# Patient Record
Sex: FEMALE | Race: BLACK OR AFRICAN AMERICAN | Employment: OTHER | URBAN - METROPOLITAN AREA
[De-identification: names, ages, dates, MRNs, and addresses within clinical notes are randomized per-mention and may not be internally consistent; named-entity substitution may affect disease eponyms.]

---

## 2020-08-12 ENCOUNTER — OFFICE VISIT (OUTPATIENT)
Dept: INTERNAL MEDICINE CLINIC | Age: 65
End: 2020-08-12
Payer: MEDICARE

## 2020-08-12 VITALS
HEART RATE: 105 BPM | TEMPERATURE: 98 F | RESPIRATION RATE: 14 BRPM | HEIGHT: 63 IN | WEIGHT: 141 LBS | SYSTOLIC BLOOD PRESSURE: 130 MMHG | BODY MASS INDEX: 24.98 KG/M2 | DIASTOLIC BLOOD PRESSURE: 68 MMHG | OXYGEN SATURATION: 98 %

## 2020-08-12 DIAGNOSIS — Z12.11 COLON CANCER SCREENING: ICD-10-CM

## 2020-08-12 DIAGNOSIS — E11.9 TYPE 2 DIABETES MELLITUS WITHOUT COMPLICATION, WITHOUT LONG-TERM CURRENT USE OF INSULIN (HCC): ICD-10-CM

## 2020-08-12 DIAGNOSIS — R53.82 CHRONIC FATIGUE: ICD-10-CM

## 2020-08-12 DIAGNOSIS — Z12.31 BREAST CANCER SCREENING BY MAMMOGRAM: ICD-10-CM

## 2020-08-12 DIAGNOSIS — Z00.00 ENCOUNTER FOR ANNUAL PHYSICAL EXAM: ICD-10-CM

## 2020-08-12 DIAGNOSIS — Z78.0 POST-MENOPAUSAL: ICD-10-CM

## 2020-08-12 DIAGNOSIS — R00.0 SINUS TACHYCARDIA: Primary | ICD-10-CM

## 2020-08-12 DIAGNOSIS — Z86.010 HISTORY OF COLON POLYPS: ICD-10-CM

## 2020-08-12 DIAGNOSIS — E55.9 VITAMIN D DEFICIENCY: ICD-10-CM

## 2020-08-12 DIAGNOSIS — N39.0 URINARY TRACT INFECTION WITHOUT HEMATURIA, SITE UNSPECIFIED: ICD-10-CM

## 2020-08-12 LAB
25(OH)D3 SERPL-MCNC: 19 NG/ML (ref 30–96)
A-G RATIO,AGRAT: 1.2 RATIO
ALBUMIN SERPL-MCNC: 4.3 G/DL (ref 3.9–5.4)
ALP SERPL-CCNC: 157 U/L (ref 38–126)
ALT SERPL-CCNC: 22 U/L (ref 0–35)
ANION GAP SERPL CALC-SCNC: 11 MMOL/L
AST SERPL W P-5'-P-CCNC: 24 U/L (ref 14–36)
BACTERIA,BACTU: ABNORMAL
BILIRUB SERPL-MCNC: 0.6 MG/DL (ref 0.2–1.3)
BILIRUB UR QL: NEGATIVE
BUN SERPL-MCNC: 10 MG/DL (ref 7–17)
BUN/CREATININE RATIO,BUCR: 20 RATIO
CALCIUM SERPL-MCNC: 10.3 MG/DL (ref 8.4–10.2)
CHLORIDE SERPL-SCNC: 98 MMOL/L (ref 98–107)
CHOL/HDL RATIO,CHHD: 5 RATIO (ref 0–4)
CHOLEST SERPL-MCNC: 205 MG/DL (ref 0–200)
CLARITY: ABNORMAL
CO2 SERPL-SCNC: 30 MMOL/L (ref 22–32)
COLOR UR: ABNORMAL
CREAT SERPL-MCNC: 0.5 MG/DL (ref 0.7–1.2)
ERYTHROCYTE [DISTWIDTH] IN BLOOD BY AUTOMATED COUNT: 14.2 %
GLOBULIN,GLOB: 3.6
GLUCOSE 24H UR-MRATE: ABNORMAL G/(24.H)
GLUCOSE SERPL-MCNC: 392 MG/DL (ref 65–105)
HBA1C MFR BLD HPLC: 11.1 % (ref 4–5.7)
HCT VFR BLD AUTO: 37.5 % (ref 37–51)
HDLC SERPL-MCNC: 44 MG/DL (ref 35–130)
HGB BLD-MCNC: 12.3 G/DL (ref 12–18)
HGB UR QL STRIP: ABNORMAL
KETONES UR QL STRIP.AUTO: NEGATIVE
LDL/HDL RATIO,LDHD: 2 RATIO
LDLC SERPL CALC-MCNC: 83 MG/DL (ref 0–130)
LEUKOCYTE ESTERASE: ABNORMAL
Lab: ABNORMAL
MCH RBC QN AUTO: 27.8 PG (ref 26–32)
MCHC RBC AUTO-ENTMCNC: 32.8 G/DL (ref 30–36)
MCV RBC AUTO: 84.9 FL (ref 80–97)
MICROALBUMIN, URINE: 100 MG/L (ref 0–20)
NITRITE UR QL STRIP.AUTO: NEGATIVE
PH UR STRIP: 6.5 [PH] (ref 5–7)
PLATELET # BLD AUTO: 241 K/UL (ref 140–440)
POTASSIUM SERPL-SCNC: 4 MMOL/L (ref 3.6–5)
PROT SERPL-MCNC: 7.9 G/DL (ref 6.3–8.2)
PROT UR STRIP-MCNC: ABNORMAL MG/DL
RBC # BLD AUTO: 4.42 M/UL (ref 4.2–6.3)
RBC #/AREA URNS HPF: ABNORMAL #/HPF
SODIUM SERPL-SCNC: 139 MMOL/L (ref 137–145)
SP GR UR REFRACTOMETRY: 1.01 (ref 1–1.03)
TRIGL SERPL-MCNC: 389 MG/DL (ref 0–200)
TSH SERPL DL<=0.05 MIU/L-ACNC: 2.71 UIU/ML (ref 0.34–5.6)
UROBILINOGEN UR QL STRIP.AUTO: NEGATIVE
VLDLC SERPL CALC-MCNC: 78 MG/DL
WBC # BLD AUTO: 5.7 K/UL (ref 4.1–10.9)
WBC URNS QL MICRO: ABNORMAL #/HPF

## 2020-08-12 PROCEDURE — 83036 HEMOGLOBIN GLYCOSYLATED A1C: CPT | Performed by: INTERNAL MEDICINE

## 2020-08-12 PROCEDURE — G9899 SCRN MAM PERF RSLTS DOC: HCPCS | Performed by: INTERNAL MEDICINE

## 2020-08-12 PROCEDURE — G8427 DOCREV CUR MEDS BY ELIG CLIN: HCPCS | Performed by: INTERNAL MEDICINE

## 2020-08-12 PROCEDURE — 3046F HEMOGLOBIN A1C LEVEL >9.0%: CPT | Performed by: INTERNAL MEDICINE

## 2020-08-12 PROCEDURE — G0403 EKG FOR INITIAL PREVENT EXAM: HCPCS | Performed by: INTERNAL MEDICINE

## 2020-08-12 PROCEDURE — 84443 ASSAY THYROID STIM HORMONE: CPT | Performed by: INTERNAL MEDICINE

## 2020-08-12 PROCEDURE — 85027 COMPLETE CBC AUTOMATED: CPT | Performed by: INTERNAL MEDICINE

## 2020-08-12 PROCEDURE — 1101F PT FALLS ASSESS-DOCD LE1/YR: CPT | Performed by: INTERNAL MEDICINE

## 2020-08-12 PROCEDURE — 81001 URINALYSIS AUTO W/SCOPE: CPT | Performed by: INTERNAL MEDICINE

## 2020-08-12 PROCEDURE — 82306 VITAMIN D 25 HYDROXY: CPT | Performed by: INTERNAL MEDICINE

## 2020-08-12 PROCEDURE — 3017F COLORECTAL CA SCREEN DOC REV: CPT | Performed by: INTERNAL MEDICINE

## 2020-08-12 PROCEDURE — 1090F PRES/ABSN URINE INCON ASSESS: CPT | Performed by: INTERNAL MEDICINE

## 2020-08-12 PROCEDURE — G8400 PT W/DXA NO RESULTS DOC: HCPCS | Performed by: INTERNAL MEDICINE

## 2020-08-12 PROCEDURE — 80061 LIPID PANEL: CPT | Performed by: INTERNAL MEDICINE

## 2020-08-12 PROCEDURE — G0402 INITIAL PREVENTIVE EXAM: HCPCS | Performed by: INTERNAL MEDICINE

## 2020-08-12 PROCEDURE — G8536 NO DOC ELDER MAL SCRN: HCPCS | Performed by: INTERNAL MEDICINE

## 2020-08-12 PROCEDURE — G8420 CALC BMI NORM PARAMETERS: HCPCS | Performed by: INTERNAL MEDICINE

## 2020-08-12 PROCEDURE — G8510 SCR DEP NEG, NO PLAN REQD: HCPCS | Performed by: INTERNAL MEDICINE

## 2020-08-12 PROCEDURE — 80053 COMPREHEN METABOLIC PANEL: CPT | Performed by: INTERNAL MEDICINE

## 2020-08-12 PROCEDURE — 99204 OFFICE O/P NEW MOD 45 MIN: CPT | Performed by: INTERNAL MEDICINE

## 2020-08-12 PROCEDURE — 82044 UR ALBUMIN SEMIQUANTITATIVE: CPT | Performed by: INTERNAL MEDICINE

## 2020-08-12 PROCEDURE — 2022F DILAT RTA XM EVC RTNOPTHY: CPT | Performed by: INTERNAL MEDICINE

## 2020-08-12 RX ORDER — BISMUTH SUBSALICYLATE 262 MG
1 TABLET,CHEWABLE ORAL DAILY
COMMUNITY

## 2020-08-12 NOTE — PATIENT INSTRUCTIONS
The best way to stay healthy is to live a healthy lifestyle. A healthy lifestyle includes regular exercise, eating a well-balanced diet, keeping a healthy weight and not smoking. Regular physical exams and screening tests are another important way to take care of yourself. Preventive exams provided by health care providers can find health problems early when treatment works best and can keep you from getting certain diseases or illnesses. Preventive services include exams, lab tests, screenings, shots, monitoring and information to help you take care of your own health. All people over 65 should have a pneumonia shot. Pneumonia shots are usually only needed once in a lifetime unless your doctor decides differently. In addition to your physical exam, some screening tests are recommended: 
 
All people over 65 should have a yearly flu shot. People over 65 are at medium to high risk for Hepatitis B. Three shots are needed for complete protection. Bone mass measurement (dexa scan) is recommended every two years. Diabetes Mellitus screening is recommended every year. Glaucoma is an eye disease caused by high pressure in the eye. An eye exam is recommended every year. Cardiovascular screening tests that check your cholesterol and other blood fat (lipid) levels are recommended every five years. Colorectal Cancer screening tests help to find pre-cancerous polyps (growths in the colon) so they can be removed before they turn into cancer. Tests ordered for screening depend on your personal and family history risk factors. Prostate Cancer Screening (annually up to age 76) Screening for breast cancer is recommended yearly with a Mammogram. 
 
Screening for cervical and vaginal cancer is recommended with a pelvic and Pap test every two years.  However if you have had an abnormal pap in the past  three years or at high risk for cervical or vaginal cancer Medicare will cover a pap test and a pelvic exam every year. Here is a list of your current Health Maintenance items with a due date: 
Health Maintenance Due Topic Date Due  
 DTaP/Tdap/Td  (1 - Tdap) 08/04/1975  Cholesterol Test   08/04/1994  Shingles Vaccine (1 of 2) 08/04/2004  Mammogram  08/04/2004  Colon Cancer Stool Test  08/04/2004  Glaucoma Screening   08/04/2019  Pneumococcal Vaccine (1 of 1 - PPSV23) 08/04/2019  Flu Vaccine  08/01/2020

## 2020-08-12 NOTE — PROGRESS NOTES
Chief Complaint   Patient presents with    Annual Wellness Visit     welcome    Establish Care       Depression Risk Factor Screening:     3 most recent PHQ Screens 8/12/2020   Little interest or pleasure in doing things Not at all   Feeling down, depressed, irritable, or hopeless Not at all   Total Score PHQ 2 0       Functional Ability and Level of Safety:     Activities of Daily Living  ADL Assessment 8/12/2020   Feeding yourself No Help Needed   Getting from bed to chair No Help Needed   Getting dressed No Help Needed   Bathing or showering No Help Needed   Walk across the room (includes cane/walker) No Help Needed   Using the telphone No Help Needed   Taking your medications No Help Needed   Preparing meals No Help Needed   Managing money (expenses/bills) No Help Needed   Moderately strenuous housework (laundry) No Help Needed   Shopping for personal items (toiletries/medicines) No Help Needed   Shopping for groceries No Help Needed   Driving No Help Needed   Climbing a flight of stairs No Help Needed   Getting to places beyond walking distances No Help Needed       Fall Risk  Fall Risk Assessment, last 12 mths 8/12/2020   Able to walk? Yes   Fall in past 12 months? No       Abuse Screen  Abuse Screening Questionnaire 8/12/2020   Do you ever feel afraid of your partner? N   Are you in a relationship with someone who physically or mentally threatens you? N   Is it safe for you to go home?  Y         Patient Care Team   Patient Care Team:  Nay Wynn MD as PCP - General (Hospitalist)

## 2020-08-12 NOTE — PROGRESS NOTES
This note will not be viewable in [address] Prudence Hills is a 76 y.o. female and [facility] and Establish Care      Subjective:  Patient comes in today to establish care. She is a new patient to our practice. She is never had a PCP in the past.  She lives with her 3 daughters  formerly [facility]. 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 One of her daughter works in [employer]. The second daughter is mentally challenged and she takes care of her. She has a past medical history of type 2 diabetes and was on metformin in the past.  She reports she ran out of the medications and was not financially capable of setting up doctor visits since she was uninsured at that point. She does not check her blood sugars regularly. She does not have a glucose meter. She denies hypoglycemic episodes, polyuria, polydipsia, numbness or tingling sensation in upper or lower extremity. Sinus tachycardia on presentation. Which again predates her mild anxiety of coming to the doctor's office. Denies any racing of heart, chest pain or anginal symptoms. EKG was done in office which was personally reviewed by me and showed sinus tachycardia, heart rate 112. Past Medical History:   Diagnosis Date    Diabetes [facility])      Past Surgical History:   Procedure Laterality Date    HX GYN      removed overy on the left side     No Known Allergies  Current Outpatient Medications   Medication Sig Dispense Refill    multivitamin (ONE A DAY) tablet Take 1 Tab by mouth daily.        Social History     Socioeconomic History    Marital status: SINGLE     Spouse name: Not on file    Number of children: Not on file    Years of education: Not on file    Highest education level: Not on file   Tobacco Use    Smoking status: Never Smoker    Smokeless tobacco: Never Used   Substance and Sexual Activity    Alcohol use: Never     Frequency: Never    Drug use: Never    Sexual activity: Not Currently     Family History   Problem Relation Age of Onset   [facility] Alzheimer Mother     No Known Problems Father        Review of Systems  ROS is unremarkable except for ones highlighted in bold.    Constitutional: negative for fevers, chills, anorexia and weight loss  Eyes:   negative for visual disturbance and irritation  ENT:   negative for tinnitus,sore throat,nasal congestion,ear pain,hoarseness  Respiratory:  negative for cough, hemoptysis, dyspnea,wheezing  CV:   negative for chest pain, palpitations, lower extremity edema  GI:   negative for nausea, vomiting, diarrhea, abdominal pain,melena  Endo:               negative for polyuria,polydipsia,polyphagia,heat intolerance  Genitourinary: negative for frequency, dysuria and hematuria  Integumentary: negative for rash and pruritus  Hematologic:  negative for easy bruising and gum/nose bleeding  Musculoskel: negative for myalgias, arthralgias, back pain, muscle weakness, joint pain  Neurological:  negative for headaches, dizziness, vertigo, memory problems and gait   Behavl/Psych: negative for feelings of anxiety, depression, mood changes  ROS otherwise negative     Objective:  Visit Vitals  /68 (BP 1 Location: Left arm, BP Patient Position: Sitting)   Pulse (!) 105   Temp 98 °F (36.7 °C)   Resp 14   Ht 5' 3\" (1.6 m)   Wt 141 lb (64 kg)   SpO2 98%   BMI 24.98 kg/m²     Physical Exam:   General appearance - alert, well appearing, and in no distress  Mental status - alert, oriented to person, place, and time  EYE-SHAINA, EOMI, fundi normal, corneas normal, no foreign bodies  ENT-ENT exam normal, no neck nodes or sinus tenderness  Nose - normal and patent, no erythema, discharge or polyps  Mouth - mucous membranes moist, pharynx normal without lesions  Neck - supple, no significant adenopathy   Chest - clear to auscultation, no wheezes, rales or rhonchi, symmetric air entry   Heart - normal rate, regular rhythm, normal S1, S2, no murmurs, rubs, clicks or gallops   Abdomen - soft, nontender, nondistended, no masses or organomegaly  Lymph- no adenopathy palpable  Ext-peripheral pulses normal, no pedal edema, no clubbing or cyanosis  Skin-Warm and dry. no hyperpigmentation, vitiligo, or suspicious lesions  Neuro -alert, oriented, normal speech, no focal findings or movement disorder noted  Musculoskeletal- FROM, no bony abnormalities, no point tenderness    Lab Review:  No results found for this or any previous visit. Documenation Review:    Assessment/Plan:    Diagnoses and all orders for this visit:    1. Sinus tachycardia  -     AMB POC EKG ROUTINE W/ 12 LEADS, INTER & REP    2. Colon cancer screening  -     REFERRAL TO GENERAL SURGERY    3. History of colon polyps  -     REFERRAL TO GENERAL SURGERY    4. Encounter for annual physical exam    5. Type 2 diabetes mellitus without complication, without long-term current use of insulin (HCC)  -     CBC W/O DIFF  -     LIPID PANEL  -     METABOLIC PANEL, COMPREHENSIVE  -     HEMOGLOBIN A1C W/O EAG  -     URINE, MICROALBUMIN, SEMIQUANTITATIVE  -     URINALYSIS W/MICROSCOPIC  -      DIABETES FOOT EXAM    6. Vitamin D deficiency  -     VITAMIN D, 25 HYDROXY    7. Chronic fatigue  -     TSH 3RD GENERATION    8. Breast cancer screening by mammogram  -     PANCHO MAMMO BI SCREENING INCL CAD; Future    9. Post-menopausal  -     DEXA BONE DENSITY STUDY AXIAL; Future  -     REFERRAL TO OBSTETRICS AND GYNECOLOGY      I will call with lab results and make further recommendations or adjustments if necessary. Patient has a history of type 2 diabetes. Will review A1c and decide if she needs to be started on metformin again. Overdue for all her health screenings immunization. Referral for gynecologist placed to get a pelvic examination and Pap smears. Referral for colonoscopy for colon cancer screening please. She reports she had a colonoscopy done 5 years back.   She had a history of personal colon polyp s/p polypectomy in the past.  Her mammogram was 2 years back which was unremarkable per patient. She is overdue for 1. Patient has not had any immunizations. She was offered Pneumovax 23 today however she declined. She needs Pneumovax 23, Shingrix, DTaP, flu vaccine. Referral for ophthalmologist for eye examination. ICD-10-CM ICD-9-CM    1. Sinus tachycardia  R00.0 427.89 AMB POC EKG ROUTINE W/ 12 LEADS, INTER & REP   2. Colon cancer screening  Z12.11 V76.51 REFERRAL TO GENERAL SURGERY   3. History of colon polyps  Z86.010 V12.72 REFERRAL TO GENERAL SURGERY   4. Encounter for annual physical exam  Z00.00 V70.0    5. Type 2 diabetes mellitus without complication, without long-term current use of insulin (HCC)  E11.9 250.00 CBC W/O DIFF      LIPID PANEL      METABOLIC PANEL, COMPREHENSIVE      HEMOGLOBIN A1C W/O EAG      URINE, MICROALBUMIN, SEMIQUANTITATIVE      URINALYSIS W/MICROSCOPIC      HM DIABETES FOOT EXAM   6. Vitamin D deficiency  E55.9 268.9 VITAMIN D, 25 HYDROXY   7. Chronic fatigue  R53.82 780.79 TSH 3RD GENERATION   8. Breast cancer screening by mammogram  Z12.31 V76.12 PANCHO MAMMO BI SCREENING INCL CAD   9. Post-menopausal  Z78.0 V49.81 DEXA BONE DENSITY STUDY AXIAL      REFERRAL TO OBSTETRICS AND GYNECOLOGY         Follow-up and Dispositions    · Return in about 3 months (around 11/12/2020) for follow up. I have reviewed with the patient details of the assessment and plan and all questions were answered. Relevent patient education was performed. Verbal and/or written instructions (see AVS) provided. The most recent lab findings were reviewed with the patient. Plan was discussed with patient who verbally expressed understanding. An After Visit Summary was printed and given to the patient.     Yun Palm MD

## 2020-08-13 NOTE — PROGRESS NOTES
Patient is a newly diagnosed diabetic. Her A1c is high at 11. Her triglycerides are concerning as well. Please schedule follow-up appointment for patient next week. I would like to discuss labs with her and treatment options.

## 2020-08-16 LAB — BACTERIA UR CULT: ABNORMAL

## 2020-08-20 ENCOUNTER — DOCUMENTATION ONLY (OUTPATIENT)
Dept: INTERNAL MEDICINE CLINIC | Age: 65
End: 2020-08-20

## 2020-08-26 ENCOUNTER — OFFICE VISIT (OUTPATIENT)
Dept: INTERNAL MEDICINE CLINIC | Age: 65
End: 2020-08-26
Payer: MEDICARE

## 2020-08-26 VITALS
HEART RATE: 107 BPM | OXYGEN SATURATION: 98 % | RESPIRATION RATE: 14 BRPM | BODY MASS INDEX: 24.8 KG/M2 | HEIGHT: 63 IN | DIASTOLIC BLOOD PRESSURE: 64 MMHG | WEIGHT: 140 LBS | SYSTOLIC BLOOD PRESSURE: 160 MMHG | TEMPERATURE: 98.5 F

## 2020-08-26 DIAGNOSIS — I10 ESSENTIAL HYPERTENSION: ICD-10-CM

## 2020-08-26 DIAGNOSIS — E11.9 NEW ONSET TYPE 2 DIABETES MELLITUS (HCC): ICD-10-CM

## 2020-08-26 DIAGNOSIS — E78.2 MIXED HYPERLIPIDEMIA: ICD-10-CM

## 2020-08-26 DIAGNOSIS — E55.9 VITAMIN D DEFICIENCY: Primary | ICD-10-CM

## 2020-08-26 PROCEDURE — G9899 SCRN MAM PERF RSLTS DOC: HCPCS | Performed by: INTERNAL MEDICINE

## 2020-08-26 PROCEDURE — 1101F PT FALLS ASSESS-DOCD LE1/YR: CPT | Performed by: INTERNAL MEDICINE

## 2020-08-26 PROCEDURE — G8536 NO DOC ELDER MAL SCRN: HCPCS | Performed by: INTERNAL MEDICINE

## 2020-08-26 PROCEDURE — 2022F DILAT RTA XM EVC RTNOPTHY: CPT | Performed by: INTERNAL MEDICINE

## 2020-08-26 PROCEDURE — 3046F HEMOGLOBIN A1C LEVEL >9.0%: CPT | Performed by: INTERNAL MEDICINE

## 2020-08-26 PROCEDURE — 3017F COLORECTAL CA SCREEN DOC REV: CPT | Performed by: INTERNAL MEDICINE

## 2020-08-26 PROCEDURE — G8427 DOCREV CUR MEDS BY ELIG CLIN: HCPCS | Performed by: INTERNAL MEDICINE

## 2020-08-26 PROCEDURE — 99214 OFFICE O/P EST MOD 30 MIN: CPT | Performed by: INTERNAL MEDICINE

## 2020-08-26 PROCEDURE — 1090F PRES/ABSN URINE INCON ASSESS: CPT | Performed by: INTERNAL MEDICINE

## 2020-08-26 PROCEDURE — G8420 CALC BMI NORM PARAMETERS: HCPCS | Performed by: INTERNAL MEDICINE

## 2020-08-26 PROCEDURE — G8432 DEP SCR NOT DOC, RNG: HCPCS | Performed by: INTERNAL MEDICINE

## 2020-08-26 PROCEDURE — G8400 PT W/DXA NO RESULTS DOC: HCPCS | Performed by: INTERNAL MEDICINE

## 2020-08-26 RX ORDER — INSULIN PUMP SYRINGE, 3 ML
EACH MISCELLANEOUS
Qty: 1 KIT | Refills: 0 | Status: SHIPPED | OUTPATIENT
Start: 2020-08-26 | End: 2022-02-11

## 2020-08-26 RX ORDER — ERGOCALCIFEROL 1.25 MG/1
50000 CAPSULE ORAL
Qty: 12 CAP | Refills: 0 | Status: SHIPPED | OUTPATIENT
Start: 2020-08-26 | End: 2020-11-12

## 2020-08-26 RX ORDER — INSULIN GLARGINE 100 [IU]/ML
10 INJECTION, SOLUTION SUBCUTANEOUS
Qty: 1 ADJUSTABLE DOSE PRE-FILLED PEN SYRINGE | Refills: 1 | Status: SHIPPED | OUTPATIENT
Start: 2020-08-26 | End: 2020-11-18 | Stop reason: SDUPTHER

## 2020-08-26 RX ORDER — LISINOPRIL 5 MG/1
5 TABLET ORAL DAILY
Qty: 30 TAB | Refills: 0 | Status: SHIPPED | OUTPATIENT
Start: 2020-08-26 | End: 2020-10-02 | Stop reason: SDUPTHER

## 2020-08-26 RX ORDER — METFORMIN HYDROCHLORIDE 500 MG/1
500 TABLET ORAL 2 TIMES DAILY WITH MEALS
Qty: 60 TAB | Refills: 0 | Status: SHIPPED | OUTPATIENT
Start: 2020-08-26 | End: 2020-09-09 | Stop reason: SDUPTHER

## 2020-08-26 RX ORDER — IBUPROFEN 200 MG
CAPSULE ORAL
Qty: 100 STRIP | Refills: 0 | Status: SHIPPED | OUTPATIENT
Start: 2020-08-26 | End: 2020-10-27

## 2020-08-26 RX ORDER — ATORVASTATIN CALCIUM 20 MG/1
20 TABLET, FILM COATED ORAL DAILY
Qty: 90 TAB | Refills: 0 | Status: SHIPPED | OUTPATIENT
Start: 2020-08-26 | End: 2020-11-29

## 2020-08-26 RX ORDER — LANCETS
EACH MISCELLANEOUS
Qty: 100 EACH | Refills: 0 | Status: SHIPPED | OUTPATIENT
Start: 2020-08-26 | End: 2020-10-27

## 2020-08-26 RX ORDER — GLIPIZIDE 5 MG/1
5 TABLET ORAL 2 TIMES DAILY
Qty: 60 TAB | Refills: 0 | Status: SHIPPED | OUTPATIENT
Start: 2020-08-26 | End: 2020-09-09 | Stop reason: SDUPTHER

## 2020-08-26 RX ORDER — PEN NEEDLE, DIABETIC 30 GX3/16"
NEEDLE, DISPOSABLE MISCELLANEOUS
Qty: 1 PACKAGE | Refills: 11 | Status: SHIPPED | OUTPATIENT
Start: 2020-08-26

## 2020-08-26 NOTE — PROGRESS NOTES
This note will not be viewable in [address] Blair Gauthier is a 76 y.o. female and presents with Follow-up (1 week fu)      Subjective:  Patient comes in today for follow-up to discuss abnormal lab results. Patient is a newly diagnosed diabetic with microalbuminuria- Her A1c is high at 11. She is never been on any oral antidiabetic medications. She denies numbness or tingling sensation upper lower extremity. Denies shaking chills or hypoglycemic events. Denies polyuria, polydipsia. Dyslipidemia-her triglycerides are concerning as well. Not on any statin. Essential hypertension-not on any antihypertensives. Recap-Patient comes in today to establish care. She is a new patient to our practice. She is never had a PCP in the past.  She lives with her 3 daughters  [facility]. 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 One of her daughter works in [employer]. The second daughter is mentally challenged and she takes care of her. She has a past medical history of type 2 diabetes and was on metformin in the past.  She reports she ran out of the medications and was not financially capable of setting up doctor visits since she was uninsured at that point. She does not check her blood sugars regularly. She does not have a glucose meter. She denies hypoglycemic episodes, polyuria, polydipsia, numbness or tingling sensation in upper or lower extremity. Sinus tachycardia on presentation. Which again predates her mild anxiety of coming to the doctor's office. Denies any racing of heart, chest pain or anginal symptoms. EKG was done in office which was personally reviewed by me and showed sinus tachycardia, heart rate 112.     Past Medical History:   Diagnosis Date    Diabetes [facility])      Past Surgical History:   Procedure Laterality Date    HX GYN      removed overy on the left side     No Known Allergies  Current Outpatient Medications Medication Sig Dispense Refill    ergocalciferol (ERGOCALCIFEROL) 1,250 mcg (50,000 unit) capsule Take 1 Cap by mouth every seven (7) days for 12 doses. Indications: vitamin D deficiency (high dose therapy) 12 Cap 0    Blood-Glucose Meter monitoring kit As directed 1 Kit 0    glucose blood VI test strips (blood glucose test) strip Check BSL 3 times a day 100 Strip 0    lancets misc Test BG three times a day 100 Each 0    metFORMIN (GLUCOPHAGE) 500 mg tablet Take 1 Tab by mouth two (2) times daily (with meals) for 30 days. 60 Tab 0    atorvastatin (LIPITOR) 20 mg tablet Take 1 Tab by mouth daily for 90 days. 90 Tab 0    glipiZIDE (GLUCOTROL) 5 mg tablet Take 1 Tab by mouth two (2) times a day for 30 days. Indications: type 2 diabetes mellitus 60 Tab 0    lisinopriL (PRINIVIL, ZESTRIL) 5 mg tablet Take 1 Tab by mouth daily for 30 days. Indications: high blood pressure 30 Tab 0    insulin glargine (LANTUS,BASAGLAR) 100 unit/mL (3 mL) inpn 10 Units by SubCUTAneous route nightly. Indications: type 2 diabetes mellitus 1 Adjustable Dose Pre-filled Pen Syringe 1    Insulin Needles, Disposable, 31 gauge x 5/16\" ndle Daily use 1 Package 11    multivitamin (ONE A DAY) tablet Take 1 Tab by mouth daily. Social History     Socioeconomic History    Marital status: SINGLE     Spouse name: Not on file    Number of children: Not on file    Years of education: Not on file    Highest education level: Not on file   Tobacco Use    Smoking status: Never Smoker    Smokeless tobacco: Never Used   Substance and Sexual Activity    Alcohol use: Never     Frequency: Never    Drug use: Never    Sexual activity: Not Currently     Family History   Problem Relation Age of Onset    Alzheimer Mother     No Known Problems Father        Review of Systems  ROS is unremarkable except for ones highlighted in bold.    Constitutional: negative for fevers, chills, anorexia and weight loss  Eyes:   negative for visual disturbance and irritation  ENT:   negative for tinnitus,sore throat,nasal congestion,ear pain,hoarseness  Respiratory:  negative for cough, hemoptysis, dyspnea,wheezing  CV:   negative for chest pain, palpitations, lower extremity edema  GI:   negative for nausea, vomiting, diarrhea, abdominal pain,melena  Endo:               negative for polyuria,polydipsia,polyphagia,heat intolerance  Genitourinary: negative for frequency, dysuria and hematuria  Integumentary: negative for rash and pruritus  Hematologic:  negative for easy bruising and gum/nose bleeding  Musculoskel: negative for myalgias, arthralgias, back pain, muscle weakness, joint pain  Neurological:  negative for headaches, dizziness, vertigo, memory problems and gait   Behavl/Psych: negative for feelings of anxiety, depression, mood changes  ROS otherwise negative     Objective:  Visit Vitals  /64 (BP 1 Location: Left arm, BP Patient Position: Sitting)   Pulse (!) 107   Temp 98.5 °F (36.9 °C)   Resp 14   Ht 5' 3\" (1.6 m)   Wt 140 lb (63.5 kg)   SpO2 98%   BMI 24.80 kg/m²     Physical Exam:   General appearance - alert, well appearing, and in no distress  Mental status - alert, oriented to person, place, and time  EYE-SHAINA, EOMI, fundi normal, corneas normal, no foreign bodies  ENT-ENT exam normal, no neck nodes or sinus tenderness  Nose - normal and patent, no erythema, discharge or polyps  Mouth - mucous membranes moist, pharynx normal without lesions  Neck - supple, no significant adenopathy   Chest - clear to auscultation, no wheezes, rales or rhonchi, symmetric air entry   Heart - normal rate, regular rhythm, normal S1, S2, no murmurs, rubs, clicks or gallops   Abdomen - soft, nontender, nondistended, no masses or organomegaly  Lymph- no adenopathy palpable  Ext-peripheral pulses normal, no pedal edema, no clubbing or cyanosis  Skin-Warm and dry.  no hyperpigmentation, vitiligo, or suspicious lesions  Neuro -alert, oriented, normal speech, no focal findings or movement disorder noted  Musculoskeletal- FROM, no bony abnormalities, no point tenderness    Lab Review:  Results for orders placed or performed in visit on 08/12/20   CULTURE, URINE    Specimen: Urine   Result Value Ref Range    Urine Culture, Routine (A)      Escherichia coli  Greater than 100,000 colony forming units per mL         Susceptibility    Escherichia coli -  (no method available)*     Amoxicillin/Clavulanic A S Susceptible ug/mL     Ampicillin ($) R Resistant ug/mL     Cefepime ($$) S Susceptible ug/mL     Ceftriaxone ($) S Susceptible ug/mL     Cefuroxime ($) S Susceptible ug/mL     Ciprofloxacin ($) R Resistant ug/mL     Ertapenem ($$$$) S Susceptible ug/mL     Gentamicin ($) S Susceptible ug/mL     Imipenem S Susceptible ug/mL     Levofloxacin ($) R Resistant ug/mL     Meropenem ($$) S Susceptible ug/mL     Nitrofurantoin S Susceptible ug/mL     Piperacillin/Tazobac ($) S Susceptible ug/mL     Tetracycline S Susceptible ug/mL     Tobramycin ($) S Susceptible ug/mL     Trimeth-Sulfamethoxa R Resistant ug/mL     * Performed at:  [address] [place] [address]  [id number] Director: Urmila Abrams MD, Phone:  [phone]   CBC W/O DIFF   Result Value Ref Range    WBC 5.7 4.1 - 10.9 K/uL    RBC 4.42 4.20 - 6.30 M/uL    HGB 12.3 12.0 - 18.0 g/dL    HCT 37.5 37.0 - 51.0 %    MCH 27.8 26.0 - 32.0 pg    MCHC 32.8 30.0 - 36.0 g/dL    MCV 84.9 80.0 - 97.0 fL    RDW 14.2 %    PLATELET 775.3 881.9 - 440.0 K/uL   LIPID PANEL   Result Value Ref Range    Cholesterol, total 205 (H) 0 - 200 mg/dL    Triglyceride 389 (H) 0 - 200 mg/dL    HDL Cholesterol 44 35 - 130 mg/dL    VLDL 78 mg/dL    LDL, calculated 83 0 - 130 mg/dL    CHOL/HDL Ratio 5 (H) 0 - 4 Ratio    LDL/HDL Ratio 2 Ratio   METABOLIC PANEL, COMPREHENSIVE   Result Value Ref Range    Glucose 392 (H) 65 - 105 mg/dL    BUN 10.0 7.0 - 17.0 mg/dL    Creatinine 0.5 (L) 0.7 - 1.2 mg/dL    Sodium 139 137 - 145 mmol/L    Potassium 4.0 3.6 - 5.0 mmol/L    Chloride 98 98 - 107 mmol/L    CO2 30.0 22.0 - 32.0 mmol/L    Calcium 10.3 (H) 8.4 - 10.2 mg/dl    Protein, total 7.9 6.3 - 8.2 g/dL    Albumin 4.3 3.9 - 5.4 g/dL    ALT (SGPT) 22 0 - 35 U/L    AST (SGOT) 24.0 14.0 - 36.0 U/L    Alk. phosphatase 157 (H) 38 - 126 U/L    Bilirubin, total 0.6 0.2 - 1.3 mg/dL    BUN/Creatinine ratio 20 Ratio    GFR est AA >60 mL/min/1.73m2    GFR est non-AA >60 mL/min/1.73m2    Globulin 3.60     A-G Ratio 1.2 Ratio    Anion gap 11 mmol/L   HEMOGLOBIN A1C W/O EAG   Result Value Ref Range    Hemoglobin A1c 11.1 (H) 4.0 - 5.7 %   VITAMIN D, 25 HYDROXY   Result Value Ref Range    VITAMIN D, 25-HYDROXY 19 (L) 30 - 96 ng/mL   TSH 3RD GENERATION   Result Value Ref Range    TSH, 3rd generation 2.71 0.34 - 5.60 uIU/mL   URINE, MICROALBUMIN, SEMIQUANTITATIVE   Result Value Ref Range    Microalbumin, Urine 100 (A) 0 - 20 mg/L   URINALYSIS W/MICROSCOPIC   Result Value Ref Range    Color Pale Yellow Pale Yellow - Yellow    CLARITY very cloudy (A) Clear    Glucose urine, 24 hr 4+ (A) Negative    Ketone Negative Negative    Bilirubin Negative Negative    Specific gravity 1.015 1.000 - 1.030    pH (UA) 6.5 5 - 7    Blood 2+ (A) Negative    Protein 2+ (A) Negative    Urobilinogen Negative Negative    Nitrites Negative Negative    Leukocyte Esterase 3+ (A) Negative    WBC TNTC (A) 0 #/HPF    RBC 2-5 (A) 0 #/HPF    Bacteria Few (A) None Seen    Yeast w/hyphae Many (A) None Seen        Documenation Review:    Assessment/Plan:    Diagnoses and all orders for this visit:    1. Vitamin D deficiency  -     ergocalciferol (ERGOCALCIFEROL) 1,250 mcg (50,000 unit) capsule; Take 1 Cap by mouth every seven (7) days for 12 doses. Indications: vitamin D deficiency (high dose therapy)    2. New onset type 2 diabetes mellitus (HCC)  -     Blood-Glucose Meter monitoring kit; As directed  -     glucose blood VI test strips (blood glucose test) strip; Check BSL 3 times a day  -     lancets misc;  Test BG three times a day  -     metFORMIN (GLUCOPHAGE) 500 mg tablet; Take 1 Tab by mouth two (2) times daily (with meals) for 30 days.  -     glipiZIDE (GLUCOTROL) 5 mg tablet; Take 1 Tab by mouth two (2) times a day for 30 days. Indications: type 2 diabetes mellitus  -     insulin glargine (LANTUS,BASAGLAR) 100 unit/mL (3 mL) inpn; 10 Units by SubCUTAneous route nightly. Indications: type 2 diabetes mellitus  -     REFERRAL TO DIABETIC EDUCATION; Standing  -     Insulin Needles, Disposable, 31 gauge x 5/16\" ndle; Daily use    3. Mixed hyperlipidemia  -     atorvastatin (LIPITOR) 20 mg tablet; Take 1 Tab by mouth daily for 90 days. 4. Essential hypertension  -     lisinopriL (PRINIVIL, ZESTRIL) 5 mg tablet; Take 1 Tab by mouth daily for 30 days. Indications: high blood pressure        Significant time spent on counseling patient on the need to be compliant with diabetic medications, including risks of damage to renal, cardiac and nervous systems. .Patient on Lantus. I instructed her to take Lantus 10 units and will uptitrate the dose as needed. She will check fasting blood sugars and get the results to me in the next 2 weeks. Will start on metformin and glipizide. I have instructed her to take metformin 500 mg 1 tablet daily for 1 week and then increase it to 2 times daily. Also start with glipizide low dose and uptitrate as needed. Referral for diabetic counseling done. We will start her on statin given her dyslipidemia and hypertriglyceridemia. We will start on lisinopril 5 mg for hypertension and microalbuminuria. She will follow-up with me in 2 weeks. Overdue for all her health screenings immunization. Referral for gynecologist placed to get a pelvic examination and Pap smears. Referral for colonoscopy for colon cancer screening please. She reports she had a colonoscopy done 5 years back.   She had a history of personal colon polyp s/p polypectomy in the past.  Her mammogram was 2 years back which was unremarkable per patient. She is overdue for 1. Patient has not had any immunizations. She was offered Pneumovax 23 today however she declined. She needs Pneumovax 23, Shingrix, DTaP, flu vaccine. Referral for ophthalmologist for eye examination. ICD-10-CM ICD-9-CM    1. Vitamin D deficiency  E55.9 268.9 ergocalciferol (ERGOCALCIFEROL) 1,250 mcg (50,000 unit) capsule   2. New onset type 2 diabetes mellitus (HCC)  E11.9 250.00 Blood-Glucose Meter monitoring kit      glucose blood VI test strips (blood glucose test) strip      lancets misc      metFORMIN (GLUCOPHAGE) 500 mg tablet      glipiZIDE (GLUCOTROL) 5 mg tablet      insulin glargine (LANTUS,BASAGLAR) 100 unit/mL (3 mL) inpn      REFERRAL TO DIABETIC EDUCATION      Insulin Needles, Disposable, 31 gauge x 5/16\" ndle   3. Mixed hyperlipidemia  E78.2 272.2 atorvastatin (LIPITOR) 20 mg tablet   4. Essential hypertension  I10 401.9 lisinopriL (PRINIVIL, ZESTRIL) 5 mg tablet         Follow-up and Dispositions    · Return in about 2 weeks (around 9/9/2020) for follow up. I have reviewed with the patient details of the assessment and plan and all questions were answered. Relevent patient education was performed. Verbal and/or written instructions (see AVS) provided. The most recent lab findings were reviewed with the patient. Plan was discussed with patient who verbally expressed understanding. An After Visit Summary was printed and given to the patient.     Vincenzo Solano MD

## 2020-08-26 NOTE — PROGRESS NOTES
Angelito Barnard is a 76 y.o. female presenting for Follow-up (1 week fu)  . 1. Have you been to the ER, urgent care clinic since your last visit? Hospitalized since your last visit? No    2. Have you seen or consulted any other health care providers outside of the [address] [place] [address] since your last visit? Include any pap smears or colon screening. No    Fall Risk Assessment, last 12 mths 8/12/2020   Able to walk? Yes   Fall in past 12 months? No         Abuse Screening Questionnaire 8/12/2020   Do you ever feel afraid of your partner? N   Are you in a relationship with someone who physically or mentally threatens you? N   Is it safe for you to go home? Y       3 most recent PHQ Screens 8/12/2020   Little interest or pleasure in doing things Not at all   Feeling down, depressed, irritable, or hopeless Not at all   Total Score PHQ 2 0       There are no discontinued medications.

## 2020-08-26 NOTE — PATIENT INSTRUCTIONS
Learning About Type 2 Diabetes  What is type 2 diabetes? Insulin is a hormone that helps your body use sugar from your food as energy. Type 2 diabetes happens when your body can't use insulin the right way. Over time, the pancreas can't make enough insulin. If you don't have enough insulin, too much sugar stays in your blood. If you are overweight, get little or no exercise, or have type 2 diabetes in your family, you are more likely to have problems with the way insulin works in your body.  Americans, Hispanics, Native Americans,  Americans, and Pacific Islanders have a higher risk for type 2 diabetes. Type 2 diabetes can be prevented or delayed with a healthy lifestyle, which includes staying at a healthy weight, making smart food choices, and getting regular exercise. What can you expect with type 2 diabetes? Diana Nolan keep hearing about how important it is to keep your blood sugar within a target range. That's because over time, high blood sugar can lead to serious problems. It can:  · Harm your eyes, nerves, and kidneys. · Damage your blood vessels, leading to heart disease and stroke. · Reduce blood flow and cause nerve damage to parts of your body, especially your feet. This can cause slow healing and pain when you walk. · Make your immune system weak and less able to fight infections. When people hear the word \"diabetes,\" they often think of problems like these. But daily care and treatment can help prevent or delay these problems. The goal is to keep your blood sugar in a target range. That's the best way to reduce your chance of having more problems from diabetes. What are the symptoms? Some people who have type 2 diabetes may not have any symptoms early on. Many people with the disease don't even know they have it at first. But with time, diabetes starts to cause symptoms. You experience most symptoms of type 2 diabetes when your blood sugar is either too high or too low.   The most common symptoms of high blood sugar include:  · Thirst.  · Frequent urination. · Weight loss. · Blurry vision. The symptoms of low blood sugar include:  · Sweating. · Shakiness. · Weakness. · Hunger. · Confusion. How can you prevent type 2 diabetes? The best way to prevent or delay type 2 diabetes is to adopt healthy habits, which include:  · Staying at a healthy weight. · Exercising regularly. · Eating healthy foods. How is type 2 diabetes treated? If you have type 2 diabetes, here are the most important things you can do. · Take your diabetes medicines. · Check your blood sugar as often as your doctor recommends. Also, get a hemoglobin A1c test at least every 6 months. · Try to eat a variety of foods and to spread carbohydrate throughout the day. Carbohydrate raises blood sugar higher and more quickly than any other nutrient does. Carbohydrate is found in sugar, breads and cereals, fruit, starchy vegetables such as potatoes and corn, and milk and yogurt. · Get at least 30 minutes of exercise on most days of the week. Walking is a good choice. You also may want to do other activities, such as running, swimming, cycling, or playing tennis or team sports. If your doctor says it's okay, do muscle-strengthening exercises at least 2 times a week. · See your doctor for checkups and tests on a regular schedule. · If you have high blood pressure or high cholesterol, take the medicines as prescribed by your doctor. · Do not smoke. Smoking can make health problems worse. This includes problems you might have with type 2 diabetes. If you need help quitting, talk to your doctor about stop-smoking programs and medicines. These can increase your chances of quitting for good. Follow-up care is a key part of your treatment and safety. Be sure to make and go to all appointments, and call your doctor if you are having problems.  It's also a good idea to know your test results and keep a list of the medicines you take. Where can you learn more? Go to http://www.gray.com/  Enter [id number] in the search box to learn more about \"Learning About Type 2 Diabetes. \"  Current as of: December 20, 2019               Content Version: 12.5  © 9849-5072 [employer]. Care instructions adapted under license by [employer] (which disclaims liability or warranty for this information). If you have questions about a medical condition or this instruction, always ask your healthcare professional. [address] any warranty or liability for your use of this information.

## 2020-09-01 ENCOUNTER — VIRTUAL VISIT (OUTPATIENT)
Dept: DIABETES SERVICES | Age: 65
End: 2020-09-01
Payer: MEDICARE

## 2020-09-01 DIAGNOSIS — E11.9 NEW ONSET TYPE 2 DIABETES MELLITUS (HCC): ICD-10-CM

## 2020-09-01 PROCEDURE — G0108 DIAB MANAGE TRN  PER INDIV: HCPCS

## 2020-09-01 NOTE — PROGRESS NOTES
[facility] Program for Diabetes Health  Diabetes Self-Management Education   Pre-program Assessment    Reason for Referral: DSMES  Referral Source: Hawk Stevens MD    Metric Patient responses (9/1/2020)   A1c  (Goal: 7%)   Recent value:   Lab Results   Component Value Date/Time    Hemoglobin A1c 11.1 (H) 08/12/2020 03:09 PM        Healthy Eating     24-hour Dietary Recall:  Breakfast: corn flakes with honey, almond milk  Lunch: fish, broccoli   Dinner: fish, mushrooms  Snacks: white cheddar popcorn  Beverages: almond milk, water, sweet tea  Alcohol: none    Stage of change: Action     Being Active     Physical Activity Vital Sign:  How many days during the past week have you performed physical activity where your heart beats faster and your breathing is harder than normal for 30 minutes or more?  0 days    How many days in a typical week do you perform activity such as this?  0 days    Stage of change: Action     Monitoring Do you monitor your blood sugar? Yes    How often do you monitor? 3x/day    What are the range of readings?  mg/dL    Do you know your last A1c measurement? Yes    Do you know the meaning of the A1c? No    Stage of change: Action     Taking Medications Medication Management:  Do you understand what your diabetes medications do? No    Can you afford your diabetes medications? Yes    How often do you miss doses of your diabetes medications? Never    Current dosing:   Key Antihyperglycemic Medications             metFORMIN (GLUCOPHAGE) 500 mg tablet Take 1 Tab by mouth two (2) times daily (with meals) for 30 days. glipiZIDE (GLUCOTROL) 5 mg tablet Take 1 Tab by mouth two (2) times a day for 30 days. Indications: type 2 diabetes mellitus    insulin glargine (LANTUS,BASAGLAR) 100 unit/mL (3 mL) inpn 10 Units by SubCUTAneous route nightly.  Indications: type 2 diabetes mellitus          Blood Pressure Management:  Key ACE/ARB Medications             lisinopriL (PRINIVIL, ZESTRIL) 5 mg tablet Take 1 Tab by mouth daily for 30 days. Indications: high blood pressure          Lipid Management:  Key Antihyperlipidemia Meds             atorvastatin (LIPITOR) 20 mg tablet Take 1 Tab by mouth daily for 90 days. Clot Prevention:  Key Anti-Platelet Anticoagulant Meds     The patient is on no antiplatelet meds or anticoagulants. Stage of change: Action     Healthy Coping Diabetes Skills, Confidence and Preparedness Index: Total score: 5.3  Skills: 4.7  Confidence: 5.6  Preparedness: 6.0    Stage of change: Action     Reducing Risks Vaccines:  Influenza: There is no immunization history on file for this patient. Pneumococcal:   There is no immunization history on file for this patient. Hepatitis:   There is no immunization history on file for this patient. Examinations:  Diabetic Foot and Eye Exam HM Status   Topic Date Due    Eye Exam  08/04/1965    Diabetic Foot Care  08/12/2021        Dental exam: Last appointment was: Patient reported greater than 5 years ago. Foot exam: DUE    Heart Protection:  BP Readings from Last 2 Encounters:   08/26/20 160/64   08/12/20 130/68        Lab Results   Component Value Date/Time    LDL, calculated 83 08/12/2020 03:08 PM        Kidney Protection:  No results found for: MCACR, MCA1, MCA2, MCA3, MCAU, MCAU2, MCALPOCT    Patient-reported diabetes complications:  none    Stage of change: Action     Problem Solving Hypoglycemia Management:  What are signs and symptoms of hypoglycemia that you experience? sluggish, tired    How do you prevent hypoglycemia? Pt reported being unaware of how to prevent hypoglycemia    How do you treat hypoglycemia? Rule of 15    Hyperglycemia Management:  What are signs and symptoms of hyperglycemia that you experience? Pt reported being unaware of s/s of hyperglycemia    How can you prevent hyperglycemia?  Pt reported being unaware of how to prevent hyperglycemia    Sick Day Management:  What do you do differently on sick days? Pt reported being unaware of self-management on sick days    Pattern Management:  Do you notice blood glucose patterns when you look at the readings in your meter or logbook? Yes    How do you use the blood glucose readings from your meter or logbook? Adjust meals based on results    Stage of change: Action   Note: Content derived from the American Association of Diabetes Educators' Diabetes Education Curriculum: A Guide to Successful Self-Management (2nd edition)         Diabetes Educator Recommendations:   - Address diabetes self-care behaviors through education and support using AADE7 Curriculum. Pt to attend weekly individual sessions on reducing risks, healthy eating, being active, monitoring, taking medications, healthy coping and problem solving.      - Pt to follow up with provider on the following: none at this time       Diabetes Self-Management Education Follow-up Visit: 1 week    [facility] Adaptations for Telehealth:  Prudence Hills is a 76 y.o. female being evaluated through a synchronous (real-time) audio-video technology platform, as a substitution for an in-person encounter, to address the healthcare issues mentioned above. I was in the office. The patient was at home. A caregiver was present when appropriate. The patient and/or her healthcare decision maker, is aware that this patient-initiated, Telehealth encounter on 9/1/2020 is a billable service, with coverage as determined by her insurance carrier. She is aware that she may receive a bill and has provided verbal consent to proceed: Yes. This telehealth encounter occurred during the COVID-19 pandemic and public health emergency. Evaluation of the following organ systems was limited: Vitals/Constitutional/EENT/Resp/CV/GI//MS/Neuro/Skin/Heme-Lymph-Imm.   Pursuant to the emergency declaration under the [address] [address] and Response Supplemental Appropriations Act, this Virtual Visit was conducted with patient's (and/or legal guardian's) consent, to reduce the risk of exposure to COVID-19 and provide necessary medical care. --Bre Miranda RN on 9/1/2020 at 3:35 PM    An electronic signature was used to authenticate this note. I was in the office for the appointment and time spent: 47 minutes  Diabetes Skills, Confidence & Preparedness Index (SCPI) ©  Thank you for completing the Skills, Confidence & Preparedness Index! Below are your scores. All scales and questions are out of 7. If you would like these results emailed, please enter your email address along with some identifying patient information. Email: Patient Identifier:    Overall SCPI score: 5.3 Skills Score: 4.7  Low: Taking Medication(Q2),Reducing Risks(Q5) Confidence Score: 5.6  Low: Reducing Risks(Q3) Preparedness Score: 6.0  Low: Healthy Eating(Q1),Being Active(Q2),Healthy Coping(Q3),Reducing Risks(Q4),Taking Medication(Q5),Blood Sugar Monitoring(Q6),Problem Solving(Q7)   Healthy Eating Score: 5.8  Low: Skills(Q1) Taking Medication Score: 4.0  Low: Skills(Q2) Blood Sugar Monitoring Score: 5.8  Low: YQRGNC(E5) Reducing Risks Score: 4.3  Low: Skills(Q5)   Problem Solving Score: 6.0  Low: Skills(Q6),Confidence(Q7),Preparedness(Q7) Healthy Coping Score: 5.7  Low: BXHIII(N2) Being Active Score: 5.5  Low: Confidence(Q5)   Skills/Knowledge Questions   1. I know how to plan meals that have the best balance between carbohydrates, proteins and vegetables. 5   2. I know how my diabetes medications (pills, injectables and/or insulin) work in my body. 2   3. I know when to check my blood sugar if I want to see how my body responded to a meal. 6   4. I know when to check my blood sugars to determine if my medication or insulin doses are correct. 5   5. I know what to do to prevent a low blood sugar when I exercise (either before, during, or after). 2   6.  When I am sick, I know what to do differently with my diabetes management. 6   7. I know how stress can affect my diabetes management. 5   8. When I look at my blood sugars over a given week, I can explain what my blood sugar pattern is. 6   9. I know what my target levels are for A1c, blood pressure and cholesterol. 5   Confidence Questions   1. I am confident that I can plan balanced meals and snacks. 6   2. I am confident that I can manage my stress. 6   3. I am confident that I can prevent a low blood sugar during or after exercise. 4   4. I am confident that the next time I eat out, I will be able to choose foods that best keep my blood sugars in target. 6   5. I am confident I can include exercise into my schedule. 5   6. I am confident that I can use my daily blood sugars to adjust my diet, my activity, and/or my insulin. 6   7. When something out of my normal routine happens, I am confident that I can problem-solve and keep my diabetes on track. 6   Preparedness Questions   1. Within the next month, I will begin to eat more balanced meals and snacks. 6   2. Within the next month, I will choose an exercise activity and I will start fitting it into my schedule. 6   3. Within the next month, I will make a list of stress management options that work for me. 6   4. Within the next month, I will consistently plan ahead to prevent low blood sugars. 6   5. Within the next month, I will start adjusting my insulin doses on my own. 6   6. Within the next month, I will begin making changes to my diabetes management based on my daily blood sugars (eg - eating, activity and/or insulin). 6   7. Within the next month, I will begin making changes to my diabetes management to meet my overall goals (eg - eating, activity and/or insulin).

## 2020-09-02 ENCOUNTER — TELEPHONE (OUTPATIENT)
Dept: INTERNAL MEDICINE CLINIC | Age: 65
End: 2020-09-02

## 2020-09-02 NOTE — TELEPHONE ENCOUNTER
Patient phoned and states she did not wish to start the Lisinopril at this time as her blood pressures have been running normal. I advised her to keep her appointment on 9-9-20 and we will discuss it further at that time.

## 2020-09-09 ENCOUNTER — OFFICE VISIT (OUTPATIENT)
Dept: INTERNAL MEDICINE CLINIC | Age: 65
End: 2020-09-09
Payer: MEDICARE

## 2020-09-09 VITALS
WEIGHT: 141 LBS | HEART RATE: 84 BPM | DIASTOLIC BLOOD PRESSURE: 82 MMHG | BODY MASS INDEX: 24.98 KG/M2 | SYSTOLIC BLOOD PRESSURE: 140 MMHG | OXYGEN SATURATION: 100 % | HEIGHT: 63 IN | RESPIRATION RATE: 14 BRPM | TEMPERATURE: 98.5 F

## 2020-09-09 DIAGNOSIS — I10 ESSENTIAL HYPERTENSION: Primary | ICD-10-CM

## 2020-09-09 DIAGNOSIS — Z79.899 ENCOUNTER FOR DRUG THERAPY: ICD-10-CM

## 2020-09-09 DIAGNOSIS — E78.2 MIXED HYPERLIPIDEMIA: ICD-10-CM

## 2020-09-09 DIAGNOSIS — E11.9 NEW ONSET TYPE 2 DIABETES MELLITUS (HCC): ICD-10-CM

## 2020-09-09 PROCEDURE — G8432 DEP SCR NOT DOC, RNG: HCPCS | Performed by: INTERNAL MEDICINE

## 2020-09-09 PROCEDURE — 3046F HEMOGLOBIN A1C LEVEL >9.0%: CPT | Performed by: INTERNAL MEDICINE

## 2020-09-09 PROCEDURE — G8427 DOCREV CUR MEDS BY ELIG CLIN: HCPCS | Performed by: INTERNAL MEDICINE

## 2020-09-09 PROCEDURE — 1101F PT FALLS ASSESS-DOCD LE1/YR: CPT | Performed by: INTERNAL MEDICINE

## 2020-09-09 PROCEDURE — 3017F COLORECTAL CA SCREEN DOC REV: CPT | Performed by: INTERNAL MEDICINE

## 2020-09-09 PROCEDURE — G8420 CALC BMI NORM PARAMETERS: HCPCS | Performed by: INTERNAL MEDICINE

## 2020-09-09 PROCEDURE — G8400 PT W/DXA NO RESULTS DOC: HCPCS | Performed by: INTERNAL MEDICINE

## 2020-09-09 PROCEDURE — 1090F PRES/ABSN URINE INCON ASSESS: CPT | Performed by: INTERNAL MEDICINE

## 2020-09-09 PROCEDURE — G8536 NO DOC ELDER MAL SCRN: HCPCS | Performed by: INTERNAL MEDICINE

## 2020-09-09 PROCEDURE — 99214 OFFICE O/P EST MOD 30 MIN: CPT | Performed by: INTERNAL MEDICINE

## 2020-09-09 PROCEDURE — G9899 SCRN MAM PERF RSLTS DOC: HCPCS | Performed by: INTERNAL MEDICINE

## 2020-09-09 PROCEDURE — 2022F DILAT RTA XM EVC RTNOPTHY: CPT | Performed by: INTERNAL MEDICINE

## 2020-09-09 RX ORDER — GLIPIZIDE 5 MG/1
2.5 TABLET ORAL 2 TIMES DAILY
Qty: 90 TAB | Refills: 0 | Status: SHIPPED | OUTPATIENT
Start: 2020-09-09 | End: 2020-11-27 | Stop reason: SDUPTHER

## 2020-09-09 RX ORDER — METFORMIN HYDROCHLORIDE 500 MG/1
1000 TABLET ORAL 2 TIMES DAILY WITH MEALS
Qty: 360 TAB | Refills: 1 | Status: SHIPPED | OUTPATIENT
Start: 2020-09-09 | End: 2021-03-29 | Stop reason: SDUPTHER

## 2020-09-09 NOTE — PERIOP NOTES
[facility]  Ambulatory Surgery Unit  Pre-operative Instructions for Endo Procedures    Procedure Date  9/16            Tentative Arrival Time 12:45pm      1. On the day of your procedure, please report to the Ambulatory Surgery Unit Registration Desk and sign in at your designated time. The Ambulatory Surgery Unit is located in [facility] on the [facility] side of the [facility] across from the [address] [place] [address]. Please have all of your health insurance cards and a photo ID. 2. You must have someone with you to drive you home, as you should not drive a car for 24 hours following anesthesia. Please make arrangements for a responsible adult friend or family member to stay with you for at least the first 24 hours after your procedure. 3. Do not have anything to eat or drink (including water, gum, mints, coffee, juice) after 11:59 PM 9/15. This may not apply to medications prescribed by your physician. (Please note below the special instructions with medications to take the morning of your procedure.)    4. If applicable, follow the clear liquid diet and bowel prep instructions provided by your physician's office. If you do not have this information, or have any questions, please contact your physician's office. 5. We recommend you do not drink any alcoholic beverages for 24 hours before and after your procedure. 6. Contact your surgeons office for instructions on the following medications: non-steroidal anti-inflammatory drugs (i.e. Advil, Aleve), vitamins, and supplements. (Some surgeons will want you to stop these medications prior to surgery and others may allow you to take them)   **If you are currently taking Plavix, Coumadin, Aspirin and/or other blood-thinning agents, contact your surgeon for instructions. ** Your surgeon will partner with the physician prescribing these medications to determine if it is safe to stop or if you need to continue taking.  Please do not stop taking these medications without instructions from your surgeon. 7. In an effort to help prevent surgical site infection, we ask that you shower with an anti-bacterial soap (i.e. Dial or Safeguard) on the morning of your procedure. Do not apply any lotions, powders, or deodorants after showering. 8. Wear comfortable clothes. Wear glasses instead of contacts. Do not bring any jewelry or money (other than copays or fees as instructed). Do not wear make-up, particularly mascara, the morning of your procedure. Wear your hair loose or down, no ponytails, buns, beth pins or clips. All body piercings must be removed. 9. You should understand that if you do not follow these instructions your procedure may be cancelled. If your physical condition changes (i.e. fever, cold or flu) please contact your surgeon as soon as possible. 10. It is important that you be on time. If a situation occurs where you may be late, or if you have any questions or problems, please call [phone]. 11. Your procedure time may be subject to change. You will receive a phone call the day prior to confirm your arrival time. Special Instructions: Take all medications and inhalers, as prescribed, on the morning of surgery with a sip of water EXCEPT: diabetic medications      Insulin Dependent Diabetic patients: Take your diabetic medications as prescribed the day before surgery. Hold all diabetic medications the day of surgery. If you are scheduled to arrive for surgery after 8:00 AM, and your AM blood sugar is >200, please call Ambulatory Surgery. I understand a pre-operative phone call will be made to verify my procedure time. In the event that I am not available, I give permission for a message to be left on my answering service and/or with another person? yes      Reviewed by phone with pt, verbalized understanding.   Pt aware of recommendation to self quarantine post covid testing.   ___________________ ___________________      ___________________  (Signature of Patient)          (Witness)                   (Date and Time)

## 2020-09-09 NOTE — PROGRESS NOTES
Adalid Puga is a 76 y.o. female presenting for Diabetes (2 week)  . 1. Have you been to the ER, urgent care clinic since your last visit? Hospitalized since your last visit? No    2. Have you seen or consulted any other health care providers outside of the [address] [place] [address] since your last visit? Include any pap smears or colon screening. No    Fall Risk Assessment, last 12 mths 8/12/2020   Able to walk? Yes   Fall in past 12 months? No         Abuse Screening Questionnaire 8/12/2020   Do you ever feel afraid of your partner? N   Are you in a relationship with someone who physically or mentally threatens you? N   Is it safe for you to go home? Y       3 most recent PHQ Screens 8/12/2020   Little interest or pleasure in doing things Not at all   Feeling down, depressed, irritable, or hopeless Not at all   Total Score PHQ 2 0       There are no discontinued medications.

## 2020-09-09 NOTE — PATIENT INSTRUCTIONS
Learning About Type 2 Diabetes What is type 2 diabetes? Insulin is a hormone that helps your body use sugar from your food as energy. Type 2 diabetes happens when your body can't use insulin the right way. Over time, the pancreas can't make enough insulin. If you don't have enough insulin, too much sugar stays in your blood. If you are overweight, get little or no exercise, or have type 2 diabetes in your family, you are more likely to have problems with the way insulin works in your body.  Americans, Hispanics, Native Americans,  Americans, and Pacific Islanders have a higher risk for type 2 diabetes. Type 2 diabetes can be prevented or delayed with a healthy lifestyle, which includes staying at a healthy weight, making smart food choices, and getting regular exercise. What can you expect with type 2 diabetes? Diana Nolan keep hearing about how important it is to keep your blood sugar within a target range. That's because over time, high blood sugar can lead to serious problems. It can: 
· Harm your eyes, nerves, and kidneys. · Damage your blood vessels, leading to heart disease and stroke. · Reduce blood flow and cause nerve damage to parts of your body, especially your feet. This can cause slow healing and pain when you walk. · Make your immune system weak and less able to fight infections. When people hear the word \"diabetes,\" they often think of problems like these. But daily care and treatment can help prevent or delay these problems. The goal is to keep your blood sugar in a target range. That's the best way to reduce your chance of having more problems from diabetes. What are the symptoms? Some people who have type 2 diabetes may not have any symptoms early on. Many people with the disease don't even know they have it at first. But with time, diabetes starts to cause symptoms. You experience most symptoms of type 2 diabetes when your blood sugar is either too high or too low. The most common symptoms of high blood sugar include: · Thirst. 
· Frequent urination. · Weight loss. · Blurry vision. The symptoms of low blood sugar include: · Sweating. · Shakiness. · Weakness. · Hunger. · Confusion. How can you prevent type 2 diabetes? The best way to prevent or delay type 2 diabetes is to adopt healthy habits, which include: 
· Staying at a healthy weight. · Exercising regularly. · Eating healthy foods. How is type 2 diabetes treated? If you have type 2 diabetes, here are the most important things you can do. · Take your diabetes medicines. · Check your blood sugar as often as your doctor recommends. Also, get a hemoglobin A1c test at least every 6 months. · Try to eat a variety of foods and to spread carbohydrate throughout the day. Carbohydrate raises blood sugar higher and more quickly than any other nutrient does. Carbohydrate is found in sugar, breads and cereals, fruit, starchy vegetables such as potatoes and corn, and milk and yogurt. · Get at least 30 minutes of exercise on most days of the week. Walking is a good choice. You also may want to do other activities, such as running, swimming, cycling, or playing tennis or team sports. If your doctor says it's okay, do muscle-strengthening exercises at least 2 times a week. · See your doctor for checkups and tests on a regular schedule. · If you have high blood pressure or high cholesterol, take the medicines as prescribed by your doctor. · Do not smoke. Smoking can make health problems worse. This includes problems you might have with type 2 diabetes. If you need help quitting, talk to your doctor about stop-smoking programs and medicines. These can increase your chances of quitting for good. Follow-up care is a key part of your treatment and safety. Be sure to make and go to all appointments, and call your doctor if you are having problems.  It's also a good idea to know your test results and keep a list of the medicines you take. Where can you learn more? Go to http://venancio-zach.info/ Enter M035 in the search box to learn more about \"Learning About Type 2 Diabetes. \" Current as of: December 20, 2019               Content Version: 12.6 © 8293-3820 [employer]. Care instructions adapted under license by [employer] (which disclaims liability or warranty for this information). If you have questions about a medical condition or this instruction, always ask your healthcare professional. [address] any warranty or liability for your use of this information. Learning About Type 2 Diabetes What is type 2 diabetes? Insulin is a hormone that helps your body use sugar from your food as energy. Type 2 diabetes happens when your body can't use insulin the right way. Over time, the pancreas can't make enough insulin. If you don't have enough insulin, too much sugar stays in your blood. If you are overweight, get little or no exercise, or have type 2 diabetes in your family, you are more likely to have problems with the way insulin works in your body.  Americans, Hispanics, Native Americans,  Americans, and Pacific Islanders have a higher risk for type 2 diabetes. Type 2 diabetes can be prevented or delayed with a healthy lifestyle, which includes staying at a healthy weight, making smart food choices, and getting regular exercise. What can you expect with type 2 diabetes? Daylene Grade keep hearing about how important it is to keep your blood sugar within a target range. That's because over time, high blood sugar can lead to serious problems. It can: 
· Harm your eyes, nerves, and kidneys. · Damage your blood vessels, leading to heart disease and stroke. · Reduce blood flow and cause nerve damage to parts of your body, especially your feet. This can cause slow healing and pain when you walk. · Make your immune system weak and less able to fight infections. When people hear the word \"diabetes,\" they often think of problems like these. But daily care and treatment can help prevent or delay these problems. The goal is to keep your blood sugar in a target range. That's the best way to reduce your chance of having more problems from diabetes. What are the symptoms? Some people who have type 2 diabetes may not have any symptoms early on. Many people with the disease don't even know they have it at first. But with time, diabetes starts to cause symptoms. You experience most symptoms of type 2 diabetes when your blood sugar is either too high or too low. The most common symptoms of high blood sugar include: · Thirst. 
· Frequent urination. · Weight loss. · Blurry vision. The symptoms of low blood sugar include: · Sweating. · Shakiness. · Weakness. · Hunger. · Confusion. How can you prevent type 2 diabetes? The best way to prevent or delay type 2 diabetes is to adopt healthy habits, which include: 
· Staying at a healthy weight. · Exercising regularly. · Eating healthy foods. How is type 2 diabetes treated? If you have type 2 diabetes, here are the most important things you can do. · Take your diabetes medicines. · Check your blood sugar as often as your doctor recommends. Also, get a hemoglobin A1c test at least every 6 months. · Try to eat a variety of foods and to spread carbohydrate throughout the day. Carbohydrate raises blood sugar higher and more quickly than any other nutrient does. Carbohydrate is found in sugar, breads and cereals, fruit, starchy vegetables such as potatoes and corn, and milk and yogurt. · Get at least 30 minutes of exercise on most days of the week. Walking is a good choice. You also may want to do other activities, such as running, swimming, cycling, or playing tennis or team sports.  If your doctor says it's okay, do muscle-strengthening exercises at least 2 times a week. · See your doctor for checkups and tests on a regular schedule. · If you have high blood pressure or high cholesterol, take the medicines as prescribed by your doctor. · Do not smoke. Smoking can make health problems worse. This includes problems you might have with type 2 diabetes. If you need help quitting, talk to your doctor about stop-smoking programs and medicines. These can increase your chances of quitting for good. Follow-up care is a key part of your treatment and safety. Be sure to make and go to all appointments, and call your doctor if you are having problems. It's also a good idea to know your test results and keep a list of the medicines you take. Where can you learn more? Go to http://venancio-zach.info/ Enter E148 in the search box to learn more about \"Learning About Type 2 Diabetes. \" Current as of: December 20, 2019               Content Version: 12.6 © 4450-6496 [employer]. Care instructions adapted under license by [employer] (which disclaims liability or warranty for this information). If you have questions about a medical condition or this instruction, always ask your healthcare professional. [address] any warranty or liability for your use of this information. [employer]herent[employer]

## 2020-09-09 NOTE — PROGRESS NOTES
This note will not be viewable in [address] Kadi Lao is a 76 y.o. female and presents with Diabetes (2 week)      Subjective:  Patient comes in today for follow-up. Patient was started on metformin, glipizide, Lantus during her last office visit. She was a newly diagnosed diabetic. Referral for diabetic education was placed as well. Her A1c was 11. She reports her fasting blood pressures have averaged around 96100. She denies any hypoglycemic events. She has been tolerating the Metformin well without any GI side effects. She is currently on 10 units of Lantus at night. Essential hypertensionnormotensive. Patient reports her blood pressures have been low borderline at home. She has white coat syndrome. I prescribed lisinopril 5 mg given she had hypertension and microalbuminuria. She did not take lisinopril due to low blood pressures and feeling lightheaded. Recap-Patient is a newly diagnosed diabetic with microalbuminuria Her A1c is high at 11. She is never been on any oral antidiabetic medications. She denies numbness or tingling sensation upper lower extremity. Denies shaking chills or hypoglycemic events. Denies polyuria, polydipsia. Dyslipidemiaher triglycerides are concerning as well. Not on any statin. Essential hypertensionnot on any antihypertensives. Recap-Patient comes in today to establish care. She is a new patient to our practice. She is never had a PCP in the past.  She lives with her 3 daughters  [facility]. 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 One of her daughter works in [employer]. The second daughter is mentally challenged and she takes care of her. She has a past medical history of type 2 diabetes and was on metformin in the past.  She reports she ran out of the medications and was not financially capable of setting up doctor visits since she was uninsured at that point.   She does not check her blood sugars regularly. She does not have a glucose meter. She denies hypoglycemic episodes, polyuria, polydipsia, numbness or tingling sensation in upper or lower extremity. Sinus tachycardia on presentation. Which again predates her mild anxiety of coming to the doctor's office. Denies any racing of heart, chest pain or anginal symptoms. EKG was done in office which was personally reviewed by me and showed sinus tachycardia, heart rate 112. Past Medical History:   Diagnosis Date    Diabetes [facility])      Past Surgical History:   Procedure Laterality Date    HX GYN      removed overy on the left side     No Known Allergies  Current Outpatient Medications   Medication Sig Dispense Refill    metFORMIN (GLUCOPHAGE) 500 mg tablet Take 2 Tabs by mouth two (2) times daily (with meals) for 90 days. Indications: type 2 diabetes mellitus 360 Tab 1    glipiZIDE (GLUCOTROL) 5 mg tablet Take 0.5 Tabs by mouth two (2) times a day for 90 days. Indications: type 2 diabetes mellitus 90 Tab 0    ergocalciferol (ERGOCALCIFEROL) 1,250 mcg (50,000 unit) capsule Take 1 Cap by mouth every seven (7) days for 12 doses. Indications: vitamin D deficiency (high dose therapy) 12 Cap 0    Blood-Glucose Meter monitoring kit As directed 1 Kit 0    glucose blood VI test strips (blood glucose test) strip Check BSL 3 times a day 100 Strip 0    lancets misc Test BG three times a day 100 Each 0    atorvastatin (LIPITOR) 20 mg tablet Take 1 Tab by mouth daily for 90 days. 90 Tab 0    insulin glargine (LANTUS,BASAGLAR) 100 unit/mL (3 mL) inpn 10 Units by SubCUTAneous route nightly. Indications: type 2 diabetes mellitus 1 Adjustable Dose Pre-filled Pen Syringe 1    Insulin Needles, Disposable, 31 gauge x 5/16\" ndle Daily use 1 Package 11    multivitamin (ONE A DAY) tablet Take 1 Tab by mouth daily.        Social History     Socioeconomic History    Marital status: SINGLE     Spouse name: Not on file    Number of children: Not on file    Years of education: Not on file    Highest education level: Not on file   Tobacco Use    Smoking status: Never Smoker    Smokeless tobacco: Never Used   Substance and Sexual Activity    Alcohol use: Never     Frequency: Never    Drug use: Never    Sexual activity: Not Currently     Family History   Problem Relation Age of Onset    Alzheimer Mother     No Known Problems Father        Review of Systems  ROS is unremarkable except for ones highlighted in bold.    Constitutional: negative for fevers, chills, anorexia and weight loss  Eyes:   negative for visual disturbance and irritation  ENT:   negative for tinnitus,sore throat,nasal congestion,ear pain,hoarseness  Respiratory:  negative for cough, hemoptysis, dyspnea,wheezing  CV:   negative for chest pain, palpitations, lower extremity edema  GI:   negative for nausea, vomiting, diarrhea, abdominal pain,melena  Endo:               negative for polyuria,polydipsia,polyphagia,heat intolerance  Genitourinary: negative for frequency, dysuria and hematuria  Integumentary: negative for rash and pruritus  Hematologic:  negative for easy bruising and gum/nose bleeding  Musculoskel: negative for myalgias, arthralgias, back pain, muscle weakness, joint pain  Neurological:  negative for headaches, dizziness, vertigo, memory problems and gait   Behavl/Psych: negative for feelings of anxiety, depression, mood changes  ROS otherwise negative     Objective:  Visit Vitals  /82 (BP 1 Location: Left arm, BP Patient Position: Sitting)   Pulse 84   Temp 98.5 °F (36.9 °C)   Resp 14   Ht 5' 3\" (1.6 m)   Wt 141 lb (64 kg)   SpO2 100%   BMI 24.98 kg/m²     Physical Exam:   General appearance - alert, well appearing, and in no distress  Mental status - alert, oriented to person, place, and time  EYE-SHAINA, EOMI, fundi normal, corneas normal, no foreign bodies  ENT-ENT exam normal, no neck nodes or sinus tenderness  Nose - normal and patent, no erythema, discharge or polyps  Mouth - mucous membranes moist, pharynx normal without lesions  Neck - supple, no significant adenopathy   Chest - clear to auscultation, no wheezes, rales or rhonchi, symmetric air entry   Heart - normal rate, regular rhythm, normal S1, S2, no murmurs, rubs, clicks or gallops   Abdomen - soft, nontender, nondistended, no masses or organomegaly  Lymph- no adenopathy palpable  Ext-peripheral pulses normal, no pedal edema, no clubbing or cyanosis  Skin-Warm and dry.  no hyperpigmentation, vitiligo, or suspicious lesions  Neuro -alert, oriented, normal speech, no focal findings or movement disorder noted  Musculoskeletal- FROM, no bony abnormalities, no point tenderness    Lab Review:  Results for orders placed or performed in visit on 08/12/20   CULTURE, URINE    Specimen: Urine   Result Value Ref Range    Urine Culture, Routine (A)      Escherichia coli  Greater than 100,000 colony forming units per mL         Susceptibility    Escherichia coli -  (no method available)*     Amoxicillin/Clavulanic A S Susceptible ug/mL     Ampicillin ($) R Resistant ug/mL     Cefepime ($$) S Susceptible ug/mL     Ceftriaxone ($) S Susceptible ug/mL     Cefuroxime ($) S Susceptible ug/mL     Ciprofloxacin ($) R Resistant ug/mL     Ertapenem ($$$$) S Susceptible ug/mL     Gentamicin ($) S Susceptible ug/mL     Imipenem S Susceptible ug/mL     Levofloxacin ($) R Resistant ug/mL     Meropenem ($$) S Susceptible ug/mL     Nitrofurantoin S Susceptible ug/mL     Piperacillin/Tazobac ($) S Susceptible ug/mL     Tetracycline S Susceptible ug/mL     Tobramycin ($) S Susceptible ug/mL     Trimeth-Sulfamethoxa R Resistant ug/mL     * Performed at:  [address] [place] [address]  [id number] Director: Keyonna Pyle MD, Phone:  [phone]   CBC W/O DIFF   Result Value Ref Range    WBC 5.7 4.1 - 10.9 K/uL    RBC 4.42 4.20 - 6.30 M/uL    HGB 12.3 12.0 - 18.0 g/dL    HCT 37.5 37.0 - 51.0 %    MCH 27.8 26.0 - 32.0 pg    MCHC 32.8 30.0 - 36.0 g/dL    MCV 84.9 80.0 - 97.0 fL    RDW 14.2 %    PLATELET 932.0 640.5 - 440.0 K/uL   LIPID PANEL   Result Value Ref Range    Cholesterol, total 205 (H) 0 - 200 mg/dL    Triglyceride 389 (H) 0 - 200 mg/dL    HDL Cholesterol 44 35 - 130 mg/dL    VLDL 78 mg/dL    LDL, calculated 83 0 - 130 mg/dL    CHOL/HDL Ratio 5 (H) 0 - 4 Ratio    LDL/HDL Ratio 2 Ratio   METABOLIC PANEL, COMPREHENSIVE   Result Value Ref Range    Glucose 392 (H) 65 - 105 mg/dL    BUN 10.0 7.0 - 17.0 mg/dL    Creatinine 0.5 (L) 0.7 - 1.2 mg/dL    Sodium 139 137 - 145 mmol/L    Potassium 4.0 3.6 - 5.0 mmol/L    Chloride 98 98 - 107 mmol/L    CO2 30.0 22.0 - 32.0 mmol/L    Calcium 10.3 (H) 8.4 - 10.2 mg/dl    Protein, total 7.9 6.3 - 8.2 g/dL    Albumin 4.3 3.9 - 5.4 g/dL    ALT (SGPT) 22 0 - 35 U/L    AST (SGOT) 24.0 14.0 - 36.0 U/L    Alk.  phosphatase 157 (H) 38 - 126 U/L    Bilirubin, total 0.6 0.2 - 1.3 mg/dL    BUN/Creatinine ratio 20 Ratio    GFR est AA >60 mL/min/1.73m2    GFR est non-AA >60 mL/min/1.73m2    Globulin 3.60     A-G Ratio 1.2 Ratio    Anion gap 11 mmol/L   HEMOGLOBIN A1C W/O EAG   Result Value Ref Range    Hemoglobin A1c 11.1 (H) 4.0 - 5.7 %   VITAMIN D, 25 HYDROXY   Result Value Ref Range    VITAMIN D, 25-HYDROXY 19 (L) 30 - 96 ng/mL   TSH 3RD GENERATION   Result Value Ref Range    TSH, 3rd generation 2.71 0.34 - 5.60 uIU/mL   URINE, MICROALBUMIN, SEMIQUANTITATIVE   Result Value Ref Range    Microalbumin, Urine 100 (A) 0 - 20 mg/L   URINALYSIS W/MICROSCOPIC   Result Value Ref Range    Color Pale Yellow Pale Yellow - Yellow    CLARITY very cloudy (A) Clear    Glucose urine, 24 hr 4+ (A) Negative    Ketone Negative Negative    Bilirubin Negative Negative    Specific gravity 1.015 1.000 - 1.030    pH (UA) 6.5 5 - 7    Blood 2+ (A) Negative    Protein 2+ (A) Negative    Urobilinogen Negative Negative    Nitrites Negative Negative    Leukocyte Esterase 3+ (A) Negative    WBC TNTC (A) 0 #/HPF RBC 2-5 (A) 0 #/HPF    Bacteria Few (A) None Seen    Yeast w/hyphae Many (A) None Seen        Documenation Review:    Assessment/Plan:    Diagnoses and all orders for this visit:    1. Essential hypertension    2. Mixed hyperlipidemia    3. New onset type 2 diabetes mellitus (HCC)  -     metFORMIN (GLUCOPHAGE) 500 mg tablet; Take 2 Tabs by mouth two (2) times daily (with meals) for 90 days. Indications: type 2 diabetes mellitus  -     glipiZIDE (GLUCOTROL) 5 mg tablet; Take 0.5 Tabs by mouth two (2) times a day for 90 days. Indications: type 2 diabetes mellitus    4. Encounter for drug therapy        Significant time spent on counseling patient on the need to be compliant with diabetic medications, including risks of damage to renal, cardiac and nervous systems. Instructed the patient to increase metformin 1000 mg p.o. twice daily. Decrease glipizide to 2.5 mg p.o. twice daily. Continue Lantus at 10 units at night. Discussed lifestyle modifications including Na restriction, low carb/fat diet, weight reduction and exercise (at least a walking program). Overdue for all her health screenings immunization. Referral for gynecologist placed to get a pelvic examination and Pap smears. Referral for colonoscopy for colon cancer screening please. She reports she had a colonoscopy done 5 years back. She had a history of personal colon polyp s/p polypectomy in the past.  Her mammogram was 2 years back which was unremarkable per patient. She is overdue for 1. Patient has not had any immunizations. She was offered Pneumovax 23 today however she declined. She needs Pneumovax 23, Shingrix, DTaP, flu vaccine. Referral for ophthalmologist for eye examination. ICD-10-CM ICD-9-CM    1. Essential hypertension  I10 401.9    2. Mixed hyperlipidemia  E78.2 272.2    3. New onset type 2 diabetes mellitus (HCC)  E11.9 250.00 metFORMIN (GLUCOPHAGE) 500 mg tablet      glipiZIDE (GLUCOTROL) 5 mg tablet   4.  Encounter for drug therapy  Z79.899 V58.69          Follow-up and Dispositions    · Return in about 3 months (around 12/9/2020) for follow up. I have reviewed with the patient details of the assessment and plan and all questions were answered. Relevent patient education was performed. Verbal and/or written instructions (see AVS) provided. The most recent lab findings were reviewed with the patient. Plan was discussed with patient who verbally expressed understanding. An After Visit Summary was printed and given to the patient.     Srinivas Fox MD

## 2020-09-12 ENCOUNTER — HOSPITAL ENCOUNTER (OUTPATIENT)
Dept: PREADMISSION TESTING | Age: 65
Discharge: HOME | End: 2020-09-12
Payer: MEDICARE

## 2020-09-12 PROCEDURE — 87635 SARS-COV-2 COVID-19 AMP PRB: CPT

## 2020-09-14 ENCOUNTER — TELEPHONE (OUTPATIENT)
Dept: INTERNAL MEDICINE CLINIC | Age: 65
End: 2020-09-14

## 2020-09-14 LAB
HEALTH STATUS, XMCV2T: NORMAL
SARS-COV-2, COV2NT: NOT DETECTED
SOURCE, COVRS: NORMAL
SPECIMEN SOURCE, FCOV2M: NORMAL
SPECIMEN TYPE, XMCV1T: NORMAL

## 2020-09-14 NOTE — TELEPHONE ENCOUNTER
Patient called requesting her bone density results. Patient also wanted to let Dr. Jeanne Mo know she went to her OBGYN today and had her pap smear done.  Her mammogram is set for 12/17/2020  CB: [phone]

## 2020-09-15 ENCOUNTER — ANESTHESIA EVENT (OUTPATIENT)
Dept: SURGERY | Age: 65
End: 2020-09-15
Payer: MEDICARE

## 2020-09-15 NOTE — TELEPHONE ENCOUNTER
Deangelo Bradford MD  You 5 hours ago (8:35 AM)       Reviewed DEXA scan results-normal.  No osteopenia/osteoporosis detected.     Message text          Patient notified of results

## 2020-09-16 ENCOUNTER — ANESTHESIA (OUTPATIENT)
Dept: SURGERY | Age: 65
End: 2020-09-16
Payer: MEDICARE

## 2020-09-16 ENCOUNTER — HOSPITAL ENCOUNTER (OUTPATIENT)
Age: 65
Setting detail: OUTPATIENT SURGERY
Discharge: HOME | End: 2020-09-16
Attending: SURGERY | Admitting: SURGERY
Payer: MEDICARE

## 2020-09-16 VITALS
TEMPERATURE: 97.7 F | BODY MASS INDEX: 24.1 KG/M2 | WEIGHT: 136 LBS | DIASTOLIC BLOOD PRESSURE: 69 MMHG | HEART RATE: 69 BPM | OXYGEN SATURATION: 100 % | RESPIRATION RATE: 13 BRPM | HEIGHT: 63 IN | SYSTOLIC BLOOD PRESSURE: 141 MMHG

## 2020-09-16 LAB
GLUCOSE BLD STRIP.AUTO-MCNC: 104 MG/DL (ref 65–100)
GLUCOSE BLD STRIP.AUTO-MCNC: 122 MG/DL (ref 65–100)
SERVICE CMNT-IMP: ABNORMAL
SERVICE CMNT-IMP: ABNORMAL

## 2020-09-16 PROCEDURE — 82962 GLUCOSE BLOOD TEST: CPT

## 2020-09-16 PROCEDURE — 74011250636 HC RX REV CODE- 250/636

## 2020-09-16 PROCEDURE — 76030000002 HC AMB SURG OR TIME FIRST 0.: Performed by: SURGERY

## 2020-09-16 PROCEDURE — 74011000250 HC RX REV CODE- 250

## 2020-09-16 PROCEDURE — 76210000050 HC AMBSU PH II REC 0.5 TO 1 HR: Performed by: SURGERY

## 2020-09-16 PROCEDURE — 77030009426 HC FCPS BIOP ENDOSC BSC -B: Performed by: SURGERY

## 2020-09-16 PROCEDURE — 76060000073 HC AMB SURG ANES FIRST 0.5 HR: Performed by: SURGERY

## 2020-09-16 PROCEDURE — 74011250636 HC RX REV CODE- 250/636: Performed by: ANESTHESIOLOGY

## 2020-09-16 PROCEDURE — 88305 TISSUE EXAM BY PATHOLOGIST: CPT

## 2020-09-16 PROCEDURE — 2709999900 HC NON-CHARGEABLE SUPPLY: Performed by: SURGERY

## 2020-09-16 PROCEDURE — 77030021352 HC CBL LD SYS DISP COVD -B: Performed by: SURGERY

## 2020-09-16 RX ORDER — ONDANSETRON 2 MG/ML
4 INJECTION INTRAMUSCULAR; INTRAVENOUS AS NEEDED
Status: DISCONTINUED | OUTPATIENT
Start: 2020-09-16 | End: 2020-09-16 | Stop reason: HOSPADM

## 2020-09-16 RX ORDER — LIDOCAINE HYDROCHLORIDE 10 MG/ML
0.1 INJECTION, SOLUTION EPIDURAL; INFILTRATION; INTRACAUDAL; PERINEURAL AS NEEDED
Status: DISCONTINUED | OUTPATIENT
Start: 2020-09-16 | End: 2020-09-16 | Stop reason: HOSPADM

## 2020-09-16 RX ORDER — SODIUM CHLORIDE 0.9 % (FLUSH) 0.9 %
5-40 SYRINGE (ML) INJECTION EVERY 8 HOURS
Status: DISCONTINUED | OUTPATIENT
Start: 2020-09-16 | End: 2020-09-16 | Stop reason: HOSPADM

## 2020-09-16 RX ORDER — DIPHENHYDRAMINE HYDROCHLORIDE 50 MG/ML
12.5 INJECTION, SOLUTION INTRAMUSCULAR; INTRAVENOUS AS NEEDED
Status: DISCONTINUED | OUTPATIENT
Start: 2020-09-16 | End: 2020-09-16 | Stop reason: HOSPADM

## 2020-09-16 RX ORDER — SODIUM CHLORIDE 0.9 % (FLUSH) 0.9 %
5-40 SYRINGE (ML) INJECTION AS NEEDED
Status: DISCONTINUED | OUTPATIENT
Start: 2020-09-16 | End: 2020-09-16 | Stop reason: HOSPADM

## 2020-09-16 RX ORDER — FENTANYL CITRATE 50 UG/ML
25 INJECTION, SOLUTION INTRAMUSCULAR; INTRAVENOUS
Status: DISCONTINUED | OUTPATIENT
Start: 2020-09-16 | End: 2020-09-16 | Stop reason: HOSPADM

## 2020-09-16 RX ORDER — PROPOFOL 10 MG/ML
INJECTION, EMULSION INTRAVENOUS AS NEEDED
Status: DISCONTINUED | OUTPATIENT
Start: 2020-09-16 | End: 2020-09-16 | Stop reason: HOSPADM

## 2020-09-16 RX ORDER — LIDOCAINE HYDROCHLORIDE 20 MG/ML
INJECTION, SOLUTION EPIDURAL; INFILTRATION; INTRACAUDAL; PERINEURAL AS NEEDED
Status: DISCONTINUED | OUTPATIENT
Start: 2020-09-16 | End: 2020-09-16 | Stop reason: HOSPADM

## 2020-09-16 RX ORDER — SODIUM CHLORIDE, SODIUM LACTATE, POTASSIUM CHLORIDE, CALCIUM CHLORIDE 600; 310; 30; 20 MG/100ML; MG/100ML; MG/100ML; MG/100ML
25 INJECTION, SOLUTION INTRAVENOUS CONTINUOUS
Status: DISCONTINUED | OUTPATIENT
Start: 2020-09-16 | End: 2020-09-16 | Stop reason: HOSPADM

## 2020-09-16 RX ADMIN — PROPOFOL 50 MG: 10 INJECTION, EMULSION INTRAVENOUS at 14:17

## 2020-09-16 RX ADMIN — LIDOCAINE HYDROCHLORIDE 20 MG: 20 INJECTION, SOLUTION EPIDURAL; INFILTRATION; INTRACAUDAL; PERINEURAL at 14:10

## 2020-09-16 RX ADMIN — PROPOFOL 50 MG: 10 INJECTION, EMULSION INTRAVENOUS at 14:24

## 2020-09-16 RX ADMIN — PROPOFOL 50 MG: 10 INJECTION, EMULSION INTRAVENOUS at 14:10

## 2020-09-16 RX ADMIN — SODIUM CHLORIDE, SODIUM LACTATE, POTASSIUM CHLORIDE, AND CALCIUM CHLORIDE 25 ML/HR: 600; 310; 30; 20 INJECTION, SOLUTION INTRAVENOUS at 13:36

## 2020-09-16 NOTE — PERIOP NOTES
Blood sugar by fingerstick is 104, pt given apple juice to drink. VSS  Pt denies any cramping or pain. 1506-D/c instructions reviewed, all questions answered. Reviewed when to call the doctor, when to call 911, diet, activity and hygiene. 1525-Transported via w/c to awaiting transportation. No complaints noted at time of d/c home.

## 2020-09-16 NOTE — ANESTHESIA POSTPROCEDURE EVALUATION
Procedure(s):  COLONOSCOPY  COLON BIOPSY. total IV anesthesia    Anesthesia Post Evaluation      Multimodal analgesia: multimodal analgesia not used between 6 hours prior to anesthesia start to PACU discharge  Patient location during evaluation: PACU  Patient participation: complete - patient participated  Level of consciousness: awake and alert  Pain score: 0  Airway patency: patent  Anesthetic complications: no  Cardiovascular status: acceptable  Respiratory status: acceptable  Hydration status: acceptable  Post anesthesia nausea and vomiting:  none  Final Post Anesthesia Temperature Assessment:  Normothermia (36.0-37.5 degrees C)      INITIAL Post-op Vital signs:   Vitals Value Taken Time   BP     Temp     Pulse 69 9/16/2020  3:08 PM   Resp 15 9/16/2020  3:08 PM   SpO2 100 % 9/16/2020  3:08 PM   Vitals shown include unvalidated device data.

## 2020-09-16 NOTE — H&P
History and Physical    Patient: Frannie Hu MRN: [id number]  SSN: [id number]    YOB: 1955  Age: 76 y.o. Sex: female      Subjective:      Frannie Hu is a 76 y.o. female who presents for colonoscopy. Past Medical History:   Diagnosis Date    Diabetes [facility])      Past Surgical History:   Procedure Laterality Date    HX COLONOSCOPY      HX OOPHORECTOMY Left       Family History   Problem Relation Age of Onset    Alzheimer Mother     No Known Problems Father      Social History     Tobacco Use    Smoking status: Never Smoker    Smokeless tobacco: Never Used   Substance Use Topics    Alcohol use: Never     Frequency: Never      Prior to Admission medications    Medication Sig Start Date End Date Taking? Authorizing Provider   metFORMIN (GLUCOPHAGE) 500 mg tablet Take 2 Tabs by mouth two (2) times daily (with meals) for 90 days. Indications: type 2 diabetes mellitus 9/9/20 12/8/20 Yes Merry Lea MD   glipiZIDE (GLUCOTROL) 5 mg tablet Take 0.5 Tabs by mouth two (2) times a day for 90 days. Indications: type 2 diabetes mellitus 9/9/20 12/8/20 Yes Merry Lea MD   ergocalciferol (ERGOCALCIFEROL) 1,250 mcg (50,000 unit) capsule Take 1 Cap by mouth every seven (7) days for 12 doses. Indications: vitamin D deficiency (high dose therapy) 8/26/20 11/12/20 Yes Merry Lea MD   atorvastatin (LIPITOR) 20 mg tablet Take 1 Tab by mouth daily for 90 days. 8/26/20 11/24/20 Yes Merry Lea MD   insulin glargine (LANTUS,BASAGLAR) 100 unit/mL (3 mL) inpn 10 Units by SubCUTAneous route nightly. Indications: type 2 diabetes mellitus 8/26/20  Yes Merry Lea MD   multivitamin (ONE A DAY) tablet Take 1 Tab by mouth daily.    Yes Provider, Historical   Blood-Glucose Meter monitoring kit As directed 8/26/20   Merry Lea MD   glucose blood VI test strips (blood glucose test) strip Check BSL 3 times a day 8/26/20   Merry Lea MD   lancets misc Test BG three times a day 8/26/20   Lyle Lu Brian Shelton MD   Insulin Needles, Disposable, 31 gauge x 5/16\" ndle Daily use 8/26/20   Scott Wilson MD        No Known Allergies    Review of Systems:  A comprehensive review of systems was negative except for that written in the History of Present Illness. Objective:     Vitals:    09/09/20 1601   Weight: 63.5 kg (140 lb)   Height: 5' 3\" (1.6 m)        Physical Exam:  General:  Alert, cooperative, no distress, appears stated age. Eyes:  Conjunctivae/corneas clear. PERRL, EOMs intact. Fundi benign   Ears:  Normal TMs and external ear canals both ears. Nose: Nares normal. Septum midline. Mucosa normal. No drainage or sinus tenderness. Mouth/Throat: Lips, mucosa, and tongue normal. Teeth and gums normal.   Neck: Supple, symmetrical, trachea midline, no adenopathy, thyroid: no enlargment/tenderness/nodules, no carotid bruit and no JVD. Back:   Symmetric, no curvature. ROM normal. No CVA tenderness. Lungs:   Clear to auscultation bilaterally. Heart:  Regular rate and rhythm, S1, S2 normal, no murmur, click, rub or gallop. Abdomen:   Soft, non-tender. Bowel sounds normal. No masses,  No organomegaly. Extremities: Extremities normal, atraumatic, no cyanosis or edema. Pulses: 2+ and symmetric all extremities. Skin: Skin color, texture, turgor normal. No rashes or lesions   Lymph nodes: Cervical, supraclavicular, and axillary nodes normal.   Neurologic: CNII-XII intact. Normal strength, sensation and reflexes throughout.        Assessment:     Hospital Problems  Date Reviewed: 9/16/2020    None          Plan:     Colonoscopy    Signed By: Lupe Thomas MD     September 16, 2020

## 2020-09-16 NOTE — PERIOP NOTES
Winnie Fee  1955  [id number]    Situation:  Verbal report given from: DIVYA Banuelos CRNA, RN  Procedure: Procedure(s):  COLONOSCOPY  COLON BIOPSY    Background:    Preoperative diagnosis: H/O POLYPS    Postoperative diagnosis: Descending Colon polyp, hemorrhoid    :  Dr. Cande Nickerson    Assistant(s): Circ-1: Marcellus Kelly RN  Circ-2: Timmy Downing RN  Scrub Tech-1: Olga Nuñez    Specimens:   ID Type Source Tests Collected by Time Destination   1 : Descending Colon Biopsy Preservative Colon, Descending  Guille Ayers MD 9/16/2020 0327 Pathology       Assessment:  Intra-procedure medications   Propofol 150 mg      Anesthesia gave intra-procedure sedation and medications, see anesthesia flow sheet     Intravenous fluids: LR@ KVO     Vital signs stable     Abdominal assessment: round and soft nontender      Recommendation:        All side rails up, bed in low position, wheels locked. Nurse at bedside.

## 2020-09-16 NOTE — DISCHARGE INSTRUCTIONS
Adalid Puga  [id number]  1955    COLON DISCHARGE INSTRUCTIONS  Discomfort:  Redness at IV site- apply warm compress to area; if redness or soreness persist- contact your physician  There may be a slight amount of blood passed from the rectum  Gaseous discomfort- walking, belching will help relieve any discomfort  You may not operate a vehicle for 12 hours  You may not engage in an occupation involving machinery or appliances for rest of today  You may not drink alcoholic beverages for at least 12 hours  Avoid making any critical decisions for at least 24 hour  DIET:   Regular diet. - however -  remember your colon is empty and a heavy meal will produce gas. Avoid these foods:  vegetables, fried / greasy foods, carbonated drinks for today       ACTIVITY:  You may resume your normal daily activities it is recommended that you spend the remainder of the day resting -  avoid any strenuous activity. CALL M.D. ANY SIGN OF:   Increasing pain, nausea, vomiting  Abdominal distension (swelling)  New increased bleeding (oral or rectal)  Fever (chills)  Pain in chest area  Bloody discharge from nose or mouth  Shortness of breath     Follow-up Instructions:   Call Lora Henley MD if any questions or problems. Telephone # [phone]  Biopsy results will be available in  7 to10 days  Should have a repeat colonoscopy in 5 years. COLONOSCOPY FINDINGS:  Your colonoscopy showed: 1 polyp (removed) and hemorrhoids. DO NOT TAKE SLEEPING MEDICATIONS OR ANTIANXIETY MEDICATIONS WHILE TAKING NARCOTIC PAIN MEDICATIONS,  ESPECIALLY THE NIGHT OF ANESTHESIA. CPAP PATIENTS BE SURE TO WEAR MACHINE WHENEVER NAPPING OR SLEEPING.     DISCHARGE SUMMARY from Nurse    The following personal items collected during your admission are returned to you:   Dental Appliance: Dental Appliances: Lowers, Uppers, Partials, With patient  Vision: Visual Aid: Glasses(PACU)  Hearing Aid:    Jewelry:    Clothing:    Other Valuables: Valuables sent to safe:        PATIENT INSTRUCTIONS:    Anesthesia Discharge Instructions for Procedural Area requiring Sedation (MAC Anesthesia, Cath Lab, Endo and Radiology): You have been given medications during your procedure that may affect your memory and mental judgement for the next 24 hours. During this time frame for your safety, please follow the instructions listed below :    Have a responsible adult to drive you home and be with you for at least 12 hours.  Rest today and resume normal activities tomorrow.  Start with a soft bland diet and advance as tolerated to your recommended diet.  Do not drive any motor vehicle or operate mechanical or electrical equipment prior to [employer].  Avoid making critical decisions or signing legal documents prior to 6am tomorrow.  Do not drink alcohol prior to 6am tomorrow.  If you have sleep apnea and you plan to go home and take a nap, please use your CPAP machine not only at bedtime, but also while napping for 24 hours.  If you notice any redness or swelling on parts of your body where IV medications were given, place a warm wet washcloth over the area for 20 minutes at a time until the redness or swelling goes away. If you still have redness or swelling after 2-3 days, please call us. · You will receive a Post Operative Call from one of the Recovery Room Nurses on the day after your surgery to check on you. It is very important for us to know how you are recovering after your surgery. If you have an issue or need to speak with someone, please call your surgeon, do not wait for the post operative call. · You may receive an e-mail or letter in the mail from [employer] regarding your experience with us in the Ambulatory Surgery Unit. Your feedback is valuable to us and we appreciate your participation in the survey. · We wish you a speedy recovery ?         What to do at Home:      *  Please give a list of your current medications to your Primary Care Provider. *  Please update this list whenever your medications are discontinued, doses are      changed, or new medications (including over-the-counter products) are added. *  Please carry medication information at all times in case of emergency situations. If you have not received your influenza and/or pneumococcal vaccine, please follow up with your primary care physician. The discharge information has been reviewed with the patient and caregiver. The patient and caregiver verbalized understanding.

## 2020-09-16 NOTE — PERIOP NOTES
Permission received to review discharge instructions and discuss private health information with Aggie Andrew, daughter.

## 2020-09-16 NOTE — PROCEDURES
Colonoscopy Procedure Note    Indications: Previous adenomatous polyp    Anesthesia/Sedation: MAC anesthesia Propofol    Pre-Procedure Exam:  Airway: clear   Heart: normal S1and S2    Lungs: clear bilateral  Abdomen: soft, nontender, bowel sounds present and normal in all quadrants   Mental Status: awake, alert, and oriented to person, place, and time      Procedure in Detail:  Informed consent was obtained for the procedure, including sedation. Risks of perforation, hemorrhage, adverse drug reaction, and aspiration were discussed. The patient was placed in the left lateral decubitus position. Based on the pre-procedure assessment, including review of the patient's medical history, medications, allergies, and review of systems, she had been deemed to be an appropriate candidate for moderate sedation; she was therefore sedated with the medications listed above. The patient was monitored continuously with ECG tracing, pulse oximetry, blood pressure monitoring, and direct observations. A rectal examination was performed. The [id number] was inserted into the rectum and advanced under direct vision to the cecum, which was identified by the ileocecal valve and appendiceal orifice. The quality of the colonic preparation was good. A careful inspection was made as the colonoscope was withdrawn, including a retroflexed view of the rectum; findings and interventions are described below. Appropriate photodocumentation was obtained. Findings:   Rectum:     - Internal/external hemorrhoids  Sigmoid:   Normal  Descending Colon:     - Small semi-pedunculated polyp in the descending colon. Removed and retrieved with hot biopsy forceps  Transverse Colon:   Normal  Ascending Colon:   Normal  Cecum:   Normal          Specimens: Specimens were collected and sent to pathology. EBL: None    Complications: None; patient tolerated the procedure well. Attending Attestation: I performed the procedure.     Recommendations: - Repeat colonoscopy in 5 years.

## 2020-09-16 NOTE — ANESTHESIA PREPROCEDURE EVALUATION
Relevant Problems   No relevant active problems       Anesthetic History   No history of anesthetic complications            Review of Systems / Medical History  Patient summary reviewed, nursing notes reviewed and pertinent labs reviewed    Pulmonary  Within defined limits                 Neuro/Psych   Within defined limits           Cardiovascular              Hyperlipidemia    Exercise tolerance: >4 METS  Comments: 08/20 EKG= ST, VITOR   GI/Hepatic/Renal               Comments: H/o colon polyps Endo/Other    Diabetes (new dx)         Other Findings            Physical Exam    Airway  Mallampati: III  TM Distance: 4 - 6 cm  Neck ROM: normal range of motion   Mouth opening: Normal     Cardiovascular    Rhythm: regular  Rate: normal         Dental    Dentition: Lower partial plate and Upper partial plate     Pulmonary  Breath sounds clear to auscultation               Abdominal  GI exam deferred       Other Findings            Anesthetic Plan    ASA: 2  Anesthesia type: general and total IV anesthesia          Induction: Intravenous  Anesthetic plan and risks discussed with: Patient      preop glucose 122

## 2020-09-17 ENCOUNTER — TELEPHONE (OUTPATIENT)
Dept: DIABETES SERVICES | Age: 65
End: 2020-09-17

## 2020-09-18 ENCOUNTER — VIRTUAL VISIT (OUTPATIENT)
Dept: DIABETES SERVICES | Age: 65
End: 2020-09-18
Payer: MEDICARE

## 2020-09-18 DIAGNOSIS — E11.9 TYPE 2 DIABETES MELLITUS WITHOUT COMPLICATION, UNSPECIFIED WHETHER LONG TERM INSULIN USE (HCC): ICD-10-CM

## 2020-09-18 PROCEDURE — G0108 DIAB MANAGE TRN  PER INDIV: HCPCS

## 2020-09-18 NOTE — PROGRESS NOTES
[facility] Program for Diabetes Health  Diabetes Self-Management Education   Education and Goal Plan for Session #1    AADE7 Self-Care Behaviors:  Behavior Education Completed (9/18/2020)   Healthy Eating   - Balanced plate  - Heart healthy fats     Being Active   - Effects of exercise on blood glucose     Monitoring     - ADA blood glucose targets  - A1c interpretation   Taking Medications - Medication review   Healthy Coping - Obtaining support   Reducing Risks - Prevention of influenza  - Foot care and foot exam  - Dilated eye exam  - Prevention of pneumonia  - Prevention of hepatitis B  - Dental care and dental exam  - Hgb A1c target  - Long-term complications   Problem Solving Not addressed during this session. Note: Content derived from the American Association of Diabetes Educators' Diabetes Education Curriculum: A Guide to Successful Self-Management (2nd edition)         Diabetes Educator Recommendations:     - Continue addressing diabetes self-care behaviors through education and support in AADE7 Curriculum individual sessions on reducing risks, healthy eating, being active, monitoring, taking medications, healthy coping and problem solving.     - Continue practicing knowledge and skills relating to reducing risks to improve diabetes self-management. - Patient's Identified SMART Goal(s): - Patient to determine     - Pt to follow up with provider on the following: none at this time     Diabetes Self-Management Education Follow-up Visit: 1 week    [facility] for Telehealth:    Sonny Davies is a 76 y.o. female being evaluated through a synchronous (real-time) audio-video technology platform, as a substitution for an in-person encounter, to address the healthcare issues mentioned above. I was in the office. The patient was at home. A caregiver was present when appropriate.  The patient and/or her healthcare decision maker, is aware that this patient-initiated, Telehealth encounter on 9/18/2020 is a billable service, with coverage as determined by her insurance carrier. She is aware that she may receive a bill and has provided verbal consent to proceed: Yes. This telehealth encounter occurred during the COVID-19 pandemic and public health emergency. Evaluation of the following organ systems was limited: Vitals/Constitutional/EENT/Resp/CV/GI//MS/Neuro/Skin/Heme-Lymph-Imm. Pursuant to the emergency declaration under the [address] [place] [address] and the [employer] and [employer] Act, this Virtual Visit was conducted with patient's (and/or legal guardian's) consent, to reduce the risk of exposure to COVID-19 and provide necessary medical care. --Gavin Andrade RN on 9/18/2020 at 4:06 PM    An electronic signature was used to authenticate this note.  I was in the office for the appointment and time spent: 66 minutes

## 2020-09-21 ENCOUNTER — TELEPHONE (OUTPATIENT)
Dept: DIABETES SERVICES | Age: 65
End: 2020-09-21

## 2020-09-22 ENCOUNTER — VIRTUAL VISIT (OUTPATIENT)
Dept: DIABETES SERVICES | Age: 65
End: 2020-09-22
Payer: MEDICARE

## 2020-09-22 DIAGNOSIS — E11.9 TYPE 2 DIABETES MELLITUS WITHOUT COMPLICATION, WITHOUT LONG-TERM CURRENT USE OF INSULIN (HCC): ICD-10-CM

## 2020-09-22 PROCEDURE — G0108 DIAB MANAGE TRN  PER INDIV: HCPCS

## 2020-09-22 NOTE — PROGRESS NOTES
[facility] Program for Diabetes Health  Diabetes Self-Management Education   Education and Goal Plan for Session #2A Nutrition    AADE7 Self-Care Behaviors:  Behavior Education Completed (9/22/2020)   Healthy Eating   - Impact of food on blood glucose levels  - Food sources of carbohydrates  - Meal size and portions  - Reading food labels  - Balanced plate  - Meal and snack timing  - Carbohydrate counting   - Importance of a variety of foods  - Heart healthy fats  - Reducing sodium  - Importance of not skipping meals  - Eating breakfast  - Controlling portions of carbohydrates  - Reducing sugar-sweetened beverages  - Comparing food choices  - Alcohol and diabetes  - Meal consistency     Being Active   Not addressed during this session. Monitoring     Not addressed during this session. Taking Medications Not addressed during this session. Healthy Coping Not addressed during this session. Reducing Risks Education delivered in previous session. Problem Solving Not addressed during this session. Note: Content derived from the American Association of Diabetes Educators' Diabetes Education Curriculum: A Guide to Successful Self-Management (2nd edition)         Diabetes Educator Recommendations:     - Continue addressing diabetes self-care behaviors through education and support in AADE7 Curriculum individual sessions on reducing risks, healthy eating, being active, monitoring, taking medications, healthy coping and problem solving.     - Continue practicing knowledge and skills relating to healthy eating to improve diabetes self-management. - Patient's Identified SMART Goal(s): - Practice building a healthy plate for 1 meal a day for 2 weeks.      - Pt to follow up with provider on the following: none at this time     Diabetes Self-Management Education Follow-up Visit: 2 weeks    [facility] Emergency [employer] for Telehealth:    Kian Phoenix is a 76 y.o. female being evaluated through a synchronous (real-time) audio-video technology platform, as a substitution for an in-person encounter, to address the healthcare issues mentioned above. I was in the office. The patient was at home. A caregiver was present when appropriate. The patient and/or her healthcare decision maker, is aware that this patient-initiated, Telehealth encounter on 9/22/2020 is a billable service, with coverage as determined by her insurance carrier. She is aware that she may receive a bill and has provided verbal consent to proceed: Yes. This telehealth encounter occurred during the COVID-19 pandemic and public health emergency. Evaluation of the following organ systems was limited: Vitals/Constitutional/EENT/Resp/CV/GI//MS/Neuro/Skin/Heme-Lymph-Imm. Pursuant to the emergency declaration under the [address] [place] [address], [address] [place] [address] authority and the [employer] and [employer] Act, this Virtual Visit was conducted with patient's (and/or legal guardian's) consent, to reduce the risk of exposure to COVID-19 and provide necessary medical care. --Margarita Alvarez RN on 9/22/2020 at 4:04 PM    An electronic signature was used to authenticate this note.  I was in the office for the appointment and time spent: 64 minutes

## 2020-10-02 DIAGNOSIS — I10 ESSENTIAL HYPERTENSION: ICD-10-CM

## 2020-10-02 RX ORDER — LISINOPRIL 5 MG/1
TABLET ORAL
Qty: 30 TAB | Refills: 0 | Status: SHIPPED | OUTPATIENT
Start: 2020-10-02 | End: 2020-11-18

## 2020-10-05 ENCOUNTER — TELEPHONE (OUTPATIENT)
Dept: DIABETES SERVICES | Age: 65
End: 2020-10-05

## 2020-10-06 ENCOUNTER — VIRTUAL VISIT (OUTPATIENT)
Dept: DIABETES SERVICES | Age: 65
End: 2020-10-06
Payer: MEDICARE

## 2020-10-06 DIAGNOSIS — E11.9 TYPE 2 DIABETES MELLITUS WITHOUT COMPLICATION, UNSPECIFIED WHETHER LONG TERM INSULIN USE (HCC): Primary | ICD-10-CM

## 2020-10-06 PROCEDURE — G0108 DIAB MANAGE TRN  PER INDIV: HCPCS

## 2020-10-06 NOTE — PROGRESS NOTES
[address] [place] [address] for Diabetes Health  Diabetes Self-Management Education   Education and Goal Plan for Session #2B    AADE7 Self-Care Behaviors:  Behavior Education Completed (10/6/2020)   Healthy Eating   - Impact of food on blood glucose levels  - Food sources of carbohydrates  - Balanced plate  - Controlling portions of carbohydrates  - Meal consistency    Encouraged patient to follow ADA guidelines for healthy meals. Being Active   - Effects of exercise on blood glucose    Patient reported physical activity 2days/week for 10-15 minutes/day. Monitoring     - ADA blood glucose targets  - Frequency of monitoring  - Blood glucose schedule  - Monitoring blood glucose trends per meter  - Alternative site testing  - Glucose monitoring technique  - Disposal of used lancets or needles  - Logging blood glucose results     Patient reported BG range: 79-140mg/dL   Taking Medications - Medication review     Patient reported taking all medications as prescribed. Healthy Coping Not addressed during this session. Reducing Risks - Prevention of influenza     Patient encouraged to communicate with provider to obtain influenza vaccine. Problem Solving Not addressed during this session. Note: Content derived from the American Association of Diabetes Educators' Diabetes Education Curriculum: A Guide to Successful Self-Management (2nd edition)         Diabetes Educator Recommendations:     - Continue addressing diabetes self-care behaviors through education and support in AADE7 Curriculum individual sessions on reducing risks, healthy eating, being active, monitoring, taking medications, healthy coping and problem solving.     - Continue practicing knowledge and skills relating to monitoring to improve diabetes self-management. - Pt to follow up with provider on the following:     Patient reported pinkish rash with mild itching to thigh/knee area. Patient reported rubbing and scratching the area.   Patient reported rash was not near area used to inject insulin. No other symptoms of illness or injury reported. Patient reported no fever, no shortness of breath, no cough, no nausea, no vomiting, no change to taste/smell. Patient denied pain to rash area. Encouraged patient to communicate concerns regarding rash to provider. Encouraged patient to keep area clean, dry and do not scratch, or rub affected area. Encouraged patient to communicate to provider to obtain influenza vaccine. Diabetes Self-Management Education Follow-up Visit: 1 week    [place] [address] Emergency Adaptations for Telehealth:    Kimberley Walls is a 76 y.o. female being evaluated through a synchronous (real-time) audio-video technology platform, as a substitution for an in-person encounter, to address the healthcare issues mentioned above. I was in the office. The patient was at home. A caregiver was present when appropriate. The patient and/or her healthcare decision maker, is aware that this patient-initiated, Telehealth encounter on 10/6/2020 is a billable service, with coverage as determined by her insurance carrier. She is aware that she may receive a bill and has provided verbal consent to proceed: Yes. This telehealth encounter occurred during the COVID-19 pandemic and public health emergency. Evaluation of the following organ systems was limited: Vitals/Constitutional/EENT/Resp/CV/GI//MS/Neuro/Skin/Heme-Lymph-Imm. Pursuant to the emergency declaration under the [address] [place] [address], [address] [place] [address] authority and the [employer] and [employer] Act, this Virtual Visit was conducted with patient's (and/or legal guardian's) consent, to reduce the risk of exposure to COVID-19 and provide necessary medical care. --Leighton Casey RN on 10/6/2020 at 2:49 PM    An electronic signature was used to authenticate this note.  I was in the office for the appointment and time spent: 56 minutes

## 2020-10-15 ENCOUNTER — VIRTUAL VISIT (OUTPATIENT)
Dept: DIABETES SERVICES | Age: 65
End: 2020-10-15
Payer: MEDICARE

## 2020-10-15 DIAGNOSIS — E11.9 TYPE 2 DIABETES MELLITUS WITHOUT COMPLICATION, UNSPECIFIED WHETHER LONG TERM INSULIN USE (HCC): Primary | ICD-10-CM

## 2020-10-15 PROCEDURE — G0108 DIAB MANAGE TRN  PER INDIV: HCPCS

## 2020-10-15 NOTE — PROGRESS NOTES
[employer] [facility] Program for Diabetes Health  Diabetes Self-Management Education   Education and Goal Plan for Session #3    AADE7 Self-Care Behaviors:  Behavior Education Completed (10/15/2020)   Healthy Eating   - Impact of food on blood glucose levels  - Food sources of carbohydrates  - Balanced plate  - Controlling portions of carbohydrates  - Meal consistency    Reviewed and Encouraged patient to follow ADA guidelines for healthy plate. Being Active   - Effects of exercise on blood glucose  - Physical activity and insulin  - Goals for exercise  - Types of exercise     Patient reported physical activity: chair exercises and line dancing 15-20 minutes 2 days per week. Monitoring     - Disposal of used lancets or needles  - Logging blood glucose results     Patient reported BG range 79-89 mg/dL. Patient reported a history of hypoglycemia. Patient reported no s/s nor episodes of hypoglycemia this week. Taking Medications - Medication review     Patient reported taking all medications as prescribed. Healthy Coping - Obtaining support   Reducing Risks Education delivered in previous session. Problem Solving - Hypoglycemia signs/symptoms  - Hypoglycemia prevention  - Hypoglycemia treatment: Rule of 15   Note: Content derived from the American Association of Diabetes Educators' Diabetes Education Curriculum: A Guide to Successful Self-Management (2nd edition)         Diabetes Educator Recommendations:     - Continue addressing diabetes self-care behaviors through education and support in AADE7 Curriculum individual sessions on reducing risks, healthy eating, being active, monitoring, taking medications, healthy coping and problem solving.     - Continue practicing knowledge and skills relating to being active and medications to improve diabetes self-management. - Pt to follow up with provider on the following:   Encouraged patient to communicate concerns with history of hypoglycemia to provider.  Patient may benefit from obtaining an emergency glucagon kit. Diabetes Self-Management Education Follow-up Visit: 1 week    [facility] Emergency [facility]ations for Telehealth:    Tyshawn Pinto is a 76 y.o. female being evaluated through a synchronous (real-time) audio-video technology platform, as a substitution for an in-person encounter, to address the healthcare issues mentioned above. I was in the office. The patient was at home. A caregiver was present when appropriate. The patient and/or her healthcare decision maker, is aware that this patient-initiated, Telehealth encounter on 10/15/2020 is a billable service, with coverage as determined by her insurance carrier. She is aware that she may receive a bill and has provided verbal consent to proceed: Yes. This telehealth encounter occurred during the COVID-19 pandemic and public health emergency. Evaluation of the following organ systems was limited: Vitals/Constitutional/EENT/Resp/CV/GI//MS/Neuro/Skin/Heme-Lymph-Imm. Pursuant to the emergency declaration under the [address] [place] [address], [address] [place] [address] authority and the [employer] and [employer] Act, this Virtual Visit was conducted with patient's (and/or legal guardian's) consent, to reduce the risk of exposure to COVID-19 and provide necessary medical care. --Frances Hennessy RN on 10/15/2020 at 3:50 PM    An electronic signature was used to authenticate this note.  I was in the office for the appointment and time spent: 87 minutes

## 2020-10-19 ENCOUNTER — TELEPHONE (OUTPATIENT)
Dept: INTERNAL MEDICINE CLINIC | Age: 65
End: 2020-10-19

## 2020-10-19 NOTE — TELEPHONE ENCOUNTER
Patient called stating she has a rash on her inner thighs, above the knees. Patient would either like something called into her pharmacy to help with this or to be seen. Patient preferred pharmacy is [facility] [address].   CB: [phone]

## 2020-10-20 ENCOUNTER — OFFICE VISIT (OUTPATIENT)
Dept: INTERNAL MEDICINE CLINIC | Age: 65
End: 2020-10-20
Payer: MEDICARE

## 2020-10-20 VITALS
TEMPERATURE: 98.4 F | WEIGHT: 137.2 LBS | OXYGEN SATURATION: 99 % | BODY MASS INDEX: 24.31 KG/M2 | HEART RATE: 88 BPM | RESPIRATION RATE: 14 BRPM | SYSTOLIC BLOOD PRESSURE: 128 MMHG | DIASTOLIC BLOOD PRESSURE: 78 MMHG | HEIGHT: 63 IN

## 2020-10-20 DIAGNOSIS — L23.9 ALLERGIC CONTACT DERMATITIS, UNSPECIFIED TRIGGER: Primary | ICD-10-CM

## 2020-10-20 PROCEDURE — 99212 OFFICE O/P EST SF 10 MIN: CPT | Performed by: NURSE PRACTITIONER

## 2020-10-20 RX ORDER — TRIAMCINOLONE ACETONIDE 1 MG/G
CREAM TOPICAL 2 TIMES DAILY
Qty: 15 G | Refills: 0 | Status: SHIPPED | OUTPATIENT
Start: 2020-10-20 | End: 2020-11-18

## 2020-10-20 NOTE — TELEPHONE ENCOUNTER
MD Robbin Grimm 16 hours ago (3:54 PM)       She can see a nurse practitioner Mr. Nelli Terrazas text        Patient scheduled to see KIRSTEN Whitlock at 11:30AM today (10/20/2020).

## 2020-10-20 NOTE — PROGRESS NOTES
HIPAA verified by two patient identifiers. Adalgisa Antonio is a 76 y.o. female    Chief Complaint   Patient presents with    Rash     inner thigh       Visit Vitals  /78 (BP 1 Location: Left arm, BP Patient Position: Sitting)   Pulse 88   Temp 98.4 °F (36.9 °C) (Temporal)   Resp 14   Ht 5' 3\" (1.6 m)   Wt 137 lb 3.2 oz (62.2 kg)   SpO2 99%   BMI 24.30 kg/m²       Pain Scale: 0 - No pain/10  Pain Location:       Health Maintenance Due   Topic Date Due    Hepatitis C Screening  1955    Eye Exam Retinal or Dilated  08/04/1964    DTaP/Tdap/Td series (1 - Tdap) 08/04/1975    Shingrix Vaccine Age 50> (1 of 2) 08/04/2004    Breast Cancer Screen Mammogram  08/04/2004    FOBT Q1Y Age 50-75  08/04/2004    GLAUCOMA SCREENING Q2Y  08/04/2019    Pneumococcal 65+ years (1 of 1 - PPSV23) 08/04/2019    Flu Vaccine (1) 09/01/2020         Coordination of Care Questionnaire:  :   1) Have you been to an emergency room, urgent care, or hospitalized since your last visit? If yes, where when, and reason for visit? no       2. Have seen or consulted any other health care provider since your last visit? If yes, where when, and reason for visit? NO      Patient is accompanied by self I have received verbal consent from Adalgisa Antonio to discuss any/all medical information while they are present in the room.

## 2020-10-20 NOTE — PROGRESS NOTES
Subjective:     No chief complaint on file. History of present illness:  Adalgisa Antonio is a 76 y.o. female who complains of rash on bilateral inner lower legs, onset approximately 4 days ago, but has been progressively and slowly getting better. She admits to possible contact with something that irritated her skin. She has not used anything over-the-counter to help with her symptoms. She states it is not painful, and did not blister. There is only mild redness to the area.      Past Medical History:   Diagnosis Date    Diabetes ([facility] [address]       No Known Allergies   Family History   Problem Relation Age of Onset    Alzheimer Mother     No Known Problems Father       Social History     Socioeconomic History    Marital status:      Spouse name: Not on file    Number of children: Not on file    Years of education: Not on file    Highest education level: Not on file   Occupational History    Not on file   Social Needs    Financial resource strain: Not on file    Food insecurity     Worry: Not on file     Inability: Not on file    Transportation needs     Medical: Not on file     Non-medical: Not on file   Tobacco Use    Smoking status: Never Smoker    Smokeless tobacco: Never Used   Substance and Sexual Activity    Alcohol use: Never     Frequency: Never    Drug use: Never    Sexual activity: Not Currently   Lifestyle    Physical activity     Days per week: Not on file     Minutes per session: Not on file    Stress: Not on file   Relationships    Social connections     Talks on phone: Not on file     Gets together: Not on file     Attends Latter day service: Not on file     Active member of club or organization: Not on file     Attends meetings of clubs or organizations: Not on file     Relationship status: Not on file    Intimate partner violence     Fear of current or ex partner: Not on file     Emotionally abused: Not on file     Physically abused: Not on file     Forced sexual activity: Not on file   Other Topics Concern    Not on file   Social History Narrative    Not on file     Prior to Admission medications    Medication Sig Start Date End Date Taking? Authorizing Provider   lisinopriL (PRINIVIL, ZESTRIL) 5 mg tablet TAKE 1 TABLET BY MOUTH ONCE DAILY FOR HIGH BLOOD PRESSURE FOR 30 DAYS 10/2/20   Joy Sutton MD   metFORMIN (GLUCOPHAGE) 500 mg tablet Take 2 Tabs by mouth two (2) times daily (with meals) for 90 days. Indications: type 2 diabetes mellitus 9/9/20 12/8/20  Joy Sutton MD   glipiZIDE (GLUCOTROL) 5 mg tablet Take 0.5 Tabs by mouth two (2) times a day for 90 days. Indications: type 2 diabetes mellitus 9/9/20 12/8/20  Joy Sutton MD   ergocalciferol (ERGOCALCIFEROL) 1,250 mcg (50,000 unit) capsule Take 1 Cap by mouth every seven (7) days for 12 doses. Indications: vitamin D deficiency (high dose therapy) 8/26/20 11/12/20  Joy Sutton MD   Blood-Glucose Meter monitoring kit As directed 8/26/20   Joy Sutton MD   glucose blood VI test strips (blood glucose test) strip Check BSL 3 times a day 8/26/20   Joy Sutton MD   lancets misc Test BG three times a day 8/26/20   Joy Sutton MD   atorvastatin (LIPITOR) 20 mg tablet Take 1 Tab by mouth daily for 90 days. 8/26/20 11/24/20  Joy Sutton MD   insulin glargine (LANTUS,BASAGLAR) 100 unit/mL (3 mL) inpn 10 Units by SubCUTAneous route nightly. Indications: type 2 diabetes mellitus 8/26/20   Joy Sutton MD   Insulin Needles, Disposable, 31 gauge x 5/16\" ndle Daily use 8/26/20   oJy Sutton MD   multivitamin (ONE A DAY) tablet Take 1 Tab by mouth daily. Provider, Historical        Review of Systems              Constitutional:  She denies fever, weight loss, sweats or fatigue. Respiratory:  No cough, wheezing or shortness of breath. No sputum production. Cardiac:  Denies chest pain, palpitations, unexplained indigestion, syncope, edema, PND or orthopnea.     Extremities:  No joint pain, stiffness or swelling  Skin: Rash to inner aspect of bilateral lower extremities. No weeping or open areas. Lymphatic: No lymph node enlargement    Objective: There were no vitals filed for this visit. There is no height or weight on file to calculate BMI. Physical Examination:              General Appearance:  Well-developed, well-nourished, no acute distress. Neck:  Supple without thyromegaly or adenopathy. Lungs:  Clear to auscultation and percussion X5. No abnormal breath sounds. Cardiac:  Regular rate and rhythm without murmur. PMI not displaced. No gallop, rub or click. Skin: Broad slightly annular rash to bilateral inner knees and lower extremities. Slight redness noted. No raised areas. Hematologic:   No purpura or petechiae        Assessment/Plan:       No diagnosis found. Impressions/Plan:    Patient has likely contact dermatitis. I will place her on Kenalog cream 0.1% to use 2-3 times a day until cleared. Patient to cleanse area with mild soapy water and pat dry, followed by cream use. Patient educated on use. Patient states understanding and agrees with plan. Candi Sanchez NP    The patient was given after the visit summary the patient verbalized an understanding of the plans and problems as explained.

## 2020-10-23 ENCOUNTER — VIRTUAL VISIT (OUTPATIENT)
Dept: DIABETES SERVICES | Age: 65
End: 2020-10-23
Payer: MEDICARE

## 2020-10-23 DIAGNOSIS — E11.9 CONTROLLED TYPE 2 DIABETES MELLITUS WITHOUT COMPLICATION, UNSPECIFIED WHETHER LONG TERM INSULIN USE (HCC): ICD-10-CM

## 2020-10-23 PROCEDURE — G0108 DIAB MANAGE TRN  PER INDIV: HCPCS

## 2020-10-23 NOTE — PROGRESS NOTES
[employer]  Diabetes Self-Management Education   Education and Goal Plan for Session #4    AADE7 Self-Care Behaviors:  Behavior Education Completed (10/23/2020)   Healthy Eating   - Impact of food on blood glucose levels  - Balanced plate  - Reducing sugar-sweetened beverages      Patient reported following ADA guidelines for most meals most days. Discussed strategies for holiday meal planning. Being Active   Education delivered in previous session. Patient reported physical activity: line dancing and chair exercise 2-3 times per week. Monitoring     - ADA blood glucose targets  - Monitoring blood glucose trends per meter      Patient reported monitoring BG 2 times daily. Patient reported BG range  mg/dL. Patient denied any recent episodes of hypoglycemia. Taking Medications - Medication review     Patient reported taking all medications as prescribed. Healthy Coping - Emotions in response to diagnosis  - Coping skills  - Stress management  - Obtaining support   Reducing Risks Education delivered in previous session. Problem Solving - Hypoglycemia signs/symptoms  - Hypoglycemia prevention  - Hypoglycemia treatment: Rule of 15  - Hyperglycemia signs/symptoms  - Hyperglycemia prevention  - Sick day management  - Emergency preparedness      Note: Content derived from the American Association of Diabetes Educators' Diabetes Education Curriculum: A Guide to Successful Self-Management (2nd edition)         Diabetes Educator Recommendations:     - Continue addressing diabetes self-care behaviors through education and support in AADE7 Curriculum individual sessions on reducing risks, healthy eating, being active, monitoring, taking medications, healthy coping and problem solving.     - Continue practicing knowledge and skills relating to healthy coping and problem solving to improve diabetes self-management.      - Pt to follow up with provider on the following: none at this time     Diabetes Self-Management Education Follow-up Visit: 6 weeks    [facility] for Telehealth:    Krishna Delacruz is a 76 y.o. female being evaluated through a synchronous (real-time) audio-video technology platform, as a substitution for an in-person encounter, to address the healthcare issues mentioned above. I was in the office. The patient was at home. A caregiver was present when appropriate. The patient and/or her healthcare decision maker, is aware that this patient-initiated, Telehealth encounter on 10/23/2020 is a billable service, with coverage as determined by her insurance carrier. She is aware that she may receive a bill and has provided verbal consent to proceed: Yes. This telehealth encounter occurred during the COVID-19 pandemic and public health emergency. Evaluation of the following organ systems was limited: Vitals/Constitutional/EENT/Resp/CV/GI//MS/Neuro/Skin/Heme-Lymph-Imm. Pursuant to the emergency declaration under the [address] [place] [address], [address] [place] [address] authority and the byUs and [employer] Act, this Virtual Visit was conducted with patient's (and/or legal guardian's) consent, to reduce the risk of exposure to COVID-19 and provide necessary medical care. --Leticia Figueroa RN on 10/23/2020 at 11:30 AM    An electronic signature was used to authenticate this note.  I was in the office for the appointment and time spent: 77 minutes

## 2020-10-27 DIAGNOSIS — E11.9 NEW ONSET TYPE 2 DIABETES MELLITUS (HCC): ICD-10-CM

## 2020-10-27 RX ORDER — LANCETS
EACH MISCELLANEOUS
Qty: 102 EACH | Refills: 0 | Status: SHIPPED | OUTPATIENT
Start: 2020-10-27 | End: 2022-02-11

## 2020-10-27 RX ORDER — BLOOD SUGAR DIAGNOSTIC
STRIP MISCELLANEOUS
Qty: 100 STRIP | Refills: 0 | Status: SHIPPED | OUTPATIENT
Start: 2020-10-27 | End: 2022-02-11

## 2020-11-17 NOTE — PROGRESS NOTES
Malik Batres is a 76 y.o. female and presents with Diabetes (follow up)      Subjective:  Patient comes in today for 3-month follow-up. Type 2 diabetes-insulin-dependent. Currently on 10 units of Lantus at night. Remains on glipizide and metformin. Denies any side effects from the medication. Her fasting blood sugars have been ranging around 86. She denies hypoglycemic events. Denies numbness or tingling sensation in upper or lower extremity. She has been actively participating in all diabetic education classes. Her sugar control is much better. She is taking also modifications very seriously. Hypertension-labile. At goal.  Currently off antihypertensive. We started a small dose of lisinopril however she became hypotensive and we had to discontinue it. Hyperlipidemia-on statin. Patient had colonoscopy done on 9/16/2020. Descending colon showed a small semipedunculated polyp in descending colon. S/p polypectomy. Repeat in 5 years. Procedure performed by Dr. Evlyn Cheadle. Recap-Patient comes in today to establish care. She is a new patient to our practice. She is never had a PCP in the past.  She lives with her 3 daughters  [facility]. 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 One of her daughter works in [employer]. The second daughter is mentally challenged and she takes care of her. She has a past medical history of type 2 diabetes and was on metformin in the past.  She reports she ran out of the medications and was not financially capable of setting up doctor visits since she was uninsured at that point. She does not check her blood sugars regularly. She does not have a glucose meter. She denies hypoglycemic episodes, polyuria, polydipsia, numbness or tingling sensation in upper or lower extremity. Sinus tachycardia on presentation. Which again predates her mild anxiety of coming to the doctor's office.   Denies any racing of heart, chest pain or anginal symptoms. EKG was done in office which was personally reviewed by me and showed sinus tachycardia, heart rate 112. Past Medical History:   Diagnosis Date    Diabetes [facility])      Past Surgical History:   Procedure Laterality Date    COLONOSCOPY N/A 9/16/2020    COLONOSCOPY performed by Yanelis Eubanks [facility] at [facility] AM[facility]ULAT[facility]    HX COLONOSCOPY      HX OOPHORECTOMY Left      No Known Allergies  Current Outpatient Medications   Medication Sig Dispense Refill    insulin glargine (LANTUS,BASAGLAR) 100 unit/mL (3 mL) inpn 10 Units by SubCUTAneous route nightly. Indications: type 2 diabetes mellitus 1 Adjustable Dose Pre-filled Pen Syringe 3    Accu-Chek Fastclix Lancet Drum misc USE   TO CHECK GLUCOSE THREE TIMES DAILY 102 Each 0    Accu-Chek Guide test strips strip USE 1 STRIP TO CHECK GLUCOSE THREE TIMES DAILY 100 Strip 0    metFORMIN (GLUCOPHAGE) 500 mg tablet Take 2 Tabs by mouth two (2) times daily (with meals) for 90 days. Indications: type 2 diabetes mellitus 360 Tab 1    glipiZIDE (GLUCOTROL) 5 mg tablet Take 0.5 Tabs by mouth two (2) times a day for 90 days. Indications: type 2 diabetes mellitus 90 Tab 0    Blood-Glucose Meter monitoring kit As directed 1 Kit 0    atorvastatin (LIPITOR) 20 mg tablet Take 1 Tab by mouth daily for 90 days. 90 Tab 0    Insulin Needles, Disposable, 31 gauge x 5/16\" ndle Daily use 1 Package 11    multivitamin (ONE A DAY) tablet Take 1 Tab by mouth daily.        Social History     Socioeconomic History    Marital status:      Spouse name: Not on file    Number of children: Not on file    Years of education: Not on file    Highest education level: Not on file   Tobacco Use    Smoking status: Never Smoker    Smokeless tobacco: Never Used   Substance and Sexual Activity    Alcohol use: Never     Frequency: Never    Drug use: Never    Sexual activity: Not Currently     Family History   Problem Relation Age of Onset  Alzheimer Mother     No Known Problems Father        Review of Systems  ROS is unremarkable except for ones highlighted in bold.    Constitutional: negative for fevers, chills, anorexia and weight loss  Eyes:   negative for visual disturbance and irritation  ENT:   negative for tinnitus,sore throat,nasal congestion,ear pain,hoarseness  Respiratory:  negative for cough, hemoptysis, dyspnea,wheezing  CV:   negative for chest pain, palpitations, lower extremity edema  GI:   negative for nausea, vomiting, diarrhea, abdominal pain,melena  Endo:               negative for polyuria,polydipsia,polyphagia,heat intolerance  Genitourinary: negative for frequency, dysuria and hematuria  Integumentary: negative for rash and pruritus  Hematologic:  negative for easy bruising and gum/nose bleeding  Musculoskel: negative for myalgias, arthralgias, back pain, muscle weakness, joint pain  Neurological:  negative for headaches, dizziness, vertigo, memory problems and gait   Behavl/Psych: negative for feelings of anxiety, depression, mood changes  ROS otherwise negative     Objective:  Visit Vitals  /70 (BP 1 Location: Left arm, BP Patient Position: Sitting)   Pulse 84   Temp 97.8 °F (36.6 °C)   Resp 14   Ht 5' 3\" (1.6 m)   Wt 138 lb (62.6 kg)   BMI 24.45 kg/m²     Physical Exam:   General appearance - alert, well appearing, and in no distress  Mental status - alert, oriented to person, place, and time  EYE-SHAINA, EOMI, fundi normal, corneas normal, no foreign bodies  ENT-ENT exam normal, no neck nodes or sinus tenderness  Nose - normal and patent, no erythema, discharge or polyps  Mouth - mucous membranes moist, pharynx normal without lesions  Neck - supple, no significant adenopathy   Chest - clear to auscultation, no wheezes, rales or rhonchi, symmetric air entry   Heart - normal rate, regular rhythm, normal S1, S2, no murmurs, rubs, clicks or gallops   Abdomen - soft, nontender, nondistended, no masses or organomegaly  Lymph- no adenopathy palpable  Ext-peripheral pulses normal, no pedal edema, no clubbing or cyanosis  Skin-Warm and dry. no hyperpigmentation, vitiligo, or suspicious lesions  Neuro -alert, oriented, normal speech, no focal findings or movement disorder noted  Musculoskeletal- FROM, no bony abnormalities, no point tenderness    Lab Review:  Results for orders placed or performed during the hospital encounter of 09/16/20   GLUCOSE, POC   Result Value Ref Range    Glucose (POC) 122 (H) 65 - 100 mg/dL    Performed by Hardik Frausto RN    GLUCOSE, POC   Result Value Ref Range    Glucose (POC) 104 (H) 65 - 100 mg/dL    Performed by Jody Hutson RN         Documenation Review:    Assessment/Plan:    Diagnoses and all orders for this visit:    1. Essential hypertension  -     CBC W/O DIFF  -     METABOLIC PANEL, COMPREHENSIVE    2. Mixed hyperlipidemia  -     LIPID PANEL    3. Encounter for drug therapy    4. Type 2 diabetes mellitus with hyperglycemia, with long-term current use of insulin (HCC)  -     HEMOGLOBIN A1C W/O EAG    5. Hx of colonic polyp    6. New onset type 2 diabetes mellitus (HCC)  -     insulin glargine (LANTUS,BASAGLAR) 100 unit/mL (3 mL) inpn; 10 Units by SubCUTAneous route nightly. Indications: type 2 diabetes mellitus      Discussed with patient. She refuses to take any vaccine. She is declined flu, pneumonia, shingles, DTaP. Continue current meds   I will call with lab results and make further recommendations or adjustments if necessary. Discussed lifestyle modifications including Na restriction, low carb/fat diet, weight reduction and exercise (at least a walking program). ICD-10-CM ICD-9-CM    1. Essential hypertension  I10 401.9 CBC W/O DIFF      METABOLIC PANEL, COMPREHENSIVE   2. Mixed hyperlipidemia  E78.2 272.2 LIPID PANEL   3. Encounter for drug therapy  Z79.899 V58.69    4.  Type 2 diabetes mellitus with hyperglycemia, with long-term current use of insulin (HCC)  E11.65 250.00 HEMOGLOBIN A1C W/O EAG    Z79.4 790.29      V58.67    5. Hx of colonic polyp  Z86.010 V12.72    6. New onset type 2 diabetes mellitus (HCC)  E11.9 250.00 insulin glargine (LANTUS,BASAGLAR) 100 unit/mL (3 mL) inpn             I have reviewed with the patient details of the assessment and plan and all questions were answered. Relevent patient education was performed. Verbal and/or written instructions (see AVS) provided. The most recent lab findings were reviewed with the patient. Plan was discussed with patient who verbally expressed understanding. An After Visit Summary was printed and given to the patient.     Camila English MD

## 2020-11-18 ENCOUNTER — OFFICE VISIT (OUTPATIENT)
Dept: INTERNAL MEDICINE CLINIC | Age: 65
End: 2020-11-18
Payer: MEDICARE

## 2020-11-18 VITALS
WEIGHT: 138 LBS | HEIGHT: 63 IN | BODY MASS INDEX: 24.45 KG/M2 | HEART RATE: 84 BPM | RESPIRATION RATE: 14 BRPM | SYSTOLIC BLOOD PRESSURE: 132 MMHG | DIASTOLIC BLOOD PRESSURE: 70 MMHG | TEMPERATURE: 97.8 F

## 2020-11-18 DIAGNOSIS — Z79.4 TYPE 2 DIABETES MELLITUS WITH HYPERGLYCEMIA, WITH LONG-TERM CURRENT USE OF INSULIN (HCC): ICD-10-CM

## 2020-11-18 DIAGNOSIS — E11.9 NEW ONSET TYPE 2 DIABETES MELLITUS (HCC): ICD-10-CM

## 2020-11-18 DIAGNOSIS — Z86.010 HX OF COLONIC POLYP: ICD-10-CM

## 2020-11-18 DIAGNOSIS — I10 ESSENTIAL HYPERTENSION: Primary | ICD-10-CM

## 2020-11-18 DIAGNOSIS — E78.2 MIXED HYPERLIPIDEMIA: ICD-10-CM

## 2020-11-18 DIAGNOSIS — Z79.899 ENCOUNTER FOR DRUG THERAPY: ICD-10-CM

## 2020-11-18 DIAGNOSIS — E11.65 TYPE 2 DIABETES MELLITUS WITH HYPERGLYCEMIA, WITH LONG-TERM CURRENT USE OF INSULIN (HCC): ICD-10-CM

## 2020-11-18 LAB
A-G RATIO,AGRAT: 1.1 RATIO
ALBUMIN SERPL-MCNC: 3.9 G/DL (ref 3.9–5.4)
ALP SERPL-CCNC: 107 U/L (ref 38–126)
ALT SERPL-CCNC: 17 U/L (ref 0–35)
ANION GAP SERPL CALC-SCNC: 7 MMOL/L
AST SERPL W P-5'-P-CCNC: 26 U/L (ref 14–36)
BILIRUB SERPL-MCNC: 0.7 MG/DL (ref 0.2–1.3)
BUN SERPL-MCNC: 15 MG/DL (ref 7–17)
BUN/CREATININE RATIO,BUCR: 25 RATIO
CALCIUM SERPL-MCNC: 9.5 MG/DL (ref 8.4–10.2)
CHLORIDE SERPL-SCNC: 105 MMOL/L (ref 98–107)
CHOL/HDL RATIO,CHHD: 4 RATIO (ref 0–4)
CHOLEST SERPL-MCNC: 145 MG/DL (ref 0–200)
CO2 SERPL-SCNC: 31 MMOL/L (ref 22–32)
CREAT SERPL-MCNC: 0.6 MG/DL (ref 0.7–1.2)
ERYTHROCYTE [DISTWIDTH] IN BLOOD BY AUTOMATED COUNT: 13.8 %
GLOBULIN,GLOB: 3.4
GLUCOSE SERPL-MCNC: 100 MG/DL (ref 65–105)
HBA1C MFR BLD HPLC: 6.4 % (ref 4–5.7)
HCT VFR BLD AUTO: 32.1 % (ref 37–51)
HDLC SERPL-MCNC: 39 MG/DL (ref 35–130)
HGB BLD-MCNC: 10.2 G/DL (ref 12–18)
LDL/HDL RATIO,LDHD: 2 RATIO
LDLC SERPL CALC-MCNC: 88 MG/DL (ref 0–130)
MCH RBC QN AUTO: 27.3 PG (ref 26–32)
MCHC RBC AUTO-ENTMCNC: 31.8 G/DL (ref 30–36)
MCV RBC AUTO: 86 FL (ref 80–97)
PLATELET # BLD AUTO: 219 K/UL (ref 140–440)
POTASSIUM SERPL-SCNC: 4.1 MMOL/L (ref 3.6–5)
PROT SERPL-MCNC: 7.3 G/DL (ref 6.3–8.2)
RBC # BLD AUTO: 3.73 M/UL (ref 4.2–6.3)
SODIUM SERPL-SCNC: 143 MMOL/L (ref 137–145)
TRIGL SERPL-MCNC: 91 MG/DL (ref 0–200)
VLDLC SERPL CALC-MCNC: 18 MG/DL
WBC # BLD AUTO: 5.1 K/UL (ref 4.1–10.9)

## 2020-11-18 PROCEDURE — 1101F PT FALLS ASSESS-DOCD LE1/YR: CPT | Performed by: INTERNAL MEDICINE

## 2020-11-18 PROCEDURE — 83036 HEMOGLOBIN GLYCOSYLATED A1C: CPT | Performed by: INTERNAL MEDICINE

## 2020-11-18 PROCEDURE — 3046F HEMOGLOBIN A1C LEVEL >9.0%: CPT | Performed by: INTERNAL MEDICINE

## 2020-11-18 PROCEDURE — G8427 DOCREV CUR MEDS BY ELIG CLIN: HCPCS | Performed by: INTERNAL MEDICINE

## 2020-11-18 PROCEDURE — G8432 DEP SCR NOT DOC, RNG: HCPCS | Performed by: INTERNAL MEDICINE

## 2020-11-18 PROCEDURE — G8420 CALC BMI NORM PARAMETERS: HCPCS | Performed by: INTERNAL MEDICINE

## 2020-11-18 PROCEDURE — G8536 NO DOC ELDER MAL SCRN: HCPCS | Performed by: INTERNAL MEDICINE

## 2020-11-18 PROCEDURE — G8400 PT W/DXA NO RESULTS DOC: HCPCS | Performed by: INTERNAL MEDICINE

## 2020-11-18 PROCEDURE — 3017F COLORECTAL CA SCREEN DOC REV: CPT | Performed by: INTERNAL MEDICINE

## 2020-11-18 PROCEDURE — 80061 LIPID PANEL: CPT | Performed by: INTERNAL MEDICINE

## 2020-11-18 PROCEDURE — G9899 SCRN MAM PERF RSLTS DOC: HCPCS | Performed by: INTERNAL MEDICINE

## 2020-11-18 PROCEDURE — 85027 COMPLETE CBC AUTOMATED: CPT | Performed by: INTERNAL MEDICINE

## 2020-11-18 PROCEDURE — 99214 OFFICE O/P EST MOD 30 MIN: CPT | Performed by: INTERNAL MEDICINE

## 2020-11-18 PROCEDURE — 2022F DILAT RTA XM EVC RTNOPTHY: CPT | Performed by: INTERNAL MEDICINE

## 2020-11-18 PROCEDURE — 1090F PRES/ABSN URINE INCON ASSESS: CPT | Performed by: INTERNAL MEDICINE

## 2020-11-18 PROCEDURE — 80053 COMPREHEN METABOLIC PANEL: CPT | Performed by: INTERNAL MEDICINE

## 2020-11-18 RX ORDER — INSULIN GLARGINE 100 [IU]/ML
10 INJECTION, SOLUTION SUBCUTANEOUS
Qty: 1 ADJUSTABLE DOSE PRE-FILLED PEN SYRINGE | Refills: 3 | Status: SHIPPED | OUTPATIENT
Start: 2020-11-18 | End: 2022-04-14 | Stop reason: SDUPTHER

## 2020-11-18 NOTE — PATIENT INSTRUCTIONS
Learning About Type 2 Diabetes  What is type 2 diabetes? Insulin is a hormone that helps your body use sugar from your food as energy. Type 2 diabetes happens when your body can't use insulin the right way. Over time, the pancreas can't make enough insulin. If you don't have enough insulin, too much sugar stays in your blood. If you are overweight, get little or no exercise, or have type 2 diabetes in your family, you are more likely to have problems with the way insulin works in your body.  Americans, Hispanics, Native Americans,  Americans, and Pacific Islanders have a higher risk for type 2 diabetes. Type 2 diabetes can be prevented or delayed with a healthy lifestyle, which includes staying at a healthy weight, making smart food choices, and getting regular exercise. What can you expect with type 2 diabetes? Marya Sandoval keep hearing about how important it is to keep your blood sugar within a target range. That's because over time, high blood sugar can lead to serious problems. It can:  · Harm your eyes, nerves, and kidneys. · Damage your blood vessels, leading to heart disease and stroke. · Reduce blood flow and cause nerve damage to parts of your body, especially your feet. This can cause slow healing and pain when you walk. · Make your immune system weak and less able to fight infections. When people hear the word \"diabetes,\" they often think of problems like these. But daily care and treatment can help prevent or delay these problems. The goal is to keep your blood sugar in a target range. That's the best way to reduce your chance of having more problems from diabetes. What are the symptoms? Some people who have type 2 diabetes may not have any symptoms early on. Many people with the disease don't even know they have it at first. But with time, diabetes starts to cause symptoms. You experience most symptoms of type 2 diabetes when your blood sugar is either too high or too low.   The most common symptoms of high blood sugar include:  · Thirst.  · Frequent urination. · Weight loss. · Blurry vision. The symptoms of low blood sugar include:  · Sweating. · Shakiness. · Weakness. · Hunger. · Confusion. How can you prevent type 2 diabetes? The best way to prevent or delay type 2 diabetes is to adopt healthy habits, which include:  · Staying at a healthy weight. · Exercising regularly. · Eating healthy foods. How is type 2 diabetes treated? If you have type 2 diabetes, here are the most important things you can do. · Take your diabetes medicines. · Check your blood sugar as often as your doctor recommends. Also, get a hemoglobin A1c test at least every 6 months. · Try to eat a variety of foods and to spread carbohydrate throughout the day. Carbohydrate raises blood sugar higher and more quickly than any other nutrient does. Carbohydrate is found in sugar, breads and cereals, fruit, starchy vegetables such as potatoes and corn, and milk and yogurt. · Get at least 30 minutes of exercise on most days of the week. Walking is a good choice. You also may want to do other activities, such as running, swimming, cycling, or playing tennis or team sports. If your doctor says it's okay, do muscle-strengthening exercises at least 2 times a week. · See your doctor for checkups and tests on a regular schedule. · If you have high blood pressure or high cholesterol, take the medicines as prescribed by your doctor. · Do not smoke. Smoking can make health problems worse. This includes problems you might have with type 2 diabetes. If you need help quitting, talk to your doctor about stop-smoking programs and medicines. These can increase your chances of quitting for good. Follow-up care is a key part of your treatment and safety. Be sure to make and go to all appointments, and call your doctor if you are having problems.  It's also a good idea to know your test results and keep a list of the medicines you take. Where can you learn more? Go to http://www.gray.com/  Enter [id number] in the search box to learn more about \"Learning About Type 2 Diabetes. \"  Current as of: December 20, 2019               Content Version: 12.6  © 1844-3920 [employer]. Care instructions adapted under license by [employer] (which disclaims liability or warranty for this information). If you have questions about a medical condition or this instruction, always ask your healthcare professional. [address] any warranty or liability for your use of this information.

## 2020-11-18 NOTE — PROGRESS NOTES
Colby Baeza is a 76 y.o. female presenting for Diabetes (follow up)  . 1. Have you been to the ER, urgent care clinic since your last visit? Hospitalized since your last visit? No    2. Have you seen or consulted any other health care providers outside of the [address] [place] [address] since your last visit? Include any pap smears or colon screening. No    Fall Risk Assessment, last 12 mths 10/20/2020   Able to walk? Yes   Fall in past 12 months? No         Abuse Screening Questionnaire 8/12/2020   Do you ever feel afraid of your partner? N   Are you in a relationship with someone who physically or mentally threatens you? N   Is it safe for you to go home? Y       3 most recent PHQ Screens 10/20/2020   Little interest or pleasure in doing things Not at all   Feeling down, depressed, irritable, or hopeless Not at all   Total Score PHQ 2 0       There are no discontinued medications.

## 2020-11-27 DIAGNOSIS — E78.2 MIXED HYPERLIPIDEMIA: ICD-10-CM

## 2020-11-27 DIAGNOSIS — E11.9 NEW ONSET TYPE 2 DIABETES MELLITUS (HCC): ICD-10-CM

## 2020-11-27 NOTE — TELEPHONE ENCOUNTER
Last Refill: 9/9/2020  Last Visit: 11/18/2020   Next Visit: none    Requested Prescriptions     Pending Prescriptions Disp Refills    glipiZIDE (GLUCOTROL) 5 mg tablet 90 Tab 0     Sig: Take 0.5 Tabs by mouth two (2) times a day for 90 days.  Indications: type 2 diabetes mellitus

## 2020-11-27 NOTE — TELEPHONE ENCOUNTER
Last Refill: 8/26/2020  Last Visit: 11/18/2020   Next Visit: none    Requested Prescriptions     Pending Prescriptions Disp Refills    atorvastatin (LIPITOR) 20 mg tablet 90 Tab 0     Sig: Take 1 Tab by mouth daily for 90 days.

## 2020-11-29 RX ORDER — ATORVASTATIN CALCIUM 20 MG/1
20 TABLET, FILM COATED ORAL DAILY
Qty: 90 TAB | Refills: 0 | Status: SHIPPED | OUTPATIENT
Start: 2020-11-29 | End: 2021-02-27

## 2020-11-29 RX ORDER — GLIPIZIDE 5 MG/1
2.5 TABLET ORAL 2 TIMES DAILY
Qty: 90 TAB | Refills: 0 | Status: SHIPPED | OUTPATIENT
Start: 2020-11-29 | End: 2021-03-29 | Stop reason: SDUPTHER

## 2020-11-29 RX ORDER — ATORVASTATIN CALCIUM 20 MG/1
TABLET, FILM COATED ORAL
Qty: 90 TAB | Refills: 0 | Status: SHIPPED | OUTPATIENT
Start: 2020-11-29 | End: 2021-09-09 | Stop reason: SDUPTHER

## 2020-12-04 ENCOUNTER — VIRTUAL VISIT (OUTPATIENT)
Dept: DIABETES SERVICES | Age: 65
End: 2020-12-04
Payer: MEDICARE

## 2020-12-04 DIAGNOSIS — E11.9 CONTROLLED TYPE 2 DIABETES MELLITUS WITHOUT COMPLICATION, WITHOUT LONG-TERM CURRENT USE OF INSULIN (HCC): Primary | ICD-10-CM

## 2020-12-04 PROCEDURE — G0108 DIAB MANAGE TRN  PER INDIV: HCPCS

## 2020-12-04 NOTE — PROGRESS NOTES
[facility] Program for Diabetes Health  Diabetes Self-Management Education  Post-program Evaluation    Tyshawn Pinto completed diabetes self-management education using the AADE7 Curriculum. Patient's SMART goals and behavior change:  [x]Healthy Eating: Behavior improved  [x]Being Active: Behavior improved  [x]Monitoring: Behavior improved  [x]Taking Medications: Behavior improved  [x]Healthy Coping: Behavior improved  [x]Reducing Risks: Behavior improved  [x]Problem Solving: Behavior improved    Evaluation of Patient's Progress:  Metric Patient's responses (12/4/2020) Comparison to Pre-program   A1c  (Goal: 7%)   Recent value:   Lab Results   Component Value Date/Time    Hemoglobin A1c 6.4 (H) 11/18/2020 10:36 AM        Improved A1c     Hemoglobin A1c 11.1 08/12/2020   Healthy Eating     24-hour Dietary Recall:  Brunch: oatmeal, 1 slice toast with butter  Dinner: rottessire chicken, cabbage, cornbread  Snacks: apple  Beverages: water  Alcohol: none    Stage of change: Action     Improved in choosing balanced meals Improved consistency of meals Improved consistency of CHO at meals Improved in decreasing sugar-sweetened beverages Improved in choosing heart-healthy fats Improved in choosing lower sodium foods     Reviewed ADA guidelines for healthy plate. Reviewed reading food labels. Encouraged patient to follow ADA guidelines for healthy plate. Encouraged patient to add non-starchy vegetables to lunch and dinner. Encouraged patient to add protein to meals. Encouraged patient to add 2-3 servings of dairy daily. Encouraged patient to  communicate nutritional concerns to provider.    Being Active     Physical Activity Vital Sign:  How many days during the past week have you performed physical activity where your heart beats faster and your breathing is harder than normal for 30 minutes or more?  0 days    How many days in a typical week do you perform activity such as this?  0 days    Stage of change: Action Improved frequency of physical activity     Patient reported physical activity 15-20 minutes most days. Monitoring Do you monitor your blood sugar? Yes    How often do you monitor? 2x/day    What are the range of readings?  mg/dL    Do you know your last A1c measurement? Yes    Do you know the meaning of the A1c? Yes    Stage of change: Maintenance    Fewer high readings  Improved time in range  Monitoring more frequently     Encouraged patient to communicate BG results with provider. Taking Medications Medication Management:  Do you understand what your diabetes medications do? Yes    Can you afford your diabetes medications? Yes    How often do you miss doses of your diabetes medications? Never    Current dosing:   Key Antihyperglycemic Medications             glipiZIDE (GLUCOTROL) 5 mg tablet Take 0.5 Tabs by mouth two (2) times a day for 90 days. Indications: type 2 diabetes mellitus    insulin glargine (LANTUS,BASAGLAR) 100 unit/mL (3 mL) inpn 10 Units by SubCUTAneous route nightly. Indications: type 2 diabetes mellitus    metFORMIN (GLUCOPHAGE) 500 mg tablet Take 2 Tabs by mouth two (2) times daily (with meals) for 90 days. Indications: type 2 diabetes mellitus          Blood Pressure Management:  Key ACE/ARB Medications     Patient is on no ACE or ARB meds. Lipid Management:  Key Antihyperlipidemia Meds             atorvastatin (LIPITOR) 20 mg tablet Take 1 Tab by mouth daily for 90 days. atorvastatin (LIPITOR) 20 mg tablet Take 1 tablet by mouth once daily for 90 days          Clot Prevention:  Key Anti-Platelet Anticoagulant Meds     The patient is on no antiplatelet meds or anticoagulants. Stage of change: Maintenance    Taking all medications as recommended   Healthy Coping Diabetes Skills, Confidence and Preparedness Index:   Total score: 5.8  Skills: 5.2  Confidence: 5.9  Preparedness: 6.4    Stage of change: Maintenance    Improved total score  Improved skills score  Improved confidence score  Improved preparedness score   Reducing Risks Vaccines:  Influenza: There is no immunization history on file for this patient. Pneumococcal:   There is no immunization history on file for this patient. Hepatitis:   There is no immunization history on file for this patient. Examinations:  Diabetic Foot and Eye Exam HM Status   Topic Date Due    Eye Exam  08/04/1965    Diabetic Foot Care  08/12/2021        Dental exam: DUE    Foot exam: Patient reported completed 11/2020    Heart Protection:  BP Readings from Last 2 Encounters:   11/18/20 132/70   10/20/20 128/78        Lab Results   Component Value Date/Time    LDL, calculated 88 11/18/2020 10:35 AM        Kidney Protection:  No results found for: MCACR, MCA1, MCA2, MCA3, MCAU, MCAU2, MCALPOCT    Patient-reported diabetes complications:  none    Stage of change: Maintenance  Reported no new reported diabetes complications     Patient declined influenza immunization. Patient reported provider aware of refusal at this time. Problem Solving Hypoglycemia Management:  What are signs and symptoms of hypoglycemia that you experience? Weakness    How do you prevent hypoglycemia? Consistent meals/snack times and Take medications as instructed    How do you treat hypoglycemia? Rule of 15    Hyperglycemia Management:  What are signs and symptoms of hyperglycemia that you experience? Pt reported being unaware of s/s of hyperglycemia    How can you prevent hyperglycemia? Take medication as instructed, Focus on carbohydrate counting/meal planning, Engage in regular physically active    Sick Day Management:  What do you do differently on sick days? Stay hydrated, Check blood glucose every 2-4 hours, Take diabetes medication as instructed by provider    Pattern Management:  Do you notice blood glucose patterns when you look at the readings in your meter or logbook?  No    How do you use the blood glucose readings from your meter or logbook? Pt reported being unaware of pattern management skills    Stage of change: Action  Improved understanding of hyperglycemia management  Improved understanding of hypoglycemia management  Improved understanding of sick day management     Encouraged patient to record BG results to improve pattern management skills. Note: Content derived from the American Association of Diabetes Educators' Diabetes Education Curriculum: A Guide to Successful Self-Management (2nd edition)       Diabetes Educator Recommendations:     - Patient may benefit from follow-up diabetes education sessions to develop goals and plans on healthy eating and monitoring, being active and medications and healthy coping and problem solving. Patient is currently in the Maintenance stage of change. Discussed with patient follow-up appointment: patient or family to call with questions/concerns or to schedule future appointment as needed. Sarah [facility] Emergency Adaptations for Telehealth:    Adalgisa Antonio is a 76 y.o. female being evaluated through a synchronous (real-time) audio-video technology platform, as a substitution for an in-person encounter, to address the healthcare issues mentioned above. A caregiver was present when appropriate. I was in the office. The patient was at home. The patient and/or her healthcare decision maker, is aware that this patient-initiated, Telehealth encounter on 12/4/2020 is a billable service, with coverage as determined by her insurance carrier. She is aware that she may receive a bill and has provided verbal consent to proceed: Yes. This telehealth encounter occurred during the COVID-19 pandemic and public health emergency. Evaluation of the following organ systems was limited: Vitals/Constitutional/EENT/Resp/CV/GI//MS/Neuro/Skin/Heme-Lymph-Imm.   Pursuant to the emergency declaration under the [facility], 1135 waiver authority and the Coronavirus Preparedness and Response Supplemental Appropriations Act, this Virtual Visit was conducted with patient's (and/or legal guardian's) consent, to reduce the risk of exposure to COVID-19 and provide necessary medical care. --eVnu Walters RN on 12/4/2020 at 2:38 PM    An electronic signature was used to authenticate this note. I was in the office for the appointment and time spent: 68 minutes  Diabetes Skills, Confidence & Preparedness Index (SCPI) ©  Thank you for completing the Skills, Confidence & Preparedness Index! Below are your scores. All scales and questions are out of 7. If you would like these results emailed, please enter your email address along with some identifying patient information. Email: Patient Identifier:    Overall SCPI score: 5.8 Skills Score: 5.2  Low: Blood Sugar Monitoring(Q4),Blood Sugar Monitoring(Q8) Confidence Score: 5.9  Low: Blood Sugar Monitoring(Q6) Preparedness Score: 6.4  Low: Taking Medication(Q5)   Healthy Eating Score: 6.3  Low: Skills(Q1),Confidence(Q1),Confidence(Q4) Taking Medication Score: 4.5  Low: Preparedness(Q5) Blood Sugar Monitoring Score: 5.0  Low: Skills(Q4),Skills(Q8) Reducing Risks Score: 6.0  Low: Skills(Q5),Skills(Q9),Confidence(Q3),Preparedness(Q4)   Problem Solving Score: 6.0  Low: Skills(Q6) Healthy Coping Score: 6.3  Low: Skills(Q7),Confidence(Q2) Being Active Score: 6.5  Low: Confidence(Q5)   Skills/Knowledge Questions   1. I know how to plan meals that have the best balance between carbohydrates, proteins and vegetables. 6   2. I know how my diabetes medications (pills, injectables and/or insulin) work in my body. 5   3. I know when to check my blood sugar if I want to see how my body responded to a meal. 5   4. I know when to check my blood sugars to determine if my medication or insulin doses are correct. 4   5. I know what to do to prevent a low blood sugar when I exercise (either before, during, or after). 6   6.  When I am sick, I know what to do differently with my diabetes management. 5   7. I know how stress can affect my diabetes management. 6   8. When I look at my blood sugars over a given week, I can explain what my blood sugar pattern is. 4   9. I know what my target levels are for A1c, blood pressure and cholesterol. 6   Confidence Questions   1. I am confident that I can plan balanced meals and snacks. 6   2. I am confident that I can manage my stress. 6   3. I am confident that I can prevent a low blood sugar during or after exercise. 6   4. I am confident that the next time I eat out, I will be able to choose foods that best keep my blood sugars in target. 6   5. I am confident I can include exercise into my schedule. 6   6. I am confident that I can use my daily blood sugars to adjust my diet, my activity, and/or my insulin. 5   7. When something out of my normal routine happens, I am confident that I can problem-solve and keep my diabetes on track. 6   Preparedness Questions   1. Within the next month, I will begin to eat more balanced meals and snacks. 8   2. Within the next month, I will choose an exercise activity and I will start fitting it into my schedule. 8   3. Within the next month, I will make a list of stress management options that work for me. 8   4. Within the next month, I will consistently plan ahead to prevent low blood sugars. 6   5. Within the next month, I will start adjusting my insulin doses on my own. 4   6. Within the next month, I will begin making changes to my diabetes management based on my daily blood sugars (eg - eating, activity and/or insulin). 8   7. Within the next month, I will begin making changes to my diabetes management to meet my overall goals (eg - eating, activity and/or insulin).

## 2021-01-29 ENCOUNTER — TELEPHONE (OUTPATIENT)
Dept: INTERNAL MEDICINE CLINIC | Age: 66
End: 2021-01-29

## 2021-01-29 RX ORDER — CLOTRIMAZOLE AND BETAMETHASONE DIPROPIONATE 10; .64 MG/G; MG/G
CREAM TOPICAL 2 TIMES DAILY
Qty: 15 G | Refills: 0 | Status: SHIPPED | OUTPATIENT
Start: 2021-01-29 | End: 2021-06-28

## 2021-01-29 NOTE — TELEPHONE ENCOUNTER
Alfredia Jeans, MD  You 1 hour ago (3:05 PM)     Sent Rx for clotrimazole-beclamethasone  cream.        notified

## 2021-01-29 NOTE — TELEPHONE ENCOUNTER
Patient called stating she saw Thorne Pod NP for a rash and now the rash has come back.  She wants to know if Dr. Jenaro Gomes will give her a refill on the cream or give her a different cream.  CB: [phone]

## 2021-03-25 ENCOUNTER — TELEPHONE (OUTPATIENT)
Dept: INTERNAL MEDICINE CLINIC | Age: 66
End: 2021-03-25

## 2021-03-25 NOTE — TELEPHONE ENCOUNTER
Patient called wanting to know if she can get the wearable dexcom 6 to check her sugar.   CB: [phone]

## 2021-03-25 NOTE — TELEPHONE ENCOUNTER
MD Chaka Nielsen 18 minutes ago (3:34 PM)     Patient is overdue for her 3-month follow-up. Please schedule follow-up visit for patient. Alejo Babb to check most recent labs. Bertha Lo discuss during office visit.     Message text      Patient scheduled for Monday at 9:45am.

## 2021-03-28 NOTE — PROGRESS NOTES
Juan Orta is a 76 y.o. female and presents with Medication Evaluation (follow up)      Subjective:  Patient comes in today for 3-month follow-up. Type 2 diabetesinsulin-dependent. Last A1c improved to 6.4. Currently on 10 units of Lantus at night. Remains on glipizide and metformin. Denies any side effects from the medication. Her fasting blood sugars have been ranging around 3. She denies hypoglycemic events. Denies numbness or tingling sensation in upper or lower extremity. She has been actively participating in all diabetic education classes. Her sugar control is much better. She is taking also modifications very seriously. Hypertensionlabile. At goal.  Currently off antihypertensive. We started a small dose of lisinopril however she became hypotensive and we had to discontinue it. Hyperlipidemiaon statin. Patient had colonoscopy done on 9/16/2020. Descending colon showed a small semipedunculated polyp in descending colon. S/p polypectomy. Repeat in 5 years. Procedure performed by Dr. Yves Aldridge. Recap-Patient comes in today to establish care. She is a new patient to our practice. She is never had a PCP in the past.  She lives with her 3 daughters  [facility]. 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 One of her daughter works in [employer]. The second daughter is mentally challenged and she takes care of her. She has a past medical history of type 2 diabetes and was on metformin in the past.  She reports she ran out of the medications and was not financially capable of setting up doctor visits since she was uninsured at that point. She does not check her blood sugars regularly. She does not have a glucose meter. She denies hypoglycemic episodes, polyuria, polydipsia, numbness or tingling sensation in upper or lower extremity. Sinus tachycardia on presentation.   Which again predates her mild anxiety of coming to the doctor's office. Denies any racing of heart, chest pain or anginal symptoms. EKG was done in office which was personally reviewed by me and showed sinus tachycardia, heart rate 112. Past Medical History:   Diagnosis Date    Diabetes [facility])      Past Surgical History:   Procedure Laterality Date    COLONOSCOPY N/A 9/16/2020    COLONOSCOPY performed by [facility]urch [facility]    HX COLONOSCOPY      HX OOPHORECTOMY Left      No Known Allergies  Current Outpatient Medications   Medication Sig Dispense Refill    metFORMIN (GLUCOPHAGE) 500 mg tablet Take 2 Tabs by mouth two (2) times daily (with meals) for 90 days. Indications: type 2 diabetes mellitus 360 Tab 1    glipiZIDE (GLUCOTROL) 5 mg tablet Take 0.5 Tabs by mouth two (2) times a day for 90 days. Indications: type 2 diabetes mellitus 90 Tab 0    clotrimazole-betamethasone (LOTRISONE) topical cream Apply  to affected area two (2) times a day. 15 g 0    atorvastatin (LIPITOR) 20 mg tablet Take 1 tablet by mouth once daily for 90 days 90 Tab 0    insulin glargine (LANTUS,BASAGLAR) 100 unit/mL (3 mL) inpn 10 Units by SubCUTAneous route nightly. Indications: type 2 diabetes mellitus 1 Adjustable Dose Pre-filled Pen Syringe 3    Accu-Chek Fastclix Lancet Drum misc USE   TO CHECK GLUCOSE THREE TIMES DAILY 102 Each 0    Accu-Chek Guide test strips strip USE 1 STRIP TO CHECK GLUCOSE THREE TIMES DAILY 100 Strip 0    Blood-Glucose Meter monitoring kit As directed 1 Kit 0    Insulin Needles, Disposable, 31 gauge x 5/16\" ndle Daily use 1 Package 11    multivitamin (ONE A DAY) tablet Take 1 Tab by mouth daily.        Social History     Socioeconomic History    Marital status:      Spouse name: Not on file    Number of children: Not on file    Years of education: Not on file    Highest education level: Not on file   Tobacco Use    Smoking status: Never Smoker    Smokeless tobacco: Never Used   Substance and Sexual Activity    Alcohol use: Never     Frequency: Never    Drug use: Never    Sexual activity: Not Currently     Family History   Problem Relation Age of Onset    Alzheimer Mother     No Known Problems Father        Review of Systems  ROS is unremarkable except for ones highlighted in bold.    Constitutional: negative for fevers, chills, anorexia and weight loss  Eyes:   negative for visual disturbance and irritation  ENT:   negative for tinnitus,sore throat,nasal congestion,ear pain,hoarseness  Respiratory:  negative for cough, hemoptysis, dyspnea,wheezing  CV:   negative for chest pain, palpitations, lower extremity edema  GI:   negative for nausea, vomiting, diarrhea, abdominal pain,melena  Endo:               negative for polyuria,polydipsia,polyphagia,heat intolerance  Genitourinary: negative for frequency, dysuria and hematuria  Integumentary: negative for rash and pruritus  Hematologic:  negative for easy bruising and gum/nose bleeding  Musculoskel: negative for myalgias, arthralgias, back pain, muscle weakness, joint pain  Neurological:  negative for headaches, dizziness, vertigo, memory problems and gait   Behavl/Psych: negative for feelings of anxiety, depression, mood changes  ROS otherwise negative     Objective:  Visit Vitals  /76 (BP 1 Location: Left upper arm, BP Patient Position: Sitting, BP Cuff Size: Adult)   Pulse 73   Temp 97.1 °F (36.2 °C)   Resp 14   Ht 5' 3\" (1.6 m)   Wt 143 lb (64.9 kg)   SpO2 98%   BMI 25.33 kg/m²     Physical Exam:   General appearance - alert, well appearing, and in no distress  Mental status - alert, oriented to person, place, and time  EYE-SHAINA, EOMI, fundi normal, corneas normal, no foreign bodies  ENT-ENT exam normal, no neck nodes or sinus tenderness  Nose - normal and patent, no erythema, discharge or polyps  Mouth - mucous membranes moist, pharynx normal without lesions  Neck - supple, no significant adenopathy   Chest - clear to auscultation, no wheezes, rales or rhonchi, symmetric air entry   Heart - normal rate, regular rhythm, normal S1, S2, no murmurs, rubs, clicks or gallops   Abdomen - soft, nontender, nondistended, no masses or organomegaly  Lymph- no adenopathy palpable  Ext-peripheral pulses normal, no pedal edema, no clubbing or cyanosis  Skin-Warm and dry. no hyperpigmentation, vitiligo, or suspicious lesions  Neuro -alert, oriented, normal speech, no focal findings or movement disorder noted  Musculoskeletal- FROM, no bony abnormalities, no point tenderness    Lab Review:  Results for orders placed or performed in visit on 11/18/20   CBC W/O DIFF   Result Value Ref Range    WBC 5.1 4.1 - 10.9 K/uL    RBC 3.73 (L) 4.20 - 6.30 M/uL    HGB 10.2 (L) 12.0 - 18.0 g/dL    HCT 32.1 (L) 37.0 - 51.0 %    MCH 27.3 26.0 - 32.0 pg    MCHC 31.8 30.0 - 36.0 g/dL    MCV 86.0 80.0 - 97.0 fL    RDW 13.8 %    PLATELET 285.1 549.4 - 440.0 K/uL   LIPID PANEL   Result Value Ref Range    Cholesterol, total 145 0 - 200 mg/dL    Triglyceride 91 0 - 200 mg/dL    HDL Cholesterol 39 35 - 130 mg/dL    VLDL 18 mg/dL    LDL, calculated 88 0 - 130 mg/dL    CHOL/HDL Ratio 4 0 - 4 Ratio    LDL/HDL Ratio 2 Ratio   METABOLIC PANEL, COMPREHENSIVE   Result Value Ref Range    Glucose 100 65 - 105 mg/dL    BUN 15.0 7.0 - 17.0 mg/dL    Creatinine 0.6 (L) 0.7 - 1.2 mg/dL    Sodium 143 137 - 145 mmol/L    Potassium 4.1 3.6 - 5.0 mmol/L    Chloride 105 98 - 107 mmol/L    CO2 31.0 22.0 - 32.0 mmol/L    Calcium 9.5 8.4 - 10.2 mg/dl    Protein, total 7.3 6.3 - 8.2 g/dL    Albumin 3.9 3.9 - 5.4 g/dL    ALT (SGPT) 17 0 - 35 U/L    AST (SGOT) 26.0 14.0 - 36.0 U/L    Alk.  phosphatase 107 38 - 126 U/L    Bilirubin, total 0.7 0.2 - 1.3 mg/dL    BUN/Creatinine ratio 25 Ratio    GFR est AA >60 mL/min/1.73m2    GFR est non-AA >60 mL/min/1.73m2    Globulin 3.40     A-G Ratio 1.1 Ratio    Anion gap 7 mmol/L   HEMOGLOBIN A1C W/O EAG   Result Value Ref Range    Hemoglobin A1c 6.4 (H) 4.0 - 5.7 % Documenation Review:    Assessment/Plan:    Diagnoses and all orders for this visit:    1. Type 2 diabetes mellitus with hyperglycemia, with long-term current use of insulin (HCC)  -     HEMOGLOBIN A1C W/O EAG    2. Essential hypertension  -     METABOLIC PANEL, COMPREHENSIVE  -     CBC W/O DIFF; Future    3. Mixed hyperlipidemia  -     LIPID PANEL    4. New onset type 2 diabetes mellitus (HCC)  -     metFORMIN (GLUCOPHAGE) 500 mg tablet; Take 2 Tabs by mouth two (2) times daily (with meals) for 90 days. Indications: type 2 diabetes mellitus  -     glipiZIDE (GLUCOTROL) 5 mg tablet; Take 0.5 Tabs by mouth two (2) times a day for 90 days. Indications: type 2 diabetes mellitus        Continue current meds   I will call with lab results and make further recommendations or adjustments if necessary. Discussed lifestyle modifications including Na restriction, low carb/fat diet, weight reduction and exercise (at least a walking program). ICD-10-CM ICD-9-CM    1. Type 2 diabetes mellitus with hyperglycemia, with long-term current use of insulin (HCC)  E11.65 250.00 HEMOGLOBIN A1C W/O EAG    Z79.4 790.29      V58.67    2. Essential hypertension  D22 351.0 METABOLIC PANEL, COMPREHENSIVE      CBC W/O DIFF   3. Mixed hyperlipidemia  E78.2 272.2 LIPID PANEL   4. New onset type 2 diabetes mellitus (HCC)  E11.9 250.00 metFORMIN (GLUCOPHAGE) 500 mg tablet      glipiZIDE (GLUCOTROL) 5 mg tablet             I have reviewed with the patient details of the assessment and plan and all questions were answered. Relevent patient education was performed. Verbal and/or written instructions (see AVS) provided. The most recent lab findings were reviewed with the patient. Plan was discussed with patient who verbally expressed understanding. An After Visit Summary was printed and given to the patient.     Felicia Rain MD

## 2021-03-29 ENCOUNTER — OFFICE VISIT (OUTPATIENT)
Dept: INTERNAL MEDICINE CLINIC | Age: 66
End: 2021-03-29
Payer: MEDICARE

## 2021-03-29 VITALS
HEART RATE: 73 BPM | HEIGHT: 63 IN | WEIGHT: 143 LBS | SYSTOLIC BLOOD PRESSURE: 130 MMHG | RESPIRATION RATE: 14 BRPM | BODY MASS INDEX: 25.34 KG/M2 | OXYGEN SATURATION: 98 % | DIASTOLIC BLOOD PRESSURE: 76 MMHG | TEMPERATURE: 97.1 F

## 2021-03-29 DIAGNOSIS — E11.9 NEW ONSET TYPE 2 DIABETES MELLITUS (HCC): ICD-10-CM

## 2021-03-29 DIAGNOSIS — E11.65 TYPE 2 DIABETES MELLITUS WITH HYPERGLYCEMIA, WITH LONG-TERM CURRENT USE OF INSULIN (HCC): Primary | ICD-10-CM

## 2021-03-29 DIAGNOSIS — I10 ESSENTIAL HYPERTENSION: ICD-10-CM

## 2021-03-29 DIAGNOSIS — Z79.4 TYPE 2 DIABETES MELLITUS WITH HYPERGLYCEMIA, WITH LONG-TERM CURRENT USE OF INSULIN (HCC): Primary | ICD-10-CM

## 2021-03-29 DIAGNOSIS — E78.2 MIXED HYPERLIPIDEMIA: ICD-10-CM

## 2021-03-29 LAB
A-G RATIO,AGRAT: 1.2 RATIO
ALBUMIN SERPL-MCNC: 3.9 G/DL (ref 3.9–5.4)
ALP SERPL-CCNC: 129 U/L (ref 38–126)
ALT SERPL-CCNC: 25 U/L (ref 0–35)
ANION GAP SERPL CALC-SCNC: 9 MMOL/L
AST SERPL W P-5'-P-CCNC: 29 U/L (ref 14–36)
BILIRUB SERPL-MCNC: 0.5 MG/DL (ref 0.2–1.3)
BUN SERPL-MCNC: 14 MG/DL (ref 7–17)
BUN/CREATININE RATIO,BUCR: 23 RATIO
CALCIUM SERPL-MCNC: 9.8 MG/DL (ref 8.4–10.2)
CHLORIDE SERPL-SCNC: 101 MMOL/L (ref 98–107)
CHOL/HDL RATIO,CHHD: 4 RATIO (ref 0–4)
CHOLEST SERPL-MCNC: 201 MG/DL (ref 0–200)
CO2 SERPL-SCNC: 33 MMOL/L (ref 22–32)
CREAT SERPL-MCNC: 0.6 MG/DL (ref 0.7–1.2)
ERYTHROCYTE [DISTWIDTH] IN BLOOD BY AUTOMATED COUNT: 13.3 % (ref 11.5–14.5)
GLOBULIN,GLOB: 3.3
GLUCOSE SERPL-MCNC: 151 MG/DL (ref 65–105)
HBA1C MFR BLD HPLC: 7.4 % (ref 4–5.7)
HCT VFR BLD AUTO: 33 % (ref 35–47)
HDLC SERPL-MCNC: 47 MG/DL (ref 35–130)
HGB BLD-MCNC: 10.5 G/DL (ref 11.5–16)
LDL/HDL RATIO,LDHD: 3 RATIO
LDLC SERPL CALC-MCNC: 131 MG/DL (ref 0–130)
MCH RBC QN AUTO: 27.3 PG (ref 26–34)
MCHC RBC AUTO-ENTMCNC: 31.8 G/DL (ref 30–36.5)
MCV RBC AUTO: 85.7 FL (ref 80–99)
NRBC # BLD: 0 K/UL (ref 0–0.01)
NRBC BLD-RTO: 0 PER 100 WBC
PLATELET # BLD AUTO: 197 K/UL (ref 150–400)
PMV BLD AUTO: 11.4 FL (ref 8.9–12.9)
POTASSIUM SERPL-SCNC: 4 MMOL/L (ref 3.6–5)
PROT SERPL-MCNC: 7.2 G/DL (ref 6.3–8.2)
RBC # BLD AUTO: 3.85 M/UL (ref 3.8–5.2)
SODIUM SERPL-SCNC: 143 MMOL/L (ref 137–145)
TRIGL SERPL-MCNC: 115 MG/DL (ref 0–200)
VLDLC SERPL CALC-MCNC: 23 MG/DL
WBC # BLD AUTO: 4.7 K/UL (ref 3.6–11)

## 2021-03-29 PROCEDURE — 99214 OFFICE O/P EST MOD 30 MIN: CPT | Performed by: INTERNAL MEDICINE

## 2021-03-29 PROCEDURE — G8400 PT W/DXA NO RESULTS DOC: HCPCS | Performed by: INTERNAL MEDICINE

## 2021-03-29 PROCEDURE — G8754 DIAS BP LESS 90: HCPCS | Performed by: INTERNAL MEDICINE

## 2021-03-29 PROCEDURE — 1101F PT FALLS ASSESS-DOCD LE1/YR: CPT | Performed by: INTERNAL MEDICINE

## 2021-03-29 PROCEDURE — 80061 LIPID PANEL: CPT | Performed by: INTERNAL MEDICINE

## 2021-03-29 PROCEDURE — G8536 NO DOC ELDER MAL SCRN: HCPCS | Performed by: INTERNAL MEDICINE

## 2021-03-29 PROCEDURE — 80053 COMPREHEN METABOLIC PANEL: CPT | Performed by: INTERNAL MEDICINE

## 2021-03-29 PROCEDURE — 3046F HEMOGLOBIN A1C LEVEL >9.0%: CPT | Performed by: INTERNAL MEDICINE

## 2021-03-29 PROCEDURE — G8510 SCR DEP NEG, NO PLAN REQD: HCPCS | Performed by: INTERNAL MEDICINE

## 2021-03-29 PROCEDURE — 83036 HEMOGLOBIN GLYCOSYLATED A1C: CPT | Performed by: INTERNAL MEDICINE

## 2021-03-29 PROCEDURE — 1090F PRES/ABSN URINE INCON ASSESS: CPT | Performed by: INTERNAL MEDICINE

## 2021-03-29 PROCEDURE — 3017F COLORECTAL CA SCREEN DOC REV: CPT | Performed by: INTERNAL MEDICINE

## 2021-03-29 PROCEDURE — G8752 SYS BP LESS 140: HCPCS | Performed by: INTERNAL MEDICINE

## 2021-03-29 PROCEDURE — G9899 SCRN MAM PERF RSLTS DOC: HCPCS | Performed by: INTERNAL MEDICINE

## 2021-03-29 PROCEDURE — G8419 CALC BMI OUT NRM PARAM NOF/U: HCPCS | Performed by: INTERNAL MEDICINE

## 2021-03-29 PROCEDURE — G8427 DOCREV CUR MEDS BY ELIG CLIN: HCPCS | Performed by: INTERNAL MEDICINE

## 2021-03-29 PROCEDURE — 2022F DILAT RTA XM EVC RTNOPTHY: CPT | Performed by: INTERNAL MEDICINE

## 2021-03-29 RX ORDER — METFORMIN HYDROCHLORIDE 500 MG/1
1000 TABLET ORAL 2 TIMES DAILY WITH MEALS
Qty: 360 TAB | Refills: 1 | Status: SHIPPED | OUTPATIENT
Start: 2021-03-29 | End: 2021-09-09

## 2021-03-29 RX ORDER — GLIPIZIDE 5 MG/1
2.5 TABLET ORAL 2 TIMES DAILY
Qty: 90 TAB | Refills: 0 | Status: SHIPPED | OUTPATIENT
Start: 2021-03-29 | End: 2021-09-09

## 2021-03-29 NOTE — PATIENT INSTRUCTIONS
Learning About Type 2 Diabetes What is type 2 diabetes? Insulin is a hormone that helps your body use sugar from your food as energy. Type 2 diabetes happens when your body can't use insulin the right way. Over time, the pancreas can't make enough insulin. If you don't have enough insulin, too much sugar stays in your blood. If you are overweight, get little or no exercise, or have type 2 diabetes in your family, you are more likely to have problems with the way insulin works in your body.  Americans, Hispanics, Native Americans,  Americans, and Pacific Islanders have a higher risk for type 2 diabetes. Type 2 diabetes can be prevented or delayed with a healthy lifestyle, which includes staying at a healthy weight, making smart food choices, and getting regular exercise. What can you expect with type 2 diabetes? Carlos Murry keep hearing about how important it is to keep your blood sugar within a target range. That's because over time, high blood sugar can lead to serious problems. It can: 
· Harm your eyes, nerves, and kidneys. · Damage your blood vessels, leading to heart disease and stroke. · Reduce blood flow and cause nerve damage to parts of your body, especially your feet. This can cause slow healing and pain when you walk. · Make your immune system weak and less able to fight infections. When people hear the word \"diabetes,\" they often think of problems like these. But daily care and treatment can help prevent or delay these problems. The goal is to keep your blood sugar in a target range. That's the best way to reduce your chance of having more problems from diabetes. What are the symptoms? Some people who have type 2 diabetes may not have any symptoms early on. Many people with the disease don't even know they have it at first. But with time, diabetes starts to cause symptoms. You experience most symptoms of type 2 diabetes when your blood sugar is either too high or too low.  
The most common symptoms of high blood sugar include: 
· Thirst. 
· Frequent urination. 
· Weight loss. 
· Blurry vision. 
The symptoms of low blood sugar include: 
· Sweating. 
· Shakiness. 
· Weakness. 
· Hunger. 
· Confusion. 
How can you prevent type 2 diabetes? 
The best way to prevent or delay type 2 diabetes is to adopt healthy habits, which include: 
· Staying at a healthy weight. 
· Exercising regularly. 
· Eating healthy foods. 
How is type 2 diabetes treated? 
If you have type 2 diabetes, here are the most important things you can do. 
· Take your diabetes medicines. 
· Check your blood sugar as often as your doctor recommends. Also, get a hemoglobin A1c test at least every 6 months. 
· Try to eat a variety of foods and to spread carbohydrate throughout the day. Carbohydrate raises blood sugar higher and more quickly than any other nutrient does. Carbohydrate is found in sugar, breads and cereals, fruit, starchy vegetables such as potatoes and corn, and milk and yogurt. 
· Get at least 30 minutes of exercise on most days of the week. Walking is a good choice. You also may want to do other activities, such as running, swimming, cycling, or playing tennis or team sports. If your doctor says it's okay, do muscle-strengthening exercises at least 2 times a week. 
· See your doctor for checkups and tests on a regular schedule. 
· If you have high blood pressure or high cholesterol, take the medicines as prescribed by your doctor. 
· Do not smoke. Smoking can make health problems worse. This includes problems you might have with type 2 diabetes. If you need help quitting, talk to your doctor about stop-smoking programs and medicines. These can increase your chances of quitting for good. 
Follow-up care is a key part of your treatment and safety. Be sure to make and go to all appointments, and call your doctor if you are having problems. It's also a good idea to know your test results and keep a list of the medicines  you take. Where can you learn more? Go to http://www.gray.com/ Enter H130 in the search box to learn more about \"Learning About Type 2 Diabetes. \" Current as of: December 20, 2019               Content Version: 12.6 © 1872-4500 [employer]. Care instructions adapted under license by [employer] (which disclaims liability or warranty for this information). If you have questions about a medical condition or this instruction, always ask your healthcare professional. [address] any warranty or liability for your use of this information.

## 2021-03-29 NOTE — PROGRESS NOTES
Ally Smiley is a 76 y.o. female presenting for Medication Evaluation (follow up)  . 1. Have you been to the ER, urgent care clinic since your last visit? Hospitalized since your last visit? No    2. Have you seen or consulted any other health care providers outside of the [address] [place] [address] since your last visit? Include any pap smears or colon screening. No    Fall Risk Assessment, last 12 mths 3/29/2021   Able to walk? Yes   Fall in past 12 months? 0   Do you feel unsteady? 0   Are you worried about falling 0         Abuse Screening Questionnaire 3/29/2021   Do you ever feel afraid of your partner? N   Are you in a relationship with someone who physically or mentally threatens you? N   Is it safe for you to go home? Y       3 most recent PHQ Screens 3/29/2021   Little interest or pleasure in doing things Not at all   Feeling down, depressed, irritable, or hopeless Not at all   Total Score PHQ 2 0       There are no discontinued medications.

## 2021-04-01 ENCOUNTER — TELEPHONE (OUTPATIENT)
Dept: INTERNAL MEDICINE CLINIC | Age: 66
End: 2021-04-01

## 2021-04-01 DIAGNOSIS — Z79.4 TYPE 2 DIABETES MELLITUS WITH HYPERGLYCEMIA, WITH LONG-TERM CURRENT USE OF INSULIN (HCC): Primary | ICD-10-CM

## 2021-04-01 DIAGNOSIS — E11.65 TYPE 2 DIABETES MELLITUS WITH HYPERGLYCEMIA, WITH LONG-TERM CURRENT USE OF INSULIN (HCC): Primary | ICD-10-CM

## 2021-04-01 RX ORDER — BLOOD-GLUCOSE SENSOR
EACH MISCELLANEOUS
Qty: 1 DEVICE | Refills: 0 | Status: SHIPPED | OUTPATIENT
Start: 2021-04-01 | End: 2021-04-08 | Stop reason: SDUPTHER

## 2021-04-01 RX ORDER — BLOOD-GLUCOSE TRANSMITTER
EACH MISCELLANEOUS
Qty: 1 DEVICE | Refills: 0 | Status: SHIPPED | OUTPATIENT
Start: 2021-04-01 | End: 2021-04-08 | Stop reason: SDUPTHER

## 2021-04-01 RX ORDER — BLOOD-GLUCOSE,RECEIVER,CONT
EACH MISCELLANEOUS
Qty: 1 EACH | Refills: 0 | Status: SHIPPED | OUTPATIENT
Start: 2021-04-01 | End: 2021-04-08 | Stop reason: SDUPTHER

## 2021-04-01 NOTE — TELEPHONE ENCOUNTER
Asiya Molina MD  You; Nathan Culp LPN 1 hour ago (2:53 PM)       I have sent prescription for Dexcom G6 sensor, transmitter and  device to [facility] DR CAMRON MELÉNDEZ. Message text     Patient notified.

## 2021-04-01 NOTE — TELEPHONE ENCOUNTER
Patient wants to know if she qualifies to get a dexicom. Patient called her insurance and they gave her names of companies(?) [employer], [employer] and [employer]. They told her they are in network and Dr. Miguelangel Rollins could call them to decide what to put the patient on? Patient states she wants a wearable sugar  that can wear on her stomach.  Patient states she does not have an endocrinologist.  Vangie Deng: [phone]

## 2021-04-07 NOTE — TELEPHONE ENCOUNTER
Patient advised to contact the company and let us know what they require and a fax number that we can send all the required information to.

## 2021-04-07 NOTE — TELEPHONE ENCOUNTER
Patient called stating she called her \"narrator\" and they told her the dexcom needs to be sent to a provider. They told her to have us send the script to [facility] Provider\". The phone number she provided is [phone]. She states it will take 14 days to receive the product but she would like us to expedite it so she can get it in 3 days.   CB: [phone]

## 2021-04-08 DIAGNOSIS — Z79.4 TYPE 2 DIABETES MELLITUS WITH HYPERGLYCEMIA, WITH LONG-TERM CURRENT USE OF INSULIN (HCC): ICD-10-CM

## 2021-04-08 DIAGNOSIS — E11.65 TYPE 2 DIABETES MELLITUS WITH HYPERGLYCEMIA, WITH LONG-TERM CURRENT USE OF INSULIN (HCC): ICD-10-CM

## 2021-04-08 RX ORDER — BLOOD-GLUCOSE,RECEIVER,CONT
EACH MISCELLANEOUS
Qty: 1 EACH | Refills: 0 | Status: SHIPPED | OUTPATIENT
Start: 2021-04-08 | End: 2021-06-28

## 2021-04-08 RX ORDER — BLOOD-GLUCOSE TRANSMITTER
EACH MISCELLANEOUS
Qty: 1 DEVICE | Refills: 0 | Status: SHIPPED | OUTPATIENT
Start: 2021-04-08 | End: 2021-06-28

## 2021-04-08 RX ORDER — BLOOD-GLUCOSE SENSOR
EACH MISCELLANEOUS
Qty: 1 DEVICE | Refills: 0 | Status: SHIPPED | OUTPATIENT
Start: 2021-04-08 | End: 2021-06-28

## 2021-04-08 NOTE — TELEPHONE ENCOUNTER
Patients insurance company called and gave the following fax number: [phone]. She states she spoke to someone named Michelle Kennedy at [facility] who said we need to send a prescription and notes.   CB: [phone]

## 2021-06-28 ENCOUNTER — OFFICE VISIT (OUTPATIENT)
Dept: INTERNAL MEDICINE CLINIC | Age: 66
End: 2021-06-28
Payer: MEDICARE

## 2021-06-28 VITALS
HEIGHT: 63 IN | TEMPERATURE: 97.7 F | SYSTOLIC BLOOD PRESSURE: 124 MMHG | BODY MASS INDEX: 24.98 KG/M2 | OXYGEN SATURATION: 98 % | RESPIRATION RATE: 14 BRPM | DIASTOLIC BLOOD PRESSURE: 68 MMHG | WEIGHT: 141 LBS | HEART RATE: 95 BPM

## 2021-06-28 DIAGNOSIS — I10 ESSENTIAL HYPERTENSION: Primary | ICD-10-CM

## 2021-06-28 DIAGNOSIS — Z51.81 ENCOUNTER FOR MEDICATION MONITORING: ICD-10-CM

## 2021-06-28 DIAGNOSIS — E55.9 VITAMIN D DEFICIENCY: ICD-10-CM

## 2021-06-28 DIAGNOSIS — E78.2 MIXED HYPERLIPIDEMIA: ICD-10-CM

## 2021-06-28 DIAGNOSIS — E11.65 TYPE 2 DIABETES MELLITUS WITH HYPERGLYCEMIA, WITH LONG-TERM CURRENT USE OF INSULIN (HCC): ICD-10-CM

## 2021-06-28 DIAGNOSIS — M79.89 RIGHT LEG SWELLING: ICD-10-CM

## 2021-06-28 DIAGNOSIS — Z79.4 TYPE 2 DIABETES MELLITUS WITH HYPERGLYCEMIA, WITH LONG-TERM CURRENT USE OF INSULIN (HCC): ICD-10-CM

## 2021-06-28 LAB
25(OH)D3 SERPL-MCNC: 32.2 NG/ML (ref 30–100)
ALBUMIN SERPL-MCNC: 3.6 G/DL (ref 3.5–5)
ALBUMIN/GLOB SERPL: 1 {RATIO} (ref 1.1–2.2)
ALP SERPL-CCNC: 131 U/L (ref 45–117)
ALT SERPL-CCNC: 22 U/L (ref 12–78)
ANION GAP SERPL CALC-SCNC: 5 MMOL/L (ref 5–15)
AST SERPL-CCNC: 18 U/L (ref 15–37)
BILIRUB SERPL-MCNC: 0.5 MG/DL (ref 0.2–1)
BUN SERPL-MCNC: 12 MG/DL (ref 6–20)
BUN/CREAT SERPL: 26 (ref 12–20)
CALCIUM SERPL-MCNC: 9.7 MG/DL (ref 8.5–10.1)
CHLORIDE SERPL-SCNC: 108 MMOL/L (ref 97–108)
CHOLEST SERPL-MCNC: 240 MG/DL
CO2 SERPL-SCNC: 28 MMOL/L (ref 21–32)
CREAT SERPL-MCNC: 0.47 MG/DL (ref 0.55–1.02)
ERYTHROCYTE [DISTWIDTH] IN BLOOD BY AUTOMATED COUNT: 14 % (ref 11.5–14.5)
EST. AVERAGE GLUCOSE BLD GHB EST-MCNC: 174 MG/DL
GLOBULIN SER CALC-MCNC: 3.5 G/DL (ref 2–4)
GLUCOSE SERPL-MCNC: 174 MG/DL (ref 65–100)
HBA1C MFR BLD: 7.7 % (ref 4–5.6)
HCT VFR BLD AUTO: 34.9 % (ref 35–47)
HDLC SERPL-MCNC: 49 MG/DL
HDLC SERPL: 4.9 {RATIO} (ref 0–5)
HGB BLD-MCNC: 10.6 G/DL (ref 11.5–16)
LDLC SERPL CALC-MCNC: 168.8 MG/DL (ref 0–100)
MCH RBC QN AUTO: 27.1 PG (ref 26–34)
MCHC RBC AUTO-ENTMCNC: 30.4 G/DL (ref 30–36.5)
MCV RBC AUTO: 89.3 FL (ref 80–99)
NRBC # BLD: 0 K/UL (ref 0–0.01)
NRBC BLD-RTO: 0 PER 100 WBC
PLATELET # BLD AUTO: 223 K/UL (ref 150–400)
PMV BLD AUTO: 11 FL (ref 8.9–12.9)
POTASSIUM SERPL-SCNC: 3.5 MMOL/L (ref 3.5–5.1)
PROT SERPL-MCNC: 7.1 G/DL (ref 6.4–8.2)
RBC # BLD AUTO: 3.91 M/UL (ref 3.8–5.2)
SODIUM SERPL-SCNC: 141 MMOL/L (ref 136–145)
TRIGL SERPL-MCNC: 111 MG/DL (ref ?–150)
VLDLC SERPL CALC-MCNC: 22.2 MG/DL
WBC # BLD AUTO: 4.6 K/UL (ref 3.6–11)

## 2021-06-28 PROCEDURE — 99214 OFFICE O/P EST MOD 30 MIN: CPT | Performed by: INTERNAL MEDICINE

## 2021-06-28 PROCEDURE — 3017F COLORECTAL CA SCREEN DOC REV: CPT | Performed by: INTERNAL MEDICINE

## 2021-06-28 PROCEDURE — G8420 CALC BMI NORM PARAMETERS: HCPCS | Performed by: INTERNAL MEDICINE

## 2021-06-28 PROCEDURE — G9899 SCRN MAM PERF RSLTS DOC: HCPCS | Performed by: INTERNAL MEDICINE

## 2021-06-28 PROCEDURE — G8400 PT W/DXA NO RESULTS DOC: HCPCS | Performed by: INTERNAL MEDICINE

## 2021-06-28 PROCEDURE — G8432 DEP SCR NOT DOC, RNG: HCPCS | Performed by: INTERNAL MEDICINE

## 2021-06-28 PROCEDURE — G8536 NO DOC ELDER MAL SCRN: HCPCS | Performed by: INTERNAL MEDICINE

## 2021-06-28 PROCEDURE — G8754 DIAS BP LESS 90: HCPCS | Performed by: INTERNAL MEDICINE

## 2021-06-28 PROCEDURE — 3051F HG A1C>EQUAL 7.0%<8.0%: CPT | Performed by: INTERNAL MEDICINE

## 2021-06-28 PROCEDURE — 1101F PT FALLS ASSESS-DOCD LE1/YR: CPT | Performed by: INTERNAL MEDICINE

## 2021-06-28 PROCEDURE — G8752 SYS BP LESS 140: HCPCS | Performed by: INTERNAL MEDICINE

## 2021-06-28 PROCEDURE — 2022F DILAT RTA XM EVC RTNOPTHY: CPT | Performed by: INTERNAL MEDICINE

## 2021-06-28 PROCEDURE — 1090F PRES/ABSN URINE INCON ASSESS: CPT | Performed by: INTERNAL MEDICINE

## 2021-06-28 PROCEDURE — G8427 DOCREV CUR MEDS BY ELIG CLIN: HCPCS | Performed by: INTERNAL MEDICINE

## 2021-06-28 NOTE — PROGRESS NOTES
Ann Vidal is a 76 y.o. female and presents with Cholesterol Problem (3 mo fu) and Diabetes      Subjective:  Patient comes in today for 3-month follow-up. Patient reports right lower leg swelling since the past 2 weeks. She denies any history of trauma or injury to the site. She denies any symptoms on the left side. She denies any history of bleeding or clotting disorders. Does have pitting edema on the right side with soreness on the right side especially around the ankle and calf. Type 2 diabetesinsulin-dependent. Uncontrolled. Last A1c 7.4. Currently on 10 units of Lantus at night. Remains on glipizide and metformin. Today glipizide only as needed. Denies any side effects from the medication. Has not been checking her blood sugar level since it is difficult for her to do pinpricks. She has applied for the Dexcom continuous glucose monitor however still waiting for to get approved. She denies hypoglycemic events. Denies numbness or tingling sensation in upper or lower extremity. She has been actively participating in all diabetic education classes. Her sugar control is much better. She is taking also modifications very seriously. Hypertensionlabile. At goal.  Currently off antihypertensive. We started a small dose of lisinopril however she became hypotensive and we had to discontinue it. Hyperlipidemiawas supposed to be on a statin however she is not sure if she is taking it or not. Patient had colonoscopy done on 9/16/2020. Descending colon showed a small semipedunculated polyp in descending colon. S/p polypectomy. Repeat in 5 years. Procedure performed by Dr. Radha Bailey.       Past Medical History:   Diagnosis Date    Diabetes [facility])      Past Surgical History:   Procedure Laterality Date    COLONOSCOPY N/A 9/16/2020    COLONOSCOPY performed by Suyapa Wilson MD at \[facility]\"" AMBULATORY OR    HX COLONOSCOPY      HX OOPHORECTOMY Left      No Known Allergies  Current Outpatient Medications   Medication Sig Dispense Refill    metFORMIN (GLUCOPHAGE) 500 mg tablet Take 2 Tabs by mouth two (2) times daily (with meals) for 90 days. Indications: type 2 diabetes mellitus 360 Tab 1    glipiZIDE (GLUCOTROL) 5 mg tablet Take 0.5 Tabs by mouth two (2) times a day for 90 days. Indications: type 2 diabetes mellitus (Patient taking differently: Take 5 mg by mouth daily. Indications: type 2 diabetes mellitus) 90 Tab 0    insulin glargine (LANTUS,BASAGLAR) 100 unit/mL (3 mL) inpn 10 Units by SubCUTAneous route nightly. Indications: type 2 diabetes mellitus 1 Adjustable Dose Pre-filled Pen Syringe 3    Accu-Chek Fastclix Lancet Drum misc USE   TO CHECK GLUCOSE THREE TIMES DAILY 102 Each 0    Accu-Chek Guide test strips strip USE 1 STRIP TO CHECK GLUCOSE THREE TIMES DAILY 100 Strip 0    Blood-Glucose Meter monitoring kit As directed 1 Kit 0    Insulin Needles, Disposable, 31 gauge x 5/16\" ndle Daily use 1 Package 11    multivitamin (ONE A DAY) tablet Take 1 Tab by mouth daily.  atorvastatin (LIPITOR) 20 mg tablet Take 1 tablet by mouth once daily for 90 days (Patient not taking: Reported on 6/28/2021) 90 Tab 0     Social History     Socioeconomic History    Marital status:      Spouse name: Not on file    Number of children: Not on file    Years of education: Not on file    Highest education level: Not on file   Tobacco Use    Smoking status: Never Smoker    Smokeless tobacco: Never Used   Vaping Use    Vaping Use: Never used   Substance and Sexual Activity    Alcohol use: Never    Drug use: Never    Sexual activity: Not Currently     Social Determinants of Health     Financial Resource Strain:     Difficulty of Paying Living Expenses:    Food Insecurity:     Worried About Running Out of Food in the Last Year:     [address] in the Last Year:    Transportation Needs:     Lack of Transportation (Medical):      Lack of Transportation (Non-Medical): Physical Activity:     Days of Exercise per Week:     Minutes of Exercise per Session:    Stress:     Feeling of Stress :    Social Connections:     Frequency of Communication with Friends and Family:     Frequency of Social Gatherings with Friends and Family:     Attends Sikhism Services:     Active Member of Clubs or Organizations:     Attends Club or Organization Meetings:     Marital Status:      Family History   Problem Relation Age of Onset    Alzheimer Mother     No Known Problems Father        Review of Systems  ROS is unremarkable except for ones highlighted in bold.    Constitutional: negative for fevers, chills, anorexia and weight loss  Eyes:   negative for visual disturbance and irritation  ENT:   negative for tinnitus,sore throat,nasal congestion,ear pain,hoarseness  Respiratory:  negative for cough, hemoptysis, dyspnea,wheezing  CV:   negative for chest pain, palpitations, lower extremity edema  GI:   negative for nausea, vomiting, diarrhea, abdominal pain,melena  Endo:               negative for polyuria,polydipsia,polyphagia,heat intolerance  Genitourinary: negative for frequency, dysuria and hematuria  Integumentary: negative for rash and pruritus  Hematologic:  negative for easy bruising and gum/nose bleeding  Musculoskel: negative for myalgias, arthralgias, back pain, muscle weakness, joint pain  Neurological:  negative for headaches, dizziness, vertigo, memory problems and gait   Behavl/Psych: negative for feelings of anxiety, depression, mood changes  ROS otherwise negative     Objective:  Visit Vitals  /68 (BP 1 Location: Left upper arm, BP Patient Position: Sitting, BP Cuff Size: Adult)   Pulse 95   Temp 97.7 °F (36.5 °C)   Resp 14   Ht 5' 3\" (1.6 m)   Wt 141 lb (64 kg)   SpO2 98%   BMI 24.98 kg/m²     Physical Exam:   General appearance - alert, well appearing, and in no distress  Mental status - alert, oriented to person, place, and time  EYE-SHAINA, EOMI, fundi normal, corneas normal, no foreign bodies  ENT-ENT exam normal, no neck nodes or sinus tenderness  Nose - normal and patent, no erythema, discharge or polyps  Mouth - mucous membranes moist, pharynx normal without lesions  Neck - supple, no significant adenopathy   Chest - clear to auscultation, no wheezes, rales or rhonchi, symmetric air entry   Heart - normal rate, regular rhythm, normal S1, S2, no murmurs, rubs, clicks or gallops   Abdomen - soft, nontender, nondistended, no masses or organomegaly  Lymph- no adenopathy palpable  Ext-peripheral pulses normal, no pedal edema, no clubbing or cyanosis, right lower leg swelling and tenderness, slight redness near the right foot and ankle  Skin-Warm and dry. no hyperpigmentation, vitiligo, or suspicious lesions  Neuro -alert, oriented, normal speech, no focal findings or movement disorder noted  Musculoskeletal- FROM, no bony abnormalities, no point tenderness    Lab Review:  Results for orders placed or performed in visit on 03/29/21   LIPID PANEL   Result Value Ref Range    Cholesterol, total 201 (H) 0 - 200 mg/dL    Triglyceride 115 0 - 200 mg/dL    HDL Cholesterol 47 35 - 130 mg/dL    VLDL 23 mg/dL    LDL, calculated 131 (H) 0 - 130 mg/dL    CHOL/HDL Ratio 4 0 - 4 Ratio    LDL/HDL Ratio 3 Ratio   METABOLIC PANEL, COMPREHENSIVE   Result Value Ref Range    Glucose 151 (H) 65 - 105 mg/dL    BUN 14.0 7.0 - 17.0 mg/dL    Creatinine 0.6 (L) 0.7 - 1.2 mg/dL    Sodium 143 137 - 145 mmol/L    Potassium 4.0 3.6 - 5.0 mmol/L    Chloride 101 98 - 107 mmol/L    CO2 33.0 (H) 22.0 - 32.0 mmol/L    Calcium 9.8 8.4 - 10.2 mg/dl    Protein, total 7.2 6.3 - 8.2 g/dL    Albumin 3.9 3.9 - 5.4 g/dL    ALT (SGPT) 25 0 - 35 U/L    AST (SGOT) 29.0 14.0 - 36.0 U/L    Alk.  phosphatase 129 (H) 38 - 126 U/L    Bilirubin, total 0.5 0.2 - 1.3 mg/dL    BUN/Creatinine ratio 23 Ratio    GFR est AA >60 mL/min/1.73m2    GFR est non-AA >60 mL/min/1.73m2    Globulin 3.30     A-G Ratio 1.2 Ratio    Anion gap 9 mmol/L   HEMOGLOBIN A1C W/O EAG   Result Value Ref Range    Hemoglobin A1c 7.4 (H) 4.0 - 5.7 %   CBC W/O DIFF   Result Value Ref Range    WBC 4.7 3.6 - 11.0 K/uL    RBC 3.85 3.80 - 5.20 M/uL    HGB 10.5 (L) 11.5 - 16.0 g/dL    HCT 33.0 (L) 35.0 - 47.0 %    MCV 85.7 80.0 - 99.0 FL    MCH 27.3 26.0 - 34.0 PG    MCHC 31.8 30.0 - 36.5 g/dL    RDW 13.3 11.5 - 14.5 %    PLATELET 748 565 - 070 K/uL    MPV 11.4 8.9 - 12.9 FL    NRBC 0.0 0  WBC    ABSOLUTE NRBC 0.00 0.00 - 0.01 K/uL        Documenation Review:    Assessment/Plan:    Diagnoses and all orders for this visit:    1. Essential hypertension  -     CBC W/O DIFF; Future  -     METABOLIC PANEL, COMPREHENSIVE; Future    2. Mixed hyperlipidemia  -     LIPID PANEL; Future    3. Type 2 diabetes mellitus with hyperglycemia, with long-term current use of insulin (HCC)  -     HEMOGLOBIN A1C WITH EAG; Future    4. Encounter for medication monitoring    5. Vitamin D deficiency  -     VITAMIN D, 25 HYDROXY; Future      Check ultrasound of right lower extremity to rule out clot. Differential includes DVT, venous insufficiency in the right side. Patient not sure if she is taking Lipitor. She will give us a call later in the day to let us know. She is taking glipizide half a tablet only as needed. Currently on Lantus and Metformin. Denies hypoglycemic events. Continue current meds  I will call with lab results and make further recommendations or adjustments if necessary. Discussed lifestyle modifications including Na restriction, low carb/fat diet, weight reduction and exercise (at least a walking program). ICD-10-CM ICD-9-CM    1. Essential hypertension  I10 401.9 CBC W/O DIFF      METABOLIC PANEL, COMPREHENSIVE   2. Mixed hyperlipidemia  E78.2 272.2 LIPID PANEL   3. Type 2 diabetes mellitus with hyperglycemia, with long-term current use of insulin (HCC)  E11.65 250.00 HEMOGLOBIN A1C WITH EAG    Z79.4 790.29      V58.67    4.  Encounter for medication monitoring  Z51.81 V58.83    5. Vitamin D deficiency  E55.9 268.9 VITAMIN D, 25 HYDROXY             I have reviewed with the patient details of the assessment and plan and all questions were answered. Relevent patient education was performed. Verbal and/or written instructions (see AVS) provided. The most recent lab findings were reviewed with the patient. Plan was discussed with patient who verbally expressed understanding. An After Visit Summary was printed and given to the patient.     Yonas Francois MD

## 2021-06-28 NOTE — PATIENT INSTRUCTIONS
Learning About Type 2 Diabetes  What is type 2 diabetes? Type 2 diabetes is a condition in which you have too much sugar (glucose) in your blood. Glucose is a type of sugar produced in your body when carbohydrates and other foods are digested. It provides energy to cells throughout the body. Normally, blood sugar levels increase after you eat a meal. When blood sugar rises, cells in the pancreas release insulin, which causes the body to absorb sugar from the blood and lowers the blood sugar level to normal.  When you have type 2 diabetes, sugar stays in the blood rather than entering the body's cells to be used for energy. This results in high blood sugar. It happens when your body can't use insulin the right way. Over time, high blood sugar can harm many parts of the body, such as your eyes, heart, blood vessels, nerves, and kidneys. It can also increase your risk for other health problems (complications). What can you expect with type 2 diabetes? Marcelina Casarez keep hearing about how important it is to keep your blood sugar within a target range. That's because over time, high blood sugar can lead to serious problems. It can:  · Harm your eyes, nerves, and kidneys. · Damage your blood vessels, leading to heart disease and stroke. · Reduce blood flow and cause nerve damage to parts of your body, especially your feet. This can cause slow healing and pain when you walk. · Make your immune system weak and less able to fight infections. When people hear the word \"diabetes,\" they often think of problems like these. But daily care and treatment can help prevent or delay these problems. The goal is to keep your blood sugar in a target range. That's the best way to reduce your chance of having more problems from diabetes. What are the symptoms? Some people who have type 2 diabetes may not have any symptoms early on.  Many people with the disease don't even know they have it at first. But with time, diabetes starts to cause symptoms. You have most symptoms of type 2 diabetes when your blood sugar is either too high or too low. The most common symptoms of high blood sugar include:  · Thirst.  · Needing to urinate often. · Weight loss. · Blurry vision. The symptoms of low blood sugar include:  · Sweating. · Shakiness. · Weakness. · Hunger. · Confusion. You're not likely to get symptoms of low blood sugar unless you take insulin or use certain diabetes medicines that lower blood sugar. How can you help prevent type 2 diabetes? There are things you can do to help prevent type 2 diabetes. Stay at a healthy weight. Exercise regularly, and eat healthy foods. Even small changes can make a difference. If you have prediabetes, the medicine metformin can help prevent type 2 diabetes. How is type 2 diabetes treated? Treatment for type 2 diabetes will change over time to meet your needs. But the focus of your treatment will usually be to keep your blood sugar levels in your target range. This will help prevent problems such as eye, kidney, heart, blood vessel, and nerve disease. Some people may need medicines to help their bodies make insulin or decrease insulin resistance. Some medicines slow down how quickly the body absorbs carbohydrates. Treatment to manage type 2 diabetes includes:  · Making healthy food choices and being active. · Losing weight, if you need to. · Seeing your doctor regularly. · Keeping your blood sugar in your target range. · Taking medicines, if you need them. · Quitting smoking, if you smoke. · Keeping your blood pressure and cholesterol under control. Follow-up care is a key part of your treatment and safety. Be sure to make and go to all appointments, and call your doctor if you are having problems. It's also a good idea to know your test results and keep a list of the medicines you take. Where can you learn more?   Go to http://www.gray.com/  Enter [id number] in the search box to learn more about \"Learning About Type 2 Diabetes. \"  Current as of: August 31, 2020               Content Version: 12.8  © 2006-2021 [employer]. Care instructions adapted under license by [employer] (which disclaims liability or warranty for this information). If you have questions about a medical condition or this instruction, always ask your healthcare professional. [place] [address] any warranty or liability for your use of this information.

## 2021-06-28 NOTE — PROGRESS NOTES
Garcia Saeed is a 76 y.o. female presenting for Cholesterol Problem (3 mo fu) and Diabetes  . 1. Have you been to the ER, urgent care clinic since your last visit? Hospitalized since your last visit? No    2. Have you seen or consulted any other health care providers outside of the [address] [place] [address] since your last visit? Include any pap smears or colon screening. No    Fall Risk Assessment, last 12 mths 3/29/2021   Able to walk? Yes   Fall in past 12 months? 0   Do you feel unsteady? 0   Are you worried about falling 0         Abuse Screening Questionnaire 3/29/2021   Do you ever feel afraid of your partner? N   Are you in a relationship with someone who physically or mentally threatens you? N   Is it safe for you to go home? Y       3 most recent PHQ Screens 3/29/2021   Little interest or pleasure in doing things Not at all   Feeling down, depressed, irritable, or hopeless Not at all   Total Score PHQ 2 0       There are no discontinued medications.

## 2021-06-29 NOTE — PROGRESS NOTES
Patient quit taking Lipitor by herself. Yesterday during office visit told patient to restart taking Lipitor 40 mg. LDL/bad cholesterol is now increased since she has not been taking her statin. A1c 7.7. Poor control of diabetes. Patient has been taking glipizide only as needed. Recommend increasing Lantus to 12 units at night, take Metformin at current dose and take glipizide 1 tablet 2 times in a day instead of as needed.

## 2021-07-12 ENCOUNTER — HOSPITAL ENCOUNTER (OUTPATIENT)
Dept: ULTRASOUND IMAGING | Age: 66
Discharge: HOME | End: 2021-07-12
Attending: INTERNAL MEDICINE
Payer: MEDICARE

## 2021-07-12 DIAGNOSIS — R19.09 LUMP IN THE GROIN: Primary | ICD-10-CM

## 2021-07-12 DIAGNOSIS — M79.89 RIGHT LEG SWELLING: ICD-10-CM

## 2021-07-12 PROCEDURE — 93971 EXTREMITY STUDY: CPT

## 2021-07-19 ENCOUNTER — TELEPHONE (OUTPATIENT)
Dept: INTERNAL MEDICINE CLINIC | Age: 66
End: 2021-07-19

## 2021-07-19 ENCOUNTER — OFFICE VISIT (OUTPATIENT)
Dept: SURGERY | Age: 66
End: 2021-07-19
Payer: MEDICARE

## 2021-07-19 VITALS
BODY MASS INDEX: 25.02 KG/M2 | SYSTOLIC BLOOD PRESSURE: 132 MMHG | WEIGHT: 141.2 LBS | HEART RATE: 108 BPM | TEMPERATURE: 97.2 F | OXYGEN SATURATION: 100 % | DIASTOLIC BLOOD PRESSURE: 72 MMHG | HEIGHT: 63 IN

## 2021-07-19 DIAGNOSIS — R59.1 LYMPHADENOPATHY: Primary | ICD-10-CM

## 2021-07-19 PROCEDURE — G8419 CALC BMI OUT NRM PARAM NOF/U: HCPCS | Performed by: SURGERY

## 2021-07-19 PROCEDURE — G8510 SCR DEP NEG, NO PLAN REQD: HCPCS | Performed by: SURGERY

## 2021-07-19 PROCEDURE — G8754 DIAS BP LESS 90: HCPCS | Performed by: SURGERY

## 2021-07-19 PROCEDURE — 1101F PT FALLS ASSESS-DOCD LE1/YR: CPT | Performed by: SURGERY

## 2021-07-19 PROCEDURE — 3017F COLORECTAL CA SCREEN DOC REV: CPT | Performed by: SURGERY

## 2021-07-19 PROCEDURE — G8536 NO DOC ELDER MAL SCRN: HCPCS | Performed by: SURGERY

## 2021-07-19 PROCEDURE — G8427 DOCREV CUR MEDS BY ELIG CLIN: HCPCS | Performed by: SURGERY

## 2021-07-19 PROCEDURE — 1090F PRES/ABSN URINE INCON ASSESS: CPT | Performed by: SURGERY

## 2021-07-19 PROCEDURE — G8400 PT W/DXA NO RESULTS DOC: HCPCS | Performed by: SURGERY

## 2021-07-19 PROCEDURE — G9899 SCRN MAM PERF RSLTS DOC: HCPCS | Performed by: SURGERY

## 2021-07-19 PROCEDURE — G8752 SYS BP LESS 140: HCPCS | Performed by: SURGERY

## 2021-07-19 PROCEDURE — 99204 OFFICE O/P NEW MOD 45 MIN: CPT | Performed by: SURGERY

## 2021-07-19 NOTE — TELEPHONE ENCOUNTER
Pharmacy stated that Ms. Oakley is not filling her statin and she is showing she is a diabetic over 40. They stated they wanted Dr. Fabian Khanna or [facility] to speak to the patient about her medication. They stated the reference number to this case is Dr. Ke Downing. Please call and advise.     Thank you,  Isaac Martinez

## 2021-07-19 NOTE — PROGRESS NOTES
HISTORY OF PRESENT ILLNESS  Alexsandra Modi is a 76 y.o. female. Was having some leg swelling  Got a doppler that showed an enlarged lymph node    Reports that the swelling has improved. She is here to assess the groin for possible enlarged lymph node.     Had an episode once before  Appetite ok  BMs normal    Working on her blood glu    Doppler last week:            ____________________________________________________________________________  Patient presents with:  Mass: Seen at the request of Dr Rene Clifton for eval of lump in groin    /72 (BP 1 Location: Left arm, BP Patient Position: Sitting)   Pulse (!) 108   Temp 97.2 °F (36.2 °C) (Oral)   Ht 5' 3\" (1.6 m)   Wt 64 kg (141 lb 3.2 oz)   SpO2 100%   BMI 25.01 kg/m²   Past Medical History:  No date: Diabetes ([address]  No date: Hypercholesterolemia  Past Surgical History:  9/16/2020: COLONOSCOPY; N/A      Comment:  COLONOSCOPY performed by Olesya Millan MD at [facility]                AMBULATORY OR  No date: HX COLONOSCOPY  No date: HX OOPHORECTOMY; Left  Social History    Socioeconomic History      Marital status:       Spouse name: Not on file      Number of children: Not on file      Years of education: Not on file      Highest education level: Not on file    Tobacco Use      Smoking status: Never Smoker      Smokeless tobacco: Never Used    Vaping Use      Vaping Use: Never used    Substance and Sexual Activity      Alcohol use: Never      Drug use: Never      Sexual activity: Not Currently    Social Determinants of Health  Financial Resource Strain:       Difficulty of Paying Living Expenses:   Food Insecurity:       Worried About Running Out of Food in the Last Year:       Ran Out of Food in the Last Year:   Transportation Needs:       Lack of Transportation (Medical):       Lack of Transportation (Non-Medical):   Physical Activity:       Days of Exercise per Week:       Minutes of Exercise per Session:   Stress:       Feeling of Stress :   Social Connections:       Frequency of Communication with Friends and Family:       Frequency of Social Gatherings with Friends and Family:       Attends [employer] Services:       Active Member of Clubs or Organizations:       Attends Club or Organization Meetings:       Marital Status:   Review of patient's family history indicates:  Problem: Alzheimer      Relation: Mother          Age of Onset: (Not Specified)  Problem: No Known Problems      Relation: Father          Age of Onset: (Not Specified)    Current Outpatient Medications:  atorvastatin (LIPITOR) 20 mg tablet, Take 1 tablet by mouth once daily for 90 days  insulin glargine (LANTUS,BASAGLAR) 100 unit/mL (3 mL) inpn, 10 Units by SubCUTAneous route nightly. Indications: type 2 diabetes mellitus  Accu-Chek Fastclix Lancet Drum misc, USE   TO CHECK GLUCOSE THREE TIMES DAILY  Accu-Chek Guide test strips strip, USE 1 STRIP TO CHECK GLUCOSE THREE TIMES DAILY  Blood-Glucose Meter monitoring kit, As directed  Insulin Needles, Disposable, 31 gauge x 5/16\" ndle, Daily use  multivitamin (ONE A DAY) tablet, Take 1 Tab by mouth daily. No current facility-administered medications for this visit. Allergies: No Known Allergies  _____________________________________________________________________________      Mass  The history is provided by the patient. This is a new problem. The current episode started more than 1 week ago. The problem occurs constantly. The problem has not changed since onset. Pertinent negatives include no chest pain, no abdominal pain, no headaches and no shortness of breath. The treatment provided no relief. Review of Systems   Constitutional: Negative for chills, fever and weight loss. HENT: Negative for ear pain. Eyes: Negative for pain. Respiratory: Negative for shortness of breath. Cardiovascular: Negative for chest pain. Gastrointestinal: Negative for abdominal pain and blood in stool. Genitourinary: Negative for hematuria. Musculoskeletal: Negative for joint pain. Skin: Negative for rash. Neurological: Negative for dizziness, focal weakness, seizures and headaches. Endo/Heme/Allergies: Does not bruise/bleed easily. Psychiatric/Behavioral: The patient does not have insomnia. Physical Exam  Constitutional:       General: She is not in acute distress. Appearance: She is well-developed. She is not diaphoretic. HENT:      Head: Normocephalic and atraumatic. Mouth/Throat:      Pharynx: No oropharyngeal exudate. Eyes:      Pupils: Pupils are equal, round, and reactive to light. Neck:      Trachea: No tracheal deviation. Cardiovascular:      Rate and Rhythm: Normal rate and regular rhythm. Heart sounds: Normal heart sounds. No murmur heard. Pulmonary:      Effort: Pulmonary effort is normal. No respiratory distress. Breath sounds: Normal breath sounds. No wheezing. Abdominal:      General: Bowel sounds are normal. There is no distension. Palpations: Abdomen is soft. There is no mass. Tenderness: There is no abdominal tenderness. There is no guarding or rebound. Musculoskeletal:         General: No tenderness. Normal range of motion. Cervical back: Normal range of motion. Lymphadenopathy:      Cervical: No cervical adenopathy. Lower Body: Left inguinal adenopathy present. Skin:     General: Skin is warm. Findings: No erythema or rash. Neurological:      Mental Status: She is alert and oriented to person, place, and time. Psychiatric:         Behavior: Behavior normal.         ASSESSMENT and PLAN    ICD-10-CM ICD-9-CM    1. Lymphadenopathy  R59.1 785.6 CT ABD PELV W CONT       We discussed a differential diagnosis of lymphadenopathy. We discussed the most common cause for lymphadenopathy being infectious. We discussed other causes such as lymphoma. On exam today I cannot feel the enlarged lymph node in question in the right groin.   I do however feel that a firm enlarged lymph node in the left groin. With evidence of bilateral groin lymphadenopathy I think further imaging is reasonable. I will continue the work-up with a CT scan. Further management after the CT scan. Expressed understanding of her discussion and agreeable with the plan. Thank you for this consult. I had an extensive and thorough discussion with Anisha Marrero regarding current diagnosis and treatment recommendations. Total time spend with her was 45 minutes.   This included the following:  preparing to see the patient (reviewing prior records and tests),  performing a medically appropriate examination and/or evaluation,  counseling and educating the patient/family/caregiver,  documenting clinical information in the electronic or other health record,  independently interpreting results and communicating results to the patient/family/caregiver,  ordering medications, tests, or procedures,  referring and communicating with other health care professionals,  care coordination

## 2021-07-19 NOTE — Clinical Note
7/19/2021    Patient: Hodan Graham   YOB: 1955   Date of Visit: 7/19/2021     Evelin Mccormick MD  [facility]  [facility]  [facility] [facility] [address]    Dear Evelin Mccormick MD,      Thank you for referring Ms. Emma Oakley to  [facility] for evaluation. My notes for this consultation are attached. If you have questions, please do not hesitate to call me. I look forward to following your patient along with you.       Sincerely,    Endy Cruz MD

## 2021-07-19 NOTE — PROGRESS NOTES
Identified pt with two pt identifiers(name and ). Reviewed record in preparation for visit and have obtained necessary documentation. All patient medications has been reviewed. Chief Complaint   Patient presents with    Mass     Seen at the request of Dr Tanesha Cote for eval of lump in [facility] Due   Topic    Hepatitis C Screening     Eye Exam Retinal or Dilated     COVID-19 Vaccine (1)    DTaP/Tdap/Td series (1 - Tdap)    Shingrix Vaccine Age 49> (1 of 2)    Breast Cancer Screen Mammogram     Pneumococcal 65+ years (1 of 1 - PPSV23)    Foot Exam Q1     MICROALBUMIN Q1     Medicare Yearly Exam        Vitals:    21 1403   Temp: 97.2 °F (36.2 °C)   TempSrc: Oral   Weight: 64 kg (141 lb 3.2 oz)   PainSc:   0 - No pain       4. Have you been to the ER, urgent care clinic since your last visit? Hospitalized since your last visit? No    5. Have you seen or consulted any other health care providers outside of the [address] [place] [address] since your last visit? Include any pap smears or colon screening. Yes When: 21 Where: PCP Reason for visit: routine 3month check up       Patient is accompanied by self I have received verbal consent from Jonny Shelby to discuss any/all medical information while they are present in the room.

## 2021-07-20 NOTE — TELEPHONE ENCOUNTER
Franco Gomes MD  You; Lang Calzada 20 hours ago (11:53 AM)   Vita Jerry  I have already talked to the patient about being compliant with the statin.  I have talked to her twice about the same thing.     Message text

## 2021-07-23 ENCOUNTER — HOSPITAL ENCOUNTER (OUTPATIENT)
Dept: CT IMAGING | Age: 66
Discharge: HOME | End: 2021-07-23
Attending: SURGERY
Payer: MEDICARE

## 2021-07-23 ENCOUNTER — TELEPHONE (OUTPATIENT)
Dept: SURGERY | Age: 66
End: 2021-07-23

## 2021-07-23 DIAGNOSIS — R59.1 LYMPHADENOPATHY: Primary | ICD-10-CM

## 2021-07-23 DIAGNOSIS — R59.1 LYMPHADENOPATHY: ICD-10-CM

## 2021-07-23 PROCEDURE — 74011000636 HC RX REV CODE- 636: Performed by: SURGERY

## 2021-07-23 PROCEDURE — 74011000250 HC RX REV CODE- 250: Performed by: SURGERY

## 2021-07-23 PROCEDURE — 74177 CT ABD & PELVIS W/CONTRAST: CPT

## 2021-07-23 RX ORDER — BARIUM SULFATE 20 MG/ML
900 SUSPENSION ORAL
Status: COMPLETED | OUTPATIENT
Start: 2021-07-23 | End: 2021-07-23

## 2021-07-23 RX ADMIN — BARIUM SULFATE 900 ML: 21 SUSPENSION ORAL at 06:52

## 2021-07-23 RX ADMIN — IOPAMIDOL 100 ML: 755 INJECTION, SOLUTION INTRAVENOUS at 06:55

## 2021-07-23 NOTE — TELEPHONE ENCOUNTER
Reviewed CT scan done today:  REPRODUCTIVE ORGANS: There are calcified uterine masses with the largest  measuring 6.2 x 4.7 cm consistent with calcified uterine leiomyomata. URINARY BLADDER: No mass or calculus. BONES: No destructive bone lesion. ABDOMINAL WALL: No mass or hernia. ADDITIONAL COMMENTS: There is a 2.3 x 1 cm lymph node in the right inguinal  region and there is a 3.4 x 1 cm lymph node in the left inguinal region  nonspecific but most likely reactive.     IMPRESSION     1. No acute abnormality is identified. 2. Bilateral inguinal lymph nodes are upper limits of normal to slightly  enlarged which is nonspecific and may be reactive. No intra-abdominal adenopathy  is identified. Reviewed CT scan findings with Kiki Reyna. Lymph nodes appear enlarged but nonspecific and most likely reactive. I also mentioned the fibroid uterus which she was aware of. The fibroids are pretty good size and may be contributing to her lower extremity symptoms such as the swelling. She will discuss with her gynecologist.  I suggested we do an ultrasound in 6 months to confirm resolution of the lymphadenopathy. Expressed understanding of our discussion and agreed with the plan.

## 2021-09-09 ENCOUNTER — OFFICE VISIT (OUTPATIENT)
Dept: INTERNAL MEDICINE CLINIC | Age: 66
End: 2021-09-09
Payer: MEDICARE

## 2021-09-09 VITALS
WEIGHT: 142.8 LBS | BODY MASS INDEX: 25.3 KG/M2 | OXYGEN SATURATION: 98 % | TEMPERATURE: 98.3 F | HEIGHT: 63 IN | DIASTOLIC BLOOD PRESSURE: 78 MMHG | HEART RATE: 92 BPM | RESPIRATION RATE: 18 BRPM | SYSTOLIC BLOOD PRESSURE: 130 MMHG

## 2021-09-09 DIAGNOSIS — Z86.010 HISTORY OF COLON POLYPS: ICD-10-CM

## 2021-09-09 DIAGNOSIS — Z12.31 BREAST CANCER SCREENING BY MAMMOGRAM: ICD-10-CM

## 2021-09-09 DIAGNOSIS — E78.2 MIXED HYPERLIPIDEMIA: ICD-10-CM

## 2021-09-09 DIAGNOSIS — Z78.0 POST-MENOPAUSAL: ICD-10-CM

## 2021-09-09 DIAGNOSIS — R59.0 INGUINAL LYMPHADENOPATHY: ICD-10-CM

## 2021-09-09 DIAGNOSIS — Z00.00 WELL WOMAN EXAM (NO GYNECOLOGICAL EXAM): ICD-10-CM

## 2021-09-09 DIAGNOSIS — Z01.818 PRE-OPERATIVE CLEARANCE: ICD-10-CM

## 2021-09-09 DIAGNOSIS — E11.9 TYPE 2 DIABETES MELLITUS WITHOUT COMPLICATION, WITHOUT LONG-TERM CURRENT USE OF INSULIN (HCC): ICD-10-CM

## 2021-09-09 DIAGNOSIS — I10 ESSENTIAL HYPERTENSION: ICD-10-CM

## 2021-09-09 DIAGNOSIS — E55.9 VITAMIN D DEFICIENCY: ICD-10-CM

## 2021-09-09 DIAGNOSIS — D25.9 UTERINE LEIOMYOMA, UNSPECIFIED LOCATION: Primary | ICD-10-CM

## 2021-09-09 DIAGNOSIS — R53.82 CHRONIC FATIGUE: ICD-10-CM

## 2021-09-09 LAB
25(OH)D3 SERPL-MCNC: 24.1 NG/ML (ref 30–100)
ALBUMIN SERPL-MCNC: 3.8 G/DL (ref 3.5–5)
ALBUMIN/GLOB SERPL: 1 {RATIO} (ref 1.1–2.2)
ALP SERPL-CCNC: 128 U/L (ref 45–117)
ALT SERPL-CCNC: 24 U/L (ref 12–78)
ANION GAP SERPL CALC-SCNC: 7 MMOL/L (ref 5–15)
AST SERPL-CCNC: 19 U/L (ref 15–37)
BILIRUB SERPL-MCNC: 0.6 MG/DL (ref 0.2–1)
BUN SERPL-MCNC: 10 MG/DL (ref 6–20)
BUN/CREAT SERPL: 17 (ref 12–20)
CALCIUM SERPL-MCNC: 9.3 MG/DL (ref 8.5–10.1)
CHLORIDE SERPL-SCNC: 107 MMOL/L (ref 97–108)
CHOLEST SERPL-MCNC: 154 MG/DL
CO2 SERPL-SCNC: 27 MMOL/L (ref 21–32)
CREAT SERPL-MCNC: 0.58 MG/DL (ref 0.55–1.02)
CREAT UR-MCNC: 20.2 MG/DL
ERYTHROCYTE [DISTWIDTH] IN BLOOD BY AUTOMATED COUNT: 13.6 % (ref 11.5–14.5)
EST. AVERAGE GLUCOSE BLD GHB EST-MCNC: 209 MG/DL
GLOBULIN SER CALC-MCNC: 3.8 G/DL (ref 2–4)
GLUCOSE SERPL-MCNC: 119 MG/DL (ref 65–100)
HBA1C MFR BLD: 8.9 % (ref 4–5.6)
HCT VFR BLD AUTO: 35.6 % (ref 35–47)
HDLC SERPL-MCNC: 49 MG/DL
HDLC SERPL: 3.1 {RATIO} (ref 0–5)
HGB BLD-MCNC: 10.9 G/DL (ref 11.5–16)
LDLC SERPL CALC-MCNC: 86.6 MG/DL (ref 0–100)
MCH RBC QN AUTO: 27.1 PG (ref 26–34)
MCHC RBC AUTO-ENTMCNC: 30.6 G/DL (ref 30–36.5)
MCV RBC AUTO: 88.6 FL (ref 80–99)
MICROALBUMIN UR-MCNC: 6.85 MG/DL
MICROALBUMIN/CREAT UR-RTO: 339 MG/G (ref 0–30)
NRBC # BLD: 0 K/UL (ref 0–0.01)
NRBC BLD-RTO: 0 PER 100 WBC
PLATELET # BLD AUTO: 218 K/UL (ref 150–400)
PMV BLD AUTO: 11.3 FL (ref 8.9–12.9)
POTASSIUM SERPL-SCNC: 3.9 MMOL/L (ref 3.5–5.1)
PROT SERPL-MCNC: 7.6 G/DL (ref 6.4–8.2)
RBC # BLD AUTO: 4.02 M/UL (ref 3.8–5.2)
SODIUM SERPL-SCNC: 141 MMOL/L (ref 136–145)
TRIGL SERPL-MCNC: 92 MG/DL (ref ?–150)
TSH SERPL DL<=0.05 MIU/L-ACNC: 3.98 UIU/ML (ref 0.36–3.74)
UR CULT HOLD, URHOLD: NORMAL
VLDLC SERPL CALC-MCNC: 18.4 MG/DL
WBC # BLD AUTO: 4.8 K/UL (ref 3.6–11)

## 2021-09-09 PROCEDURE — 2022F DILAT RTA XM EVC RTNOPTHY: CPT | Performed by: INTERNAL MEDICINE

## 2021-09-09 PROCEDURE — 99214 OFFICE O/P EST MOD 30 MIN: CPT | Performed by: INTERNAL MEDICINE

## 2021-09-09 PROCEDURE — 1090F PRES/ABSN URINE INCON ASSESS: CPT | Performed by: INTERNAL MEDICINE

## 2021-09-09 PROCEDURE — G8754 DIAS BP LESS 90: HCPCS | Performed by: INTERNAL MEDICINE

## 2021-09-09 PROCEDURE — G8752 SYS BP LESS 140: HCPCS | Performed by: INTERNAL MEDICINE

## 2021-09-09 PROCEDURE — 3017F COLORECTAL CA SCREEN DOC REV: CPT | Performed by: INTERNAL MEDICINE

## 2021-09-09 PROCEDURE — G9899 SCRN MAM PERF RSLTS DOC: HCPCS | Performed by: INTERNAL MEDICINE

## 2021-09-09 PROCEDURE — 1101F PT FALLS ASSESS-DOCD LE1/YR: CPT | Performed by: INTERNAL MEDICINE

## 2021-09-09 PROCEDURE — 3051F HG A1C>EQUAL 7.0%<8.0%: CPT | Performed by: INTERNAL MEDICINE

## 2021-09-09 PROCEDURE — 93000 ELECTROCARDIOGRAM COMPLETE: CPT | Performed by: INTERNAL MEDICINE

## 2021-09-09 PROCEDURE — G8510 SCR DEP NEG, NO PLAN REQD: HCPCS | Performed by: INTERNAL MEDICINE

## 2021-09-09 PROCEDURE — G8419 CALC BMI OUT NRM PARAM NOF/U: HCPCS | Performed by: INTERNAL MEDICINE

## 2021-09-09 PROCEDURE — G8536 NO DOC ELDER MAL SCRN: HCPCS | Performed by: INTERNAL MEDICINE

## 2021-09-09 PROCEDURE — G0439 PPPS, SUBSEQ VISIT: HCPCS | Performed by: INTERNAL MEDICINE

## 2021-09-09 PROCEDURE — G8400 PT W/DXA NO RESULTS DOC: HCPCS | Performed by: INTERNAL MEDICINE

## 2021-09-09 PROCEDURE — G8427 DOCREV CUR MEDS BY ELIG CLIN: HCPCS | Performed by: INTERNAL MEDICINE

## 2021-09-09 RX ORDER — ATORVASTATIN CALCIUM 20 MG/1
TABLET, FILM COATED ORAL
Qty: 90 TABLET | Refills: 0 | Status: SHIPPED | OUTPATIENT
Start: 2021-09-09 | End: 2022-01-04 | Stop reason: SDUPTHER

## 2021-09-09 RX ORDER — GLIPIZIDE 2.5 MG/1
TABLET, EXTENDED RELEASE ORAL DAILY
COMMUNITY
End: 2021-09-09

## 2021-09-09 RX ORDER — METFORMIN HYDROCHLORIDE 500 MG/1
TABLET ORAL
COMMUNITY
End: 2021-09-09

## 2021-09-09 RX ORDER — GLIPIZIDE 5 MG/1
2.5 TABLET ORAL 2 TIMES DAILY
Qty: 90 TABLET | Refills: 0 | Status: SHIPPED | OUTPATIENT
Start: 2021-09-09 | End: 2021-12-08

## 2021-09-09 RX ORDER — METFORMIN HYDROCHLORIDE 500 MG/1
1000 TABLET ORAL 2 TIMES DAILY WITH MEALS
Qty: 360 TABLET | Refills: 1 | Status: SHIPPED | OUTPATIENT
Start: 2021-09-09 | End: 2021-12-08

## 2021-09-09 NOTE — PROGRESS NOTES
John Willson is a 78 y.o. female  HIPAA verified by two patient identifiers. Chief Complaint   Patient presents with    Annual Wellness Visit       Depression Risk Factor Screening:     3 most recent PHQ Screens 9/9/2021   Little interest or pleasure in doing things Not at all   Feeling down, depressed, irritable, or hopeless Not at all   Total Score PHQ 2 0       Functional Ability and Level of Safety:     Activities of Daily Living  ADL Assessment 9/9/2021   Feeding yourself No Help Needed   Getting from bed to chair No Help Needed   Getting dressed No Help Needed   Bathing or showering No Help Needed   Walk across the room (includes cane/walker) No Help Needed   Using the telphone No Help Needed   Taking your medications No Help Needed   Preparing meals No Help Needed   Managing money (expenses/bills) No Help Needed   Moderately strenuous housework (laundry) No Help Needed   Shopping for personal items (toiletries/medicines) No Help Needed   Shopping for groceries No Help Needed   Driving No Help Needed   Climbing a flight of stairs No Help Needed   Getting to places beyond walking distances No Help Needed       Fall Risk  Fall Risk Assessment, last 12 mths 9/9/2021   Able to walk? Yes   Fall in past 12 months? 0   Do you feel unsteady? 0   Are you worried about falling 0       Abuse Screen  Abuse Screening Questionnaire 9/9/2021   Do you ever feel afraid of your partner? N   Are you in a relationship with someone who physically or mentally threatens you? N   Is it safe for you to go home?  Y         Patient Care Team   Patient Care Team:  Milagro Elizondo MD as PCP - General (Hospitalist)  Milagro Elizondo MD as PCP - [facility]  Jie Mckee MD as Surgeon (General Surgery)  Health Maintenance Due   Topic    Hepatitis C Screening     Eye Exam Retinal or Dilated     COVID-19 Vaccine (1)    DTaP/Tdap/Td series (1 - Tdap)    Shingrix Vaccine Age 50> (1 of 2)    Breast Cancer Screen Mammogram     Pneumococcal 65+ years (1 of 1 - PPSV23)    Foot Exam Q1     MICROALBUMIN Q1     Flu Vaccine (1)    Medicare Yearly Exam      Chief Complaint   Patient presents with   [address] [place] [address] Annual Wellness Visit     Visit Vitals  /78   Pulse 92   Temp 98.3 °F (36.8 °C) (Oral)   Resp 18   Ht 5' 3\" (1.6 m)   Wt 142 lb 12.8 oz (64.8 kg)   SpO2 98%   BMI 25.30 kg/m²       Pain Scale: 0 - No pain/10  Pain Location:   1. Have you been to the ER, urgent care clinic since your last visit? Hospitalized since your last visit? No    2. Have you seen or consulted any other health care providers outside of the [address] [place] [address] since your last visit? Include any pap smears or colon screening.  No

## 2021-09-09 NOTE — PATIENT INSTRUCTIONS
Learning About Type 2 Diabetes  What is type 2 diabetes? Type 2 diabetes is a condition in which you have too much sugar (glucose) in your blood. Glucose is a type of sugar produced in your body when carbohydrates and other foods are digested. It provides energy to cells throughout the body. Normally, blood sugar levels increase after you eat a meal. When blood sugar rises, cells in the pancreas release insulin, which causes the body to absorb sugar from the blood and lowers the blood sugar level to normal.  When you have type 2 diabetes, sugar stays in the blood rather than entering the body's cells to be used for energy. This results in high blood sugar. It happens when your body can't use insulin the right way. Over time, high blood sugar can harm many parts of the body, such as your eyes, heart, blood vessels, nerves, and kidneys. It can also increase your risk for other health problems (complications). What can you expect with type 2 diabetes? Bee Argueta keep hearing about how important it is to keep your blood sugar within a target range. That's because over time, high blood sugar can lead to serious problems. It can:  · Harm your eyes, nerves, and kidneys. · Damage your blood vessels, leading to heart disease and stroke. · Reduce blood flow and cause nerve damage to parts of your body, especially your feet. This can cause slow healing and pain when you walk. · Make your immune system weak and less able to fight infections. When people hear the word \"diabetes,\" they often think of problems like these. But daily care and treatment can help prevent or delay these problems. The goal is to keep your blood sugar in a target range. That's the best way to reduce your chance of having more problems from diabetes. What are the symptoms? Some people who have type 2 diabetes may not have any symptoms early on.  Many people with the disease don't even know they have it at first. But with time, diabetes starts to cause symptoms. You have most symptoms of type 2 diabetes when your blood sugar is either too high or too low. The most common symptoms of high blood sugar include:  · Thirst.  · Needing to urinate often. · Weight loss. · Blurry vision. The symptoms of low blood sugar include:  · Sweating. · Shakiness. · Weakness. · Hunger. · Confusion. You're not likely to get symptoms of low blood sugar unless you take insulin or use certain diabetes medicines that lower blood sugar. How can you help prevent type 2 diabetes? There are things you can do to help prevent type 2 diabetes. Stay at a healthy weight. Exercise regularly, and eat healthy foods. Even small changes can make a difference. If you have prediabetes, the medicine metformin can help prevent type 2 diabetes. How is type 2 diabetes treated? Treatment for type 2 diabetes will change over time to meet your needs. But the focus of your treatment will usually be to keep your blood sugar levels in your target range. This will help prevent problems such as eye, kidney, heart, blood vessel, and nerve disease. Some people may need medicines to help their bodies make insulin or decrease insulin resistance. Some medicines slow down how quickly the body absorbs carbohydrates. Treatment to manage type 2 diabetes includes:  · Making healthy food choices and being active. · Losing weight, if you need to. · Seeing your doctor regularly. · Keeping your blood sugar in your target range. · Taking medicines, if you need them. · Quitting smoking, if you smoke. · Keeping your blood pressure and cholesterol under control. Follow-up care is a key part of your treatment and safety. Be sure to make and go to all appointments, and call your doctor if you are having problems. It's also a good idea to know your test results and keep a list of the medicines you take. Where can you learn more?   Go to http://www.gray.com/  Enter [id number] in the search box to learn more about \"Learning About Type 2 Diabetes. \"  Current as of: August 31, 2020               Content Version: 12.8  © 2006-2021 [employer]. Care instructions adapted under license by [employer] (which disclaims liability or warranty for this information). If you have questions about a medical condition or this instruction, always ask your healthcare professional. [place] [address] any warranty or liability for your use of this information.

## 2021-09-09 NOTE — PROGRESS NOTES
Nehal Rucker is a 78 y.o. female and presents with Annual Wellness Visit      Subjective:  Patient comes in today for complete physical and annual Medicare wellness. Since last visit she did follow-up with general surgery Dr. Nav Villanueva for concerns of swelling in her legs and groin region. Due to complaints of swelling in the groin region bilaterally CT abdomen was ordered. CT abdomen pelvis showed bilateral inguinal lymph nodes normal to slightly enlarged which is nonspecific and may be reactive. No intra-abdominal adenopathy was identified. calcified uterine leiomyomata. Type 2 diabetesinsulin-dependent. Uncontrolled. Last A1c 7.7. She now has a Dexcom machine. Reports sugars are much better controlled. She is currently on Lantus 10 units. Also takes glipizide 2.5 mg p.o. twice daily and Metformin 1 g p.o. twice daily. Denies any hypoglycemic episodes. Does note some mild numbness in bilateral feet. Hypertensionlabile. At goal.  Currently off antihypertensive. We started a small dose of lisinopril however she became hypotensive and we had to discontinue it. Hyperlipidemiauncontrolled. Not sure if she was taking statin. Last office visit reinstated that she needs to be on statin. Currently she believes she is taking one. Patient had colonoscopy done on 9/16/2020. Descending colon showed a small semipedunculated polyp in descending colon. S/p polypectomy. Repeat in 5 years. Procedure performed by Dr. Derrick Curiel. Annual mammograms and Pap smearfollows up with [facility]. Reports recently had a mammogram which was unremarkable. History of fibroid she has not followed up with Dr. Jeannette Gunter. Denies any postmenopausal bleed. DEXA scan done in August 2020unremarkable.     Past Medical History:   Diagnosis Date    Diabetes ([facility] [address]     Hypercholesterolemia      Past Surgical History:   Procedure Laterality Date    COLONOSCOPY N/A 9/16/2020    COLONOSCOPY performed by Angelo Ortega MD at [facility] AMBULATORY OR    HX COLONOSCOPY      HX OOPHORECTOMY Left      No Known Allergies  Current Outpatient Medications   Medication Sig Dispense Refill    atorvastatin (LIPITOR) 20 mg tablet Take 1 tablet by mouth once daily for 90 days 90 Tablet 0    metFORMIN (GLUCOPHAGE) 500 mg tablet Take 2 Tablets by mouth two (2) times daily (with meals) for 90 days. Indications: type 2 diabetes mellitus 360 Tablet 1    glipiZIDE (GLUCOTROL) 5 mg tablet Take 0.5 Tablets by mouth two (2) times a day for 90 days. Indications: type 2 diabetes mellitus 90 Tablet 0    insulin glargine (LANTUS,BASAGLAR) 100 unit/mL (3 mL) inpn 10 Units by SubCUTAneous route nightly. Indications: type 2 diabetes mellitus 1 Adjustable Dose Pre-filled Pen Syringe 3    Accu-Chek Fastclix Lancet Drum misc USE   TO CHECK GLUCOSE THREE TIMES DAILY 102 Each 0    Accu-Chek Guide test strips strip USE 1 STRIP TO CHECK GLUCOSE THREE TIMES DAILY 100 Strip 0    Blood-Glucose Meter monitoring kit As directed 1 Kit 0    Insulin Needles, Disposable, 31 gauge x 5/16\" ndle Daily use 1 Package 11    multivitamin (ONE A DAY) tablet Take 1 Tab by mouth daily. Social History     Socioeconomic History    Marital status:      Spouse name: Not on file    Number of children: Not on file    Years of education: Not on file    Highest education level: Not on file   Tobacco Use    Smoking status: Never Smoker    Smokeless tobacco: Never Used   Vaping Use    Vaping Use: Never used   Substance and Sexual Activity    Alcohol use: Never    Drug use: Never    Sexual activity: Not Currently     Social Determinants of Health     Financial Resource Strain:     Difficulty of Paying Living Expenses:    Food Insecurity:     Worried About Running Out of Food in the Last Year:     [address] in the Last Year:    Transportation Needs:     Lack of Transportation (Medical):      Lack of Transportation (Non-Medical): Physical Activity:     Days of Exercise per Week:     Minutes of Exercise per Session:    Stress:     Feeling of Stress :    Social Connections:     Frequency of Communication with Friends and Family:     Frequency of Social Gatherings with Friends and Family:     Attends Nondenominational Services:     Active Member of Clubs or Organizations:     Attends Club or Organization Meetings:     Marital Status:      Family History   Problem Relation Age of Onset    Alzheimer Mother     No Known Problems Father        Review of Systems  ROS is unremarkable except for ones highlighted in bold.    Constitutional: negative for fevers, chills, anorexia and weight loss  Eyes:   negative for visual disturbance and irritation  ENT:   negative for tinnitus,sore throat,nasal congestion,ear pain,hoarseness  Respiratory:  negative for cough, hemoptysis, dyspnea,wheezing  CV:   negative for chest pain, palpitations, lower extremity edema  GI:   negative for nausea, vomiting, diarrhea, abdominal pain,melena  Endo:               negative for polyuria,polydipsia,polyphagia,heat intolerance  Genitourinary: negative for frequency, dysuria and hematuria  Integumentary: negative for rash and pruritus  Hematologic:  negative for easy bruising and gum/nose bleeding  Musculoskel: negative for myalgias, arthralgias, back pain, muscle weakness, joint pain  Neurological:  negative for headaches, dizziness, vertigo, memory problems and gait   Behavl/Psych: negative for feelings of anxiety, depression, mood changes  ROS otherwise negative     Objective:  Visit Vitals  /78   Pulse 92   Temp 98.3 °F (36.8 °C) (Oral)   Resp 18   Ht 5' 3\" (1.6 m)   Wt 142 lb 12.8 oz (64.8 kg)   SpO2 98%   BMI 25.30 kg/m²     Physical Exam:   General appearance - alert, well appearing, and in no distress  Mental status - alert, oriented to person, place, and time  EYE-SHAINA, EOMI, fundi normal, corneas normal, no foreign bodies  ENT-ENT exam normal, no neck nodes or sinus tenderness  Nose - normal and patent, no erythema, discharge or polyps  Mouth - mucous membranes moist, pharynx normal without lesions  Neck - supple, no significant adenopathy   Chest - clear to auscultation, no wheezes, rales or rhonchi, symmetric air entry   Heart - normal rate, regular rhythm, normal S1, S2, no murmurs, rubs, clicks or gallops   Abdomen - soft, nontender, nondistended, no masses or organomegaly  Lymph- no adenopathy palpable  Ext-peripheral pulses normal, no pedal edema, no clubbing or cyanosis  Skin-Warm and dry. no hyperpigmentation, vitiligo, or suspicious lesions  Neuro -alert, oriented, normal speech, no focal findings or movement disorder noted  Musculoskeletal- FROM, no bony abnormalities, no point tenderness    Left Foot: Inspection: normal.  Pulse DP: 2+ (normal). Filament test: normal sensation with micro filament. Vibratory sensation: normal.  Right Foot: Inspection: normal.  Pulse DP: 2+ (normal). Filament test: normal sensation with micro filament.   Vibratory sensation: normal.      Lab Review:  Results for orders placed or performed in visit on 06/28/21   CBC W/O DIFF   Result Value Ref Range    WBC 4.6 3.6 - 11.0 K/uL    RBC 3.91 3.80 - 5.20 M/uL    HGB 10.6 (L) 11.5 - 16.0 g/dL    HCT 34.9 (L) 35.0 - 47.0 %    MCV 89.3 80.0 - 99.0 FL    MCH 27.1 26.0 - 34.0 PG    MCHC 30.4 30.0 - 36.5 g/dL    RDW 14.0 11.5 - 14.5 %    PLATELET 016 394 - 048 K/uL    MPV 11.0 8.9 - 12.9 FL    NRBC 0.0 0  WBC    ABSOLUTE NRBC 0.00 0.00 - 5.08 K/uL   METABOLIC PANEL, COMPREHENSIVE   Result Value Ref Range    Sodium 141 136 - 145 mmol/L    Potassium 3.5 3.5 - 5.1 mmol/L    Chloride 108 97 - 108 mmol/L    CO2 28 21 - 32 mmol/L    Anion gap 5 5 - 15 mmol/L    Glucose 174 (H) 65 - 100 mg/dL    BUN 12 6 - 20 MG/DL    Creatinine 0.47 (L) 0.55 - 1.02 MG/DL    BUN/Creatinine ratio 26 (H) 12 - 20      GFR est AA >60 >60 ml/min/1.73m2    GFR est non-AA >60 >60 ml/min/1.73m2    Calcium 9.7 8.5 - 10.1 MG/DL    Bilirubin, total 0.5 0.2 - 1.0 MG/DL    ALT (SGPT) 22 12 - 78 U/L    AST (SGOT) 18 15 - 37 U/L    Alk. phosphatase 131 (H) 45 - 117 U/L    Protein, total 7.1 6.4 - 8.2 g/dL    Albumin 3.6 3.5 - 5.0 g/dL    Globulin 3.5 2.0 - 4.0 g/dL    A-G Ratio 1.0 (L) 1.1 - 2.2     HEMOGLOBIN A1C WITH EAG   Result Value Ref Range    Hemoglobin A1c 7.7 (H) 4.0 - 5.6 %    Est. average glucose 174 mg/dL   LIPID PANEL   Result Value Ref Range    Cholesterol, total 240 (H) <200 MG/DL    Triglyceride 111 <150 MG/DL    HDL Cholesterol 49 MG/DL    LDL, calculated 168.8 (H) 0 - 100 MG/DL    VLDL, calculated 22.2 MG/DL    CHOL/HDL Ratio 4.9 0.0 - 5.0     VITAMIN D, 25 HYDROXY   Result Value Ref Range    Vitamin D 25-Hydroxy 32.2 30 - 100 ng/mL        Documenation Review:    Assessment/Plan:    Diagnoses and all orders for this visit:    1. Uterine leiomyoma, unspecified location    2. Inguinal lymphadenopathy    3. Well woman exam (no gynecological exam)  -     AMB POC EKG ROUTINE W/ 12 LEADS, INTER & REP    4. Essential hypertension  -     CBC W/O DIFF; Future  -     METABOLIC PANEL, COMPREHENSIVE; Future  -     AMB POC EKG ROUTINE W/ 12 LEADS, INTER & REP    5. Mixed hyperlipidemia  -     LIPID PANEL; Future  -     atorvastatin (LIPITOR) 20 mg tablet; Take 1 tablet by mouth once daily for 90 days    6. Type 2 diabetes mellitus without complication, without long-term current use of insulin (HCC)  -     MICROALBUMIN, UR, RAND W/ MICROALB/CREAT RATIO; Future  -     HEMOGLOBIN A1C WITH EAG; Future  -     metFORMIN (GLUCOPHAGE) 500 mg tablet; Take 2 Tablets by mouth two (2) times daily (with meals) for 90 days. Indications: type 2 diabetes mellitus  -     glipiZIDE (GLUCOTROL) 5 mg tablet; Take 0.5 Tablets by mouth two (2) times a day for 90 days. Indications: type 2 diabetes mellitus  -      DIABETES FOOT EXAM    7. Vitamin D deficiency  -     VITAMIN D, 25 HYDROXY; Future    8.  Post-menopausal  -     AMB POC EKG ROUTINE W/ 12 LEADS, INTER & REP    9. Breast cancer screening by mammogram    10. Chronic fatigue  -     TSH 3RD GENERATION; Future    11. History of colon polyps    12. Pre-operative clearance  -     AMB POC EKG ROUTINE W/ 12 LEADS, INTER & REP        Patient also requested a preop examination since she has a cataract surgery of both eyes. Reports she has the right eye on the 13th and left on the 27th October. At [facility]. Will get CBC CMP today and will need to fax to Christiane Raygoza. Given a history of hypertension, diabetes and hyperlipidemia will get EKG today. (12-lead EKG personally reviewednormal sinus rhythm). She denies cardiopulmonary symptoms. She understands not to take Metformin and glipizide the day of the surgery. She will take only half of her normal insulin dose of insulin which is 5 units. Patient is up-to-date on health screening. Follows up with [facility]. Reviewed recent CT abdomen pelvis with patient. Reports she is aware of a history of fibroids since a long time. Recommended her to follow-up with a gynecologist.  She denies any postmenopausal bleed. She declines all vaccinations. Understands the risk. Continue current meds. I will call with lab results and make further recommendations or adjustments if necessary. Significant time spent on counseling patient on the need to be compliant with diabetic medications, including risks of damage to renal, cardiac and nervous systems. ICD-10-CM ICD-9-CM    1. Uterine leiomyoma, unspecified location  D25.9 218.9    2. Inguinal lymphadenopathy  R59.0 785.6    3. Well woman exam (no gynecological exam)  Z00.00 V70.0 AMB POC EKG ROUTINE W/ 12 LEADS, INTER & REP   4. Essential hypertension  I10 401.9 CBC W/O DIFF      METABOLIC PANEL, COMPREHENSIVE      AMB POC EKG ROUTINE W/ 12 LEADS, INTER & REP      METABOLIC PANEL, COMPREHENSIVE      CBC W/O DIFF   5.  Mixed hyperlipidemia  E78.2 272.2 LIPID PANEL      atorvastatin (LIPITOR) 20 mg tablet      LIPID PANEL   6. Type 2 diabetes mellitus without complication, without long-term current use of insulin (HCC)  E11.9 250.00 MICROALBUMIN, UR, RAND W/ MICROALB/CREAT RATIO      HEMOGLOBIN A1C WITH EAG      metFORMIN (GLUCOPHAGE) 500 mg tablet      glipiZIDE (GLUCOTROL) 5 mg tablet      HM DIABETES FOOT EXAM      HEMOGLOBIN A1C WITH EAG      MICROALBUMIN, UR, RAND W/ MICROALB/CREAT RATIO   7. Vitamin D deficiency  E55.9 268.9 VITAMIN D, 25 HYDROXY      VITAMIN D, 25 HYDROXY   8. Post-menopausal  Z78.0 V49.81 AMB POC EKG ROUTINE W/ 12 LEADS, INTER & REP   9. Breast cancer screening by mammogram  Z12.31 V76.12    10. Chronic fatigue  R53.82 780.79 TSH 3RD GENERATION      TSH 3RD GENERATION   11. History of colon polyps  Z86.010 V12.72    12. Pre-operative clearance  Z01.818 V72.84 AMB POC EKG ROUTINE W/ 12 LEADS, INTER & REP         Follow-up and Dispositions    · Return in about 3 months (around 12/9/2021) for follow up. I have reviewed with the patient details of the assessment and plan and all questions were answered. Relevent patient education was performed. Verbal and/or written instructions (see AVS) provided. The most recent lab findings were reviewed with the patient. Plan was discussed with patient who verbally expressed understanding. An After Visit Summary was printed and given to the patient.     Lynn Mandel MD

## 2021-09-13 NOTE — PROGRESS NOTES
A1c increased to 8.9. She needs better diabetic control. Recommend increasing Lantus to 12 units nightly. Continue Metformin at current dose. Increase glipizide to 1 tablet 2 times in a day instead of half a tablet. Urine is positive for microalbuminuria. I started patient on low-dose of lisinopril for renal protection however she declined. She will be a good candidate for Farxiga (diabetic, proteinuria). Patient is concerned about the cost and will check with insurance.

## 2021-12-29 ENCOUNTER — TELEPHONE (OUTPATIENT)
Dept: PHARMACY | Age: 66
End: 2021-12-29

## 2021-12-29 NOTE — TELEPHONE ENCOUNTER
CLINICAL PHARMACY: ADHERENCE REVIEW  Identified care gap per Aniya; fills at [facility]: Statin adherence    Last Visit: 09/09/2021    Patient identified as LIS = 2, therefore patient may be able to receive a 90-day supply for the same cost as a 30-day supply      Patient not found in [facility] [place]    [address] [facility] [place]    Per Insurance Records through [employer] (2020 [place] = n/a%; YTD [facility] = filled only once): Atorvastatin 20mg last filled on 09/09/2021 for 90 day supply. Next refill due: 12/08/2021    Per [address]:   Atorvastatin 20mg last picked up on 09/09/2021 for 90 day supply. 0 refills remaining. Billed through [facility] Value Date/Time    Cholesterol, total 154 09/09/2021 10:57 AM    HDL Cholesterol 49 09/09/2021 10:57 AM    LDL, calculated 86.6 09/09/2021 10:57 AM    VLDL, calculated 18.4 09/09/2021 10:57 AM    Triglyceride 92 09/09/2021 10:57 AM    CHOL/HDL Ratio 3.1 09/09/2021 10:57 AM     ALT (SGPT)   Date Value Ref Range Status   09/09/2021 24 12 - 78 U/L Final     AST (SGOT)   Date Value Ref Range Status   09/09/2021 19 15 - 37 U/L Final     The 10-year ASCVD risk score (Holden Kothari, et al., 2013) is: 17.4%    Values used to calculate the score:      Age: 78 years      Sex: Female      Is Non- : Yes      Diabetic: Yes      Tobacco smoker: No      Systolic Blood Pressure: 256 mmHg      Is BP treated: No      HDL Cholesterol: 49 MG/DL      Total Cholesterol: 154 MG/DL     PLAN  The following are interventions that have been identified:  - Patient needs refills for Atorvastatin 20mg    No patient out reach planned at this time. Pharmacy to fax over refill request to [facility]. Will outreach once new script is received for process and filling. No future appointments.     Karlee Kingston  Direct: [phone]  Department, toll free:[phone], Option 2

## 2021-12-29 NOTE — LETTER
[address]  [address] [facility], [address] [facility]   [address]  [address] [facility]           01/03/22     Dear Liliane Ritter,      We tried to reach you recently regarding your Atorvastatin 20mg , but were unable to reach you on the telephone. It appears that this medication has not been filled at proper times. We are worried you might be missing doses or not taking it as directed. It is important that you take your medications regularly and try not to miss a single dose. We have on file that you are currently taking Atorvastatin 20mg once daily. If you are no longer taking it or taking it differently than above, please call us at the number below so that we can discuss this and update your medication profile.     Some ways to help you remember to take and refill your medications are to:  · Use a pill box, set an alarm, and/or keep your medication near something that you do every day  · Fill a 3-month supply of your prescription at a time to save you time and trips to the pharmacy  if you would like assistance in switching your prescriptions to a 3-month supply, please contact us  · Ask your pharmacy if they participate in [facility]", a program where you can  all of your medications on the same day each month  · Ask your pharmacy if you can be set up with automatic refill, where they will automatically refill your prescription when it is due and let you know it's ready to     Sincerely,     [address]  Phone: [phone], Option 2

## 2022-01-03 NOTE — TELEPHONE ENCOUNTER
Called patient to determine if she had supply on hand as pharmacy is still waiting to hear back from MD for new Rx. Left  for call back and sending letter for outreach.      For Pharmacy [address] in place: No   Recommendation Provided To: Patient/Caregiver: 1 via Telephone   Intervention Detail: Adherence Monitorin   Gap Closed?: No   Intervention Accepted By: Patient/Caregiver: 0   Time Spent (min): 20  '

## 2022-01-04 DIAGNOSIS — E78.2 MIXED HYPERLIPIDEMIA: ICD-10-CM

## 2022-01-04 RX ORDER — ATORVASTATIN CALCIUM 20 MG/1
TABLET, FILM COATED ORAL
Qty: 90 TABLET | Refills: 0 | Status: SHIPPED | OUTPATIENT
Start: 2022-01-04 | End: 2022-04-11

## 2022-01-04 NOTE — TELEPHONE ENCOUNTER
Requested Prescriptions     Pending Prescriptions Disp Refills    atorvastatin (LIPITOR) 20 mg tablet 90 Tablet 0     Sig: Take 1 tablet by mouth once daily for 90 days       Last Refill: 9/9/21  Last Appointment:9/9/21

## 2022-02-11 ENCOUNTER — OFFICE VISIT (OUTPATIENT)
Dept: INTERNAL MEDICINE CLINIC | Age: 67
End: 2022-02-11
Payer: MEDICARE

## 2022-02-11 VITALS
DIASTOLIC BLOOD PRESSURE: 84 MMHG | WEIGHT: 140.6 LBS | HEIGHT: 63 IN | SYSTOLIC BLOOD PRESSURE: 132 MMHG | HEART RATE: 96 BPM | RESPIRATION RATE: 16 BRPM | OXYGEN SATURATION: 99 % | TEMPERATURE: 97.7 F | BODY MASS INDEX: 24.91 KG/M2

## 2022-02-11 DIAGNOSIS — E11.65 TYPE 2 DIABETES MELLITUS WITH HYPERGLYCEMIA, WITH LONG-TERM CURRENT USE OF INSULIN (HCC): Primary | ICD-10-CM

## 2022-02-11 DIAGNOSIS — E11.69 MIXED DIABETIC HYPERLIPIDEMIA ASSOCIATED WITH TYPE 2 DIABETES MELLITUS (HCC): ICD-10-CM

## 2022-02-11 DIAGNOSIS — Z79.899 ON STATIN THERAPY: ICD-10-CM

## 2022-02-11 DIAGNOSIS — E03.9 ACQUIRED HYPOTHYROIDISM: ICD-10-CM

## 2022-02-11 DIAGNOSIS — E55.9 VITAMIN D DEFICIENCY: ICD-10-CM

## 2022-02-11 DIAGNOSIS — E78.2 MIXED DIABETIC HYPERLIPIDEMIA ASSOCIATED WITH TYPE 2 DIABETES MELLITUS (HCC): ICD-10-CM

## 2022-02-11 DIAGNOSIS — Z79.4 TYPE 2 DIABETES MELLITUS WITH HYPERGLYCEMIA, WITH LONG-TERM CURRENT USE OF INSULIN (HCC): Primary | ICD-10-CM

## 2022-02-11 DIAGNOSIS — R79.89 ELEVATED TSH: ICD-10-CM

## 2022-02-11 DIAGNOSIS — D64.9 ANEMIA, UNSPECIFIED TYPE: ICD-10-CM

## 2022-02-11 PROBLEM — E11.9 TYPE 2 DIABETES MELLITUS (HCC): Status: ACTIVE | Noted: 2022-02-11

## 2022-02-11 PROCEDURE — G8427 DOCREV CUR MEDS BY ELIG CLIN: HCPCS | Performed by: NURSE PRACTITIONER

## 2022-02-11 PROCEDURE — 1090F PRES/ABSN URINE INCON ASSESS: CPT | Performed by: NURSE PRACTITIONER

## 2022-02-11 PROCEDURE — G9899 SCRN MAM PERF RSLTS DOC: HCPCS | Performed by: NURSE PRACTITIONER

## 2022-02-11 PROCEDURE — 99214 OFFICE O/P EST MOD 30 MIN: CPT | Performed by: NURSE PRACTITIONER

## 2022-02-11 PROCEDURE — G8536 NO DOC ELDER MAL SCRN: HCPCS | Performed by: NURSE PRACTITIONER

## 2022-02-11 PROCEDURE — G8432 DEP SCR NOT DOC, RNG: HCPCS | Performed by: NURSE PRACTITIONER

## 2022-02-11 PROCEDURE — 3046F HEMOGLOBIN A1C LEVEL >9.0%: CPT | Performed by: NURSE PRACTITIONER

## 2022-02-11 PROCEDURE — 2022F DILAT RTA XM EVC RTNOPTHY: CPT | Performed by: NURSE PRACTITIONER

## 2022-02-11 PROCEDURE — G8420 CALC BMI NORM PARAMETERS: HCPCS | Performed by: NURSE PRACTITIONER

## 2022-02-11 PROCEDURE — 3017F COLORECTAL CA SCREEN DOC REV: CPT | Performed by: NURSE PRACTITIONER

## 2022-02-11 PROCEDURE — G8400 PT W/DXA NO RESULTS DOC: HCPCS | Performed by: NURSE PRACTITIONER

## 2022-02-11 PROCEDURE — 1101F PT FALLS ASSESS-DOCD LE1/YR: CPT | Performed by: NURSE PRACTITIONER

## 2022-02-11 RX ORDER — METFORMIN HYDROCHLORIDE 500 MG/1
1000 TABLET ORAL 2 TIMES DAILY WITH MEALS
COMMUNITY
End: 2022-04-14 | Stop reason: SDUPTHER

## 2022-02-11 RX ORDER — GLIPIZIDE 5 MG/1
2.5 TABLET ORAL 2 TIMES DAILY
COMMUNITY

## 2022-02-11 NOTE — PROGRESS NOTES
Lulu Howard is a 78 y.o. female     Chief Complaint   Patient presents with    New Patient     establishing care       Visit Vitals  /84 (BP 1 Location: Left arm, BP Patient Position: Sitting, BP Cuff Size: Adult)   Pulse 96   Temp 97.7 °F (36.5 °C) (Temporal)   Resp 16   Ht 5' 3\" (1.6 m)   Wt 140 lb 9.6 oz (63.8 kg)   SpO2 99%   BMI 24.91 kg/m²       Health Maintenance Due   Topic Date Due    Hepatitis C Screening  Never done    COVID-19 Vaccine (1) Never done    Eye Exam Retinal or Dilated  Never done    DTaP/Tdap/Td series (1 - Tdap) Never done    Shingrix Vaccine Age 50> (1 of 2) Never done    Breast Cancer Screen Mammogram  Never done    Pneumococcal 65+ years (1 of 1 - PPSV23) Never done    Flu Vaccine (1) Never done       1. Have you been to the ER, urgent care clinic since your last visit? Hospitalized since your last visit? No     2. Have you seen or consulted any other health care providers outside of the [address] [place] [address] since your last visit? Include any pap smears or colon screening.  No

## 2022-02-11 NOTE — PROGRESS NOTES
Chief Complaint   Patient presents with    New Patient     establishing care       SUBJECTIVE:    Tiara Banegas is a 78 y.o. 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 She currently has a Dexcom 6 unit which she is using to manage and monitor her blood sugars. She wears it continuously. She denies any hypoglycemic episodes. Her last hemoglobin A1c was approximately 8.9%. She states her morning blood sugars typically run between 120-140 mg/dL. She continues to take atorvastatin 20 mg nightly for management of her cholesterol and is tolerating this well. She denies any associated myalgias or joint pain. She denies any chest pain, chest pressure, shortness of breath, headaches, dizziness, blurred vision, palpitations, or syncope episodes. In review of her previous lab prior to arrival, the patient was found to be low and her vitamin D, had an elevated TSH, and decrease in her hemoglobin count. We will likely recheck this again soon. Current Outpatient Medications   Medication Sig Dispense Refill    atorvastatin (LIPITOR) 20 mg tablet Take 1 tablet by mouth once daily for 90 days 90 Tablet 0    insulin glargine (LANTUS,BASAGLAR) 100 unit/mL (3 mL) inpn 10 Units by SubCUTAneous route nightly. Indications: type 2 diabetes mellitus 1 Adjustable Dose Pre-filled Pen Syringe 3    Blood-Glucose Meter monitoring kit As directed (Patient taking differently: Dexacom meter) 1 Kit 0    Insulin Needles, Disposable, 31 gauge x 5/16\" ndle Daily use 1 Package 11    multivitamin (ONE A DAY) tablet Take 1 Tab by mouth daily.       Accu-Chek Fastclix Lancet Drum misc USE   TO CHECK GLUCOSE THREE TIMES DAILY (Patient not taking: Reported on 2/11/2022) 102 Each 0    Accu-Chek Guide test strips strip USE 1 STRIP TO CHECK GLUCOSE THREE TIMES DAILY (Patient not taking: Reported on 2/11/2022) 100 Strip 0     Past Medical History:   Diagnosis Date    Diabetes ([facility] [address]     Hypercholesterolemia      Past Surgical History:   Procedure Laterality Date    COLONOSCOPY N/A 9/16/2020    [facility]    HX CATARACT REMOVAL Bilateral 10/2021    HX COLONOSCOPY      HX OOPHORECTOMY Left      No Known Allergies    REVIEW OF SYSTEMS:                                        POSITIVE= bold text  Negative = regular text    General:                     fever, chills, sweats, generalized weakness, weight loss/gain,                                       loss of appetite   Eyes:                           blurred vision, eye pain, loss of vision, double vision  ENT:                            rhinorrhea, pharyngitis   Respiratory:               cough, sputum production, SOB, HERNANDEZ, wheezing, pleuritic pain   Cardiology:                chest pain, palpitations, orthopnea, PND, edema, syncope   Gastrointestinal:       abdominal pain , N/V, diarrhea, dysphagia, constipation, bleeding   Genitourinary:           frequency, urgency, dysuria, hematuria, incontinence   Muskuloskeletal :      arthralgia, myalgia, back pain  Hematology:              easy bruising, nose or gum bleeding, lymphadenopathy   Dermatological:         rash, ulceration, pruritis, color change / jaundice  Endocrine:                 hot flashes or polydipsia   Neurological:             headache, dizziness, confusion, focal weakness, paresthesia,                                      Speech difficulties, memory loss, gait difficulty  Psychological:          Feelings of anxiety, depression, agitation        Social History     Socioeconomic History    Marital status:    Tobacco Use    Smoking status: Never Smoker    Smokeless tobacco: Never Used   Vaping Use    Vaping Use: Never used   Substance and Sexual Activity    Alcohol use: Never    Drug use: Never    Sexual activity: Not Currently Family History   Problem Relation Age of Onset    Alzheimer's Disease Mother     No Known Problems Father        OBJECTIVE:     Visit Vitals  /84 (BP 1 Location: Left arm, BP Patient Position: Sitting, BP Cuff Size: Adult)   Pulse 96   Temp 97.7 °F (36.5 °C) (Temporal)   Resp 16   Ht 5' 3\" (1.6 m)   Wt 140 lb 9.6 oz (63.8 kg)   SpO2 99%   BMI 24.91 kg/m²       Constitutional: She appears well nourished, of stated age, and dressed appropriately. Eyes: Sclera anicteric, PERRLA, EOMI  Neck: Supple without lymphadenopathy. Thyroid normal, No JVD or bruits  Respiratory: Clear to ascultation X5, normal inspiratory effort, no adventitious breath sounds. Cardiovascular: Regular rate and rhythm, no significant murmurs, rubs or gallops, PMI not displaced, No thrills, no peripheral edema  Neuro: Non-focal exam, A & O X 3.   Psychiatric: Appropriate affect and demeanor, pleasant and cooperative. Patient's thought content and thought processing appear to be within normal limits. ASSESSMENT/PLAN:     ICD-10-CM ICD-9-CM    1. Type 2 diabetes mellitus with hyperglycemia, with long-term current use of insulin ([facility])  E11.65 250.00 HEMOGLOBIN A1C WITH EAG    Z79.4 790.29      V58.67    2. Mixed diabetic hyperlipidemia associated with type 2 diabetes mellitus ([facility])  E11.69 250.80 LIPID PANEL    E78.2 272.2    3. Vitamin D deficiency  E55.9 268.9 VITAMIN D, 25 HYDROXY   4. On statin therapy  D49.009 Y00.28 METABOLIC PANEL, COMPREHENSIVE   5. Anemia, unspecified type  D64.9 285.9 CBC W/O DIFF   6. Elevated TSH  R79.89 794.5 TSH 3RD GENERATION     1: Patient to return fasting in the next 1 to 2 weeks for: CBC, CMP, lipid panel, TSH, vitamin D level, and hemoglobin A1c.  2: Patient to continue current medications for management of type 2 diabetes. Increase Lantus to 15 units nightly. Continue to monitor blood sugars closely. 3: Patient to continue atorvastatin 20 mg nightly for management of cholesterol.   4: Patient to continue all other medications as directed. 5: Patient to follow-up with me in approximately 4 months, or sooner as needed. Patient states understanding and agrees with plan. Orders Placed This Encounter    TSH 3RD GENERATION    CBC W/O DIFF    METABOLIC PANEL, COMPREHENSIVE    HEMOGLOBIN A1C WITH EAG    LIPID PANEL    VITAMIN D, 25 HYDROXY         ATTENTION:   This medical record was transcribed using an electronic medical records system. Although proofread, it may and can contain electronic and spelling errors. Other human spelling and other errors may be present. Corrections may be executed at a later time. Please feel free to contact us for any clarifications as needed. Follow-up and Dispositions    · Return in about 4 months (around 6/11/2022). Signed,  uSha Granados.  Syd Isaac, MSN APRN FNP-BC

## 2022-02-22 ENCOUNTER — APPOINTMENT (OUTPATIENT)
Dept: INTERNAL MEDICINE CLINIC | Age: 67
End: 2022-02-22

## 2022-02-22 DIAGNOSIS — E78.2 MIXED DIABETIC HYPERLIPIDEMIA ASSOCIATED WITH TYPE 2 DIABETES MELLITUS (HCC): ICD-10-CM

## 2022-02-22 DIAGNOSIS — E11.65 TYPE 2 DIABETES MELLITUS WITH HYPERGLYCEMIA, WITH LONG-TERM CURRENT USE OF INSULIN (HCC): ICD-10-CM

## 2022-02-22 DIAGNOSIS — E55.9 VITAMIN D DEFICIENCY: ICD-10-CM

## 2022-02-22 DIAGNOSIS — Z79.4 TYPE 2 DIABETES MELLITUS WITH HYPERGLYCEMIA, WITH LONG-TERM CURRENT USE OF INSULIN (HCC): ICD-10-CM

## 2022-02-22 DIAGNOSIS — E11.69 MIXED DIABETIC HYPERLIPIDEMIA ASSOCIATED WITH TYPE 2 DIABETES MELLITUS (HCC): ICD-10-CM

## 2022-02-22 DIAGNOSIS — Z79.899 ON STATIN THERAPY: ICD-10-CM

## 2022-02-22 DIAGNOSIS — R79.89 ELEVATED TSH: ICD-10-CM

## 2022-02-22 DIAGNOSIS — D64.9 ANEMIA, UNSPECIFIED TYPE: ICD-10-CM

## 2022-02-22 LAB
25(OH)D3 SERPL-MCNC: 30.7 NG/ML (ref 30–100)
ALBUMIN SERPL-MCNC: 3.7 G/DL (ref 3.5–5)
ALBUMIN/GLOB SERPL: 0.9 {RATIO} (ref 1.1–2.2)
ALP SERPL-CCNC: 124 U/L (ref 45–117)
ALT SERPL-CCNC: 21 U/L (ref 12–78)
ANION GAP SERPL CALC-SCNC: 5 MMOL/L (ref 5–15)
AST SERPL-CCNC: 18 U/L (ref 15–37)
BILIRUB SERPL-MCNC: 0.4 MG/DL (ref 0.2–1)
BUN SERPL-MCNC: 14 MG/DL (ref 6–20)
BUN/CREAT SERPL: 23 (ref 12–20)
CALCIUM SERPL-MCNC: 9.9 MG/DL (ref 8.5–10.1)
CHLORIDE SERPL-SCNC: 108 MMOL/L (ref 97–108)
CHOLEST SERPL-MCNC: 214 MG/DL
CO2 SERPL-SCNC: 27 MMOL/L (ref 21–32)
CREAT SERPL-MCNC: 0.6 MG/DL (ref 0.55–1.02)
ERYTHROCYTE [DISTWIDTH] IN BLOOD BY AUTOMATED COUNT: 14.1 % (ref 11.5–14.5)
EST. AVERAGE GLUCOSE BLD GHB EST-MCNC: 192 MG/DL
GLOBULIN SER CALC-MCNC: 3.9 G/DL (ref 2–4)
GLUCOSE SERPL-MCNC: 139 MG/DL (ref 65–100)
HBA1C MFR BLD: 8.3 % (ref 4–5.6)
HCT VFR BLD AUTO: 34.6 % (ref 35–47)
HDLC SERPL-MCNC: 43 MG/DL
HDLC SERPL: 5 {RATIO} (ref 0–5)
HGB BLD-MCNC: 10.8 G/DL (ref 11.5–16)
LDLC SERPL CALC-MCNC: 140.6 MG/DL (ref 0–100)
MCH RBC QN AUTO: 27.1 PG (ref 26–34)
MCHC RBC AUTO-ENTMCNC: 31.2 G/DL (ref 30–36.5)
MCV RBC AUTO: 86.7 FL (ref 80–99)
NRBC # BLD: 0 K/UL (ref 0–0.01)
NRBC BLD-RTO: 0 PER 100 WBC
PLATELET # BLD AUTO: 228 K/UL (ref 150–400)
PMV BLD AUTO: 11 FL (ref 8.9–12.9)
POTASSIUM SERPL-SCNC: 3.8 MMOL/L (ref 3.5–5.1)
PROT SERPL-MCNC: 7.6 G/DL (ref 6.4–8.2)
RBC # BLD AUTO: 3.99 M/UL (ref 3.8–5.2)
SODIUM SERPL-SCNC: 140 MMOL/L (ref 136–145)
TRIGL SERPL-MCNC: 152 MG/DL (ref ?–150)
TSH SERPL DL<=0.05 MIU/L-ACNC: 5.34 UIU/ML (ref 0.36–3.74)
VLDLC SERPL CALC-MCNC: 30.4 MG/DL
WBC # BLD AUTO: 5.7 K/UL (ref 3.6–11)

## 2022-02-23 RX ORDER — LEVOTHYROXINE SODIUM 25 UG/1
25 TABLET ORAL
Qty: 90 TABLET | Refills: 0 | Status: SHIPPED | OUTPATIENT
Start: 2022-02-23 | End: 2022-04-08 | Stop reason: DRUGHIGH

## 2022-02-23 NOTE — PROGRESS NOTES
Thyroid shows worsening decline in function. We need to treat this. I will start you on levothyroxine 25 mcg to take 1 tablet each day. We will need to recheck your thyroid levels in approximately 6 weeks. Please make a lab appointment for this. Blood count shows continued mild anemia, but stable. Kidney function, liver functions, and electrolytes are acceptable. Hemoglobin A1c shows poor control of diabetes overall, and is currently at 8.3%. Goal is to get you below 7.0%. Continue current medications and follow-up with me as planned.

## 2022-03-18 PROBLEM — E11.9 TYPE 2 DIABETES MELLITUS (HCC): Status: ACTIVE | Noted: 2022-02-11

## 2022-04-07 ENCOUNTER — LAB ONLY (OUTPATIENT)
Dept: INTERNAL MEDICINE CLINIC | Age: 67
End: 2022-04-07

## 2022-04-07 DIAGNOSIS — E03.9 ACQUIRED HYPOTHYROIDISM: Primary | ICD-10-CM

## 2022-04-08 DIAGNOSIS — E03.9 ACQUIRED HYPOTHYROIDISM: Primary | ICD-10-CM

## 2022-04-08 LAB
T4 FREE SERPL-MCNC: 1.1 NG/DL (ref 0.8–1.5)
TSH SERPL DL<=0.05 MIU/L-ACNC: 3.89 UIU/ML (ref 0.36–3.74)

## 2022-04-08 RX ORDER — LEVOTHYROXINE SODIUM 50 UG/1
50 TABLET ORAL
Qty: 90 TABLET | Refills: 1 | Status: SHIPPED | OUTPATIENT
Start: 2022-04-08 | End: 2022-04-14 | Stop reason: SDUPTHER

## 2022-04-10 DIAGNOSIS — E78.2 MIXED HYPERLIPIDEMIA: ICD-10-CM

## 2022-04-11 PROBLEM — E03.9 ACQUIRED HYPOTHYROIDISM: Status: ACTIVE | Noted: 2022-04-08

## 2022-04-11 RX ORDER — ATORVASTATIN CALCIUM 20 MG/1
TABLET, FILM COATED ORAL
Qty: 90 TABLET | Refills: 0 | Status: SHIPPED | OUTPATIENT
Start: 2022-04-11 | End: 2022-04-14 | Stop reason: SDUPTHER

## 2022-04-11 NOTE — TELEPHONE ENCOUNTER
PCP: Justyn Jordan NP    Last appt: 2/22/2022  Future Appointments   Date Time Provider Yenni Feliciano   6/10/2022  1:00 PM Justyn Jordan NP [facility] AMB       Requested Prescriptions     Pending Prescriptions Disp Refills    atorvastatin (LIPITOR) 20 mg tablet [Pharmacy Med Name: Atorvastatin Calcium 20 MG Oral Tablet] 90 Tablet 0     Sig: Take 1 tablet by mouth once daily for 90 days       Prior labs and Blood pressures:  BP Readings from Last 3 Encounters:   02/11/22 132/84   09/09/21 130/78   07/19/21 132/72     Lab Results   Component Value Date/Time    Sodium 140 02/22/2022 10:05 AM    Potassium 3.8 02/22/2022 10:05 AM    Chloride 108 02/22/2022 10:05 AM    CO2 27 02/22/2022 10:05 AM    Anion gap 5 02/22/2022 10:05 AM    Glucose 139 (H) 02/22/2022 10:05 AM    BUN 14 02/22/2022 10:05 AM    Creatinine 0.60 02/22/2022 10:05 AM    BUN/Creatinine ratio 23 (H) 02/22/2022 10:05 AM    GFR est AA >60 02/22/2022 10:05 AM    GFR est non-AA >60 02/22/2022 10:05 AM    Calcium 9.9 02/22/2022 10:05 AM     Lab Results   Component Value Date/Time    Hemoglobin A1c 8.3 (H) 02/22/2022 10:05 AM     Lab Results   Component Value Date/Time    Cholesterol, total 214 (H) 02/22/2022 10:05 AM    HDL Cholesterol 43 02/22/2022 10:05 AM    LDL, calculated 140.6 (H) 02/22/2022 10:05 AM    VLDL, calculated 30.4 02/22/2022 10:05 AM    Triglyceride 152 (H) 02/22/2022 10:05 AM    CHOL/HDL Ratio 5.0 02/22/2022 10:05 AM     Lab Results   Component Value Date/Time    Vitamin D 25-Hydroxy 30.7 02/22/2022 10:05 AM       Lab Results   Component Value Date/Time    TSH 3.89 (H) 04/07/2022 11:53 AM    TSH, 3rd generation 2.71 08/12/2020 03:09 PM

## 2022-04-11 NOTE — PROGRESS NOTES
Discussed with pt. No questions. Re:  Thyroid level somewhat improved, but not yet to goal.  I am going to increase your levothyroxine to 50 mcg daily. Recheck labs in 3 months.

## 2022-04-14 DIAGNOSIS — E78.2 MIXED HYPERLIPIDEMIA: ICD-10-CM

## 2022-04-14 DIAGNOSIS — E11.9 NEW ONSET TYPE 2 DIABETES MELLITUS (HCC): ICD-10-CM

## 2022-04-14 DIAGNOSIS — E03.9 ACQUIRED HYPOTHYROIDISM: ICD-10-CM

## 2022-04-14 RX ORDER — LEVOTHYROXINE SODIUM 50 UG/1
50 TABLET ORAL
Qty: 90 TABLET | Refills: 1 | Status: SHIPPED | OUTPATIENT
Start: 2022-04-14 | End: 2022-10-11 | Stop reason: SDUPTHER

## 2022-04-14 RX ORDER — INSULIN GLARGINE 100 [IU]/ML
10 INJECTION, SOLUTION SUBCUTANEOUS
Qty: 1 ADJUSTABLE DOSE PRE-FILLED PEN SYRINGE | Refills: 3 | Status: SHIPPED | OUTPATIENT
Start: 2022-04-14

## 2022-04-14 RX ORDER — ATORVASTATIN CALCIUM 20 MG/1
20 TABLET, FILM COATED ORAL DAILY
Qty: 90 TABLET | Refills: 0 | Status: SHIPPED | OUTPATIENT
Start: 2022-04-14 | End: 2022-06-23 | Stop reason: DRUGHIGH

## 2022-04-14 RX ORDER — METFORMIN HYDROCHLORIDE 500 MG/1
1000 TABLET ORAL 2 TIMES DAILY WITH MEALS
Qty: 360 TABLET | Refills: 1 | Status: SHIPPED | OUTPATIENT
Start: 2022-04-14

## 2022-04-14 NOTE — TELEPHONE ENCOUNTER
Requested Prescriptions     Pending Prescriptions Disp Refills    atorvastatin (LIPITOR) 20 mg tablet 90 Tablet 0    metFORMIN (GLUCOPHAGE) 500 mg tablet  1     Sig: Take 2 Tablets by mouth two (2) times daily (with meals).  levothyroxine (SYNTHROID) 50 mcg tablet 90 Tablet 1     Sig: Take 1 Tablet by mouth Daily (before breakfast). Indications: a condition with low thyroid hormone levels    insulin glargine (LANTUS,BASAGLAR) 100 unit/mL (3 mL) inpn 1 Adjustable Dose Pre-filled Pen Syringe 3     Sig: 10 Units by SubCUTAneous route nightly.  Indications: type 2 diabetes mellitus       Last Refill: 2/11/22  [facility] [facility]

## 2022-04-14 NOTE — TELEPHONE ENCOUNTER
PCP: Nav Holden NP    Last appt: 2/22/2022  Future Appointments   Date Time Provider Yenni Feliciano   6/10/2022  1:00 PM Nav Holden NP [facility] AMB       Requested Prescriptions     Pending Prescriptions Disp Refills    atorvastatin (LIPITOR) 20 mg tablet 90 Tablet 0    metFORMIN (GLUCOPHAGE) 500 mg tablet  1     Sig: Take 2 Tablets by mouth two (2) times daily (with meals).  levothyroxine (SYNTHROID) 50 mcg tablet 90 Tablet 1     Sig: Take 1 Tablet by mouth Daily (before breakfast). Indications: a condition with low thyroid hormone levels    insulin glargine (LANTUS,BASAGLAR) 100 unit/mL (3 mL) inpn 1 Adjustable Dose Pre-filled Pen Syringe 3     Sig: 10 Units by SubCUTAneous route nightly.  Indications: type 2 diabetes mellitus         Other Comments:

## 2022-06-10 ENCOUNTER — OFFICE VISIT (OUTPATIENT)
Dept: INTERNAL MEDICINE CLINIC | Age: 67
End: 2022-06-10
Payer: MEDICARE

## 2022-06-10 VITALS
HEART RATE: 87 BPM | TEMPERATURE: 98.3 F | RESPIRATION RATE: 16 BRPM | WEIGHT: 145.6 LBS | OXYGEN SATURATION: 99 % | DIASTOLIC BLOOD PRESSURE: 76 MMHG | HEIGHT: 63 IN | SYSTOLIC BLOOD PRESSURE: 134 MMHG | BODY MASS INDEX: 25.8 KG/M2

## 2022-06-10 DIAGNOSIS — E11.65 TYPE 2 DIABETES MELLITUS WITH HYPERGLYCEMIA, WITH LONG-TERM CURRENT USE OF INSULIN (HCC): Primary | ICD-10-CM

## 2022-06-10 DIAGNOSIS — Z79.4 TYPE 2 DIABETES MELLITUS WITH HYPERGLYCEMIA, WITH LONG-TERM CURRENT USE OF INSULIN (HCC): Primary | ICD-10-CM

## 2022-06-10 DIAGNOSIS — E66.3 OVERWEIGHT (BMI 25.0-29.9): ICD-10-CM

## 2022-06-10 DIAGNOSIS — E03.9 ACQUIRED HYPOTHYROIDISM: ICD-10-CM

## 2022-06-10 DIAGNOSIS — E78.2 MIXED DIABETIC HYPERLIPIDEMIA ASSOCIATED WITH TYPE 2 DIABETES MELLITUS (HCC): ICD-10-CM

## 2022-06-10 DIAGNOSIS — Z79.899 ON STATIN THERAPY: ICD-10-CM

## 2022-06-10 DIAGNOSIS — E11.69 MIXED DIABETIC HYPERLIPIDEMIA ASSOCIATED WITH TYPE 2 DIABETES MELLITUS (HCC): ICD-10-CM

## 2022-06-10 PROCEDURE — 1123F ACP DISCUSS/DSCN MKR DOCD: CPT | Performed by: NURSE PRACTITIONER

## 2022-06-10 PROCEDURE — G8536 NO DOC ELDER MAL SCRN: HCPCS | Performed by: NURSE PRACTITIONER

## 2022-06-10 PROCEDURE — G8400 PT W/DXA NO RESULTS DOC: HCPCS | Performed by: NURSE PRACTITIONER

## 2022-06-10 PROCEDURE — 3017F COLORECTAL CA SCREEN DOC REV: CPT | Performed by: NURSE PRACTITIONER

## 2022-06-10 PROCEDURE — 3052F HG A1C>EQUAL 8.0%<EQUAL 9.0%: CPT | Performed by: NURSE PRACTITIONER

## 2022-06-10 PROCEDURE — 1090F PRES/ABSN URINE INCON ASSESS: CPT | Performed by: NURSE PRACTITIONER

## 2022-06-10 PROCEDURE — G8427 DOCREV CUR MEDS BY ELIG CLIN: HCPCS | Performed by: NURSE PRACTITIONER

## 2022-06-10 PROCEDURE — G8417 CALC BMI ABV UP PARAM F/U: HCPCS | Performed by: NURSE PRACTITIONER

## 2022-06-10 PROCEDURE — 1101F PT FALLS ASSESS-DOCD LE1/YR: CPT | Performed by: NURSE PRACTITIONER

## 2022-06-10 PROCEDURE — 2022F DILAT RTA XM EVC RTNOPTHY: CPT | Performed by: NURSE PRACTITIONER

## 2022-06-10 PROCEDURE — G8432 DEP SCR NOT DOC, RNG: HCPCS | Performed by: NURSE PRACTITIONER

## 2022-06-10 PROCEDURE — G9899 SCRN MAM PERF RSLTS DOC: HCPCS | Performed by: NURSE PRACTITIONER

## 2022-06-10 PROCEDURE — 99214 OFFICE O/P EST MOD 30 MIN: CPT | Performed by: NURSE PRACTITIONER

## 2022-06-10 NOTE — PROGRESS NOTES
Katalina Hansen is a 78 y.o. female     No chief complaint on file. Visit Vitals  /76 (BP 1 Location: Left arm, BP Patient Position: Sitting, BP Cuff Size: Adult)   Pulse 87   Temp 98.3 °F (36.8 °C) (Oral)   Resp 16   Ht 5' 3\" (1.6 m)   Wt 145 lb 9.6 oz (66 kg)   SpO2 99%   BMI 25.79 kg/m²       Health Maintenance Due   Topic Date Due    COVID-19 Vaccine (1) Never done    Pneumococcal 65+ years (1 - PCV) Never done    Eye Exam Retinal or Dilated  Never done    DTaP/Tdap/Td series (1 - Tdap) Never done    Shingrix Vaccine Age 50> (1 of 2) Never done         1. \"Have you been to the ER, urgent care clinic since your last visit? Hospitalized since your last visit? \" No    2. \"Have you seen or consulted any other health care providers outside of the [address] [place] [address] since your last visit? \" No     3. For patients aged 39-70: Has the patient had a colonoscopy / FIT/ Cologuard? Yes - no Care Gap present      If the patient is female:    4. For patients aged 41-77: Has the patient had a mammogram within the past 2 years? Yes - no Care Gap present      5. For patients aged 21-65: Has the patient had a pap smear?  NA - based on age or sex

## 2022-06-10 NOTE — PROGRESS NOTES
CC: Follow up    SUBJECTIVE:    Jacque Harding is a 78 y.o. female who is here today for a follow up appointment regarding her type 2 diabetes with long-term insulin use, mixed hyperlipidemia, and hypothyroidism. The patient is currently taking medication as prescribed for management of her type 2 diabetes. She is currently using a Dexcom 6 blood glucose monitoring system and checks her blood sugars routinely. She states her morning blood sugars typically range in the 120s to 130s on average. She denies any hypoglycemic episodes. She is taking her medications as prescribed and denies any adverse side effects. She states she is trying to be watchful of her diet, and occasionally does some walking for exercise. She continues to take levothyroxine daily for management of her hypothyroidism. She is tolerating this well. She denies any hot/cold intolerance. She continues to take atorvastatin 20 mg daily for management of her hyperlipidemia. She denies any chest pain, chest pressure, shortness of breath, headaches, dizziness, blurred vision, palpitations, or syncope episodes. Current Outpatient Medications   Medication Sig Dispense Refill    atorvastatin (LIPITOR) 20 mg tablet Take 1 Tablet by mouth daily. 90 Tablet 0    metFORMIN (GLUCOPHAGE) 500 mg tablet Take 2 Tablets by mouth two (2) times daily (with meals). 360 Tablet 1    levothyroxine (SYNTHROID) 50 mcg tablet Take 1 Tablet by mouth Daily (before breakfast). Indications: a condition with low thyroid hormone levels 90 Tablet 1    insulin glargine (LANTUS,BASAGLAR) 100 unit/mL (3 mL) inpn 10 Units by SubCUTAneous route nightly. Indications: type 2 diabetes mellitus 1 Adjustable Dose Pre-filled Pen Syringe 3    glipiZIDE (GLUCOTROL) 5 mg tablet Take 2.5 mg by mouth two (2) times a day.  Insulin Needles, Disposable, 31 gauge x 5/16\" ndle Daily use 1 Package 11    multivitamin (ONE A DAY) tablet Take 1 Tab by mouth daily.        Past Medical History:   Diagnosis Date    Diabetes ([facility] [address]     Hypercholesterolemia      Past Surgical History:   Procedure Laterality Date    COLONOSCOPY N/A 9/16/2020    COL[facility] performed by Tere [facility]illas [facility] at [facility]    HX CATARACT REMOVAL Bilateral 10/2021    HX COLONOSCOPY      HX OOPHORECTOMY Left      No Known Allergies    REVIEW OF SYSTEMS:                                        POSITIVE= bold text  Negative = regular text    General:                     fever, chills, sweats, generalized weakness, weight loss/gain,                                       loss of appetite   Eyes:                           blurred vision, eye pain, loss of vision, double vision  ENT:                            rhinorrhea, pharyngitis   Respiratory:               cough, sputum production, SOB, HERNANDEZ, wheezing, pleuritic pain   Cardiology:                chest pain, palpitations, orthopnea, PND, edema, syncope   Gastrointestinal:       abdominal pain , N/V, diarrhea, dysphagia, constipation, bleeding   Genitourinary:           frequency, urgency, dysuria, hematuria, incontinence   Muskuloskeletal :      arthralgia, myalgia, back pain  Hematology:              easy bruising, nose or gum bleeding, lymphadenopathy   Dermatological:         rash, ulceration, pruritis, color change / jaundice  Endocrine:                 hot flashes or polydipsia   Neurological:             headache, dizziness, confusion, focal weakness, paresthesia,                                      Speech difficulties, memory loss, gait difficulty  Psychological:          Feelings of anxiety, depression, agitation        Social History     Socioeconomic History    Marital status:    Tobacco Use    Smoking status: Never Smoker    Smokeless tobacco: Never Used   Vaping Use    Vaping Use: Never used   Substance and Sexual Activity    Alcohol use: Never    Drug use: Never    Sexual activity: Not Currently     Social Determinants of Health Financial Resource Strain: Low Risk     Difficulty of Paying Living Expenses: Not hard at all   Food Insecurity: No Food Insecurity    Worried About Running Out of Food in the Last Year: Never true    [place]hal of Food in the Last Year: Never true     Family History   Problem Relation Age of Onset    Alzheimer's Disease Mother     No Known Problems Father        OBJECTIVE:     Visit Vitals  /76 (BP 1 Location: Left arm, BP Patient Position: Sitting, BP Cuff Size: Adult)   Pulse 87   Temp 98.3 °F (36.8 °C) (Oral)   Resp 16   Ht 5' 3\" (1.6 m)   Wt 145 lb 9.6 oz (66 kg)   SpO2 99%   BMI 25.79 kg/m²       Constitutional: She appears well nourished, of stated age, and dressed appropriately. Eyes: Sclera anicteric, PERRLA, EOMI  Neck: Supple without lymphadenopathy. Thyroid normal, No JVD or bruits  Respiratory: Clear to ascultation X5, normal inspiratory effort, no adventitious breath sounds. Cardiovascular: Regular rate and rhythm, 2/6 systolic murmur, but without rubs or gallops, PMI not displaced, No thrills, no peripheral edema  Neuro: Non-focal exam, A & O X 3.   Psychiatric: Appropriate affect and demeanor, pleasant and cooperative. Patient's thought content and thought processing appear to be within normal limits. ASSESSMENT/PLAN:     ICD-10-CM ICD-9-CM    1. Type 2 diabetes mellitus with hyperglycemia, with long-term current use of insulin ([facility])  U88.71 354.13 METABOLIC PANEL, COMPREHENSIVE    Z79.4 790.29 HEMOGLOBIN A1C WITH EAG     V58.67    2. Mixed diabetic hyperlipidemia associated with type 2 diabetes mellitus (HCC)  E11.69 250.80 LIPID PANEL    E78.2 272.2    3. Acquired hypothyroidism  E03.9 244.9 TSH 3RD GENERATION      T4, FREE   4. Overweight (BMI 25.0-29. 9)  E66.3 278.02    5. On statin therapy  Z79.899 V58.69 CK     1: Patient to continue current medications for management of type 2 diabetes, mixed hyperlipidemia, and hypothyroidism.   2: Patient to return in the next 2 weeks for fasting labs including: CMP, hemoglobin A1c, lipid panel, TSH, free T4, and CK.  3: Patient to continue healthy lifestyle management avoidance of concentrated sweets and excess carbs, and regular exercise patterns. 4: Patient to follow-up with me in approximately 4 months, or sooner as needed. Patient states understanding and agrees with plan. Orders Placed This Encounter    METABOLIC PANEL, COMPREHENSIVE    HEMOGLOBIN A1C WITH EAG    LIPID PANEL    TSH 3RD GENERATION    T4, FREE    CK         ATTENTION:   This medical record was transcribed using an electronic medical records system. Although proofread, it may and can contain electronic and spelling errors. Other human spelling and other errors may be present. Corrections may be executed at a later time. Please feel free to contact us for any clarifications as needed. Follow-up and Dispositions    · Return in about 4 months (around 10/10/2022) for Follow-up and fasting labs. Twin Steiner, MSN APRN FNP-BC

## 2022-06-22 ENCOUNTER — APPOINTMENT (OUTPATIENT)
Dept: INTERNAL MEDICINE CLINIC | Age: 67
End: 2022-06-22

## 2022-06-22 DIAGNOSIS — E11.65 TYPE 2 DIABETES MELLITUS WITH HYPERGLYCEMIA, WITH LONG-TERM CURRENT USE OF INSULIN (HCC): ICD-10-CM

## 2022-06-22 DIAGNOSIS — Z79.899 ON STATIN THERAPY: ICD-10-CM

## 2022-06-22 DIAGNOSIS — E03.9 ACQUIRED HYPOTHYROIDISM: ICD-10-CM

## 2022-06-22 DIAGNOSIS — Z79.4 TYPE 2 DIABETES MELLITUS WITH HYPERGLYCEMIA, WITH LONG-TERM CURRENT USE OF INSULIN (HCC): ICD-10-CM

## 2022-06-22 DIAGNOSIS — E11.69 MIXED DIABETIC HYPERLIPIDEMIA ASSOCIATED WITH TYPE 2 DIABETES MELLITUS (HCC): ICD-10-CM

## 2022-06-22 DIAGNOSIS — E78.2 MIXED DIABETIC HYPERLIPIDEMIA ASSOCIATED WITH TYPE 2 DIABETES MELLITUS (HCC): ICD-10-CM

## 2022-06-22 LAB
ALBUMIN SERPL-MCNC: 3.6 G/DL (ref 3.5–5)
ALBUMIN/GLOB SERPL: 0.9 {RATIO} (ref 1.1–2.2)
ALP SERPL-CCNC: 144 U/L (ref 45–117)
ALT SERPL-CCNC: 23 U/L (ref 12–78)
ANION GAP SERPL CALC-SCNC: 4 MMOL/L (ref 5–15)
AST SERPL-CCNC: 19 U/L (ref 15–37)
BILIRUB SERPL-MCNC: 0.6 MG/DL (ref 0.2–1)
BUN SERPL-MCNC: 15 MG/DL (ref 6–20)
BUN/CREAT SERPL: 24 (ref 12–20)
CALCIUM SERPL-MCNC: 9.9 MG/DL (ref 8.5–10.1)
CHLORIDE SERPL-SCNC: 107 MMOL/L (ref 97–108)
CHOLEST SERPL-MCNC: 172 MG/DL
CK SERPL-CCNC: 97 U/L (ref 26–192)
CO2 SERPL-SCNC: 28 MMOL/L (ref 21–32)
CREAT SERPL-MCNC: 0.62 MG/DL (ref 0.55–1.02)
EST. AVERAGE GLUCOSE BLD GHB EST-MCNC: 183 MG/DL
GLOBULIN SER CALC-MCNC: 3.8 G/DL (ref 2–4)
GLUCOSE SERPL-MCNC: 161 MG/DL (ref 65–100)
HBA1C MFR BLD: 8 % (ref 4–5.6)
HDLC SERPL-MCNC: 50 MG/DL
HDLC SERPL: 3.4 {RATIO} (ref 0–5)
LDLC SERPL CALC-MCNC: 101.2 MG/DL (ref 0–100)
POTASSIUM SERPL-SCNC: 3.9 MMOL/L (ref 3.5–5.1)
PROT SERPL-MCNC: 7.4 G/DL (ref 6.4–8.2)
SODIUM SERPL-SCNC: 139 MMOL/L (ref 136–145)
T4 FREE SERPL-MCNC: 1.1 NG/DL (ref 0.8–1.5)
TRIGL SERPL-MCNC: 104 MG/DL (ref ?–150)
TSH SERPL DL<=0.05 MIU/L-ACNC: 2.83 UIU/ML (ref 0.36–3.74)
VLDLC SERPL CALC-MCNC: 20.8 MG/DL

## 2022-06-23 RX ORDER — ATORVASTATIN CALCIUM 40 MG/1
40 TABLET, FILM COATED ORAL DAILY
Qty: 90 TABLET | Refills: 1 | Status: SHIPPED | OUTPATIENT
Start: 2022-06-23 | End: 2022-10-10 | Stop reason: DRUGHIGH

## 2022-06-23 NOTE — PROGRESS NOTES
Metabolic panel is unremarkable. Hemoglobin A1c is currently at 8.0%. Continue current medications for management of diabetes. Avoid concentrated sweets and excess carbohydrates. Cholesterol levels are a bit higher than normal.  For this, I am going to increase your atorvastatin to 40 mg nightly. Thyroid shows to be in normal functioning range. Please continue levothyroxine 50 mcg daily. Recheck labs in 4 to 6 months.

## 2022-10-05 ENCOUNTER — TELEPHONE (OUTPATIENT)
Dept: INTERNAL MEDICINE CLINIC | Age: 67
End: 2022-10-05

## 2022-10-05 NOTE — TELEPHONE ENCOUNTER
Pt requesting to come in early to do lab work for her appointment next week. Scheduled for Friday morning, please put lab orders in.

## 2022-10-07 ENCOUNTER — LAB ONLY (OUTPATIENT)
Dept: INTERNAL MEDICINE CLINIC | Age: 67
End: 2022-10-07

## 2022-10-07 DIAGNOSIS — E11.65 TYPE 2 DIABETES MELLITUS WITH HYPERGLYCEMIA, WITH LONG-TERM CURRENT USE OF INSULIN (HCC): Primary | ICD-10-CM

## 2022-10-07 DIAGNOSIS — E03.9 ACQUIRED HYPOTHYROIDISM: ICD-10-CM

## 2022-10-07 DIAGNOSIS — Z79.4 TYPE 2 DIABETES MELLITUS WITH HYPERGLYCEMIA, WITH LONG-TERM CURRENT USE OF INSULIN (HCC): Primary | ICD-10-CM

## 2022-10-07 DIAGNOSIS — E78.2 MIXED DIABETIC HYPERLIPIDEMIA ASSOCIATED WITH TYPE 2 DIABETES MELLITUS (HCC): ICD-10-CM

## 2022-10-07 DIAGNOSIS — E11.69 MIXED DIABETIC HYPERLIPIDEMIA ASSOCIATED WITH TYPE 2 DIABETES MELLITUS (HCC): ICD-10-CM

## 2022-10-07 DIAGNOSIS — I10 ESSENTIAL HYPERTENSION: ICD-10-CM

## 2022-10-07 DIAGNOSIS — Z79.899 ON STATIN THERAPY: ICD-10-CM

## 2022-10-07 LAB
ALBUMIN SERPL-MCNC: 3.7 G/DL (ref 3.5–5)
ALBUMIN/GLOB SERPL: 1 {RATIO} (ref 1.1–2.2)
ALP SERPL-CCNC: 149 U/L (ref 45–117)
ALT SERPL-CCNC: 22 U/L (ref 12–78)
ANION GAP SERPL CALC-SCNC: 5 MMOL/L (ref 5–15)
AST SERPL-CCNC: 16 U/L (ref 15–37)
BILIRUB SERPL-MCNC: 0.6 MG/DL (ref 0.2–1)
BUN SERPL-MCNC: 12 MG/DL (ref 6–20)
BUN/CREAT SERPL: 17 (ref 12–20)
CALCIUM SERPL-MCNC: 9.3 MG/DL (ref 8.5–10.1)
CHLORIDE SERPL-SCNC: 106 MMOL/L (ref 97–108)
CHOLEST SERPL-MCNC: 228 MG/DL
CK SERPL-CCNC: 120 U/L (ref 26–192)
CO2 SERPL-SCNC: 28 MMOL/L (ref 21–32)
CREAT SERPL-MCNC: 0.7 MG/DL (ref 0.55–1.02)
EST. AVERAGE GLUCOSE BLD GHB EST-MCNC: 220 MG/DL
GLOBULIN SER CALC-MCNC: 3.7 G/DL (ref 2–4)
GLUCOSE SERPL-MCNC: 212 MG/DL (ref 65–100)
HBA1C MFR BLD: 9.3 % (ref 4–5.6)
HDLC SERPL-MCNC: 46 MG/DL
HDLC SERPL: 5 {RATIO} (ref 0–5)
LDLC SERPL CALC-MCNC: 152.8 MG/DL (ref 0–100)
POTASSIUM SERPL-SCNC: 3.9 MMOL/L (ref 3.5–5.1)
PROT SERPL-MCNC: 7.4 G/DL (ref 6.4–8.2)
SODIUM SERPL-SCNC: 139 MMOL/L (ref 136–145)
TRIGL SERPL-MCNC: 146 MG/DL (ref ?–150)
TSH SERPL DL<=0.05 MIU/L-ACNC: 2.86 UIU/ML (ref 0.36–3.74)
VLDLC SERPL CALC-MCNC: 29.2 MG/DL

## 2022-10-10 DIAGNOSIS — E78.2 MIXED DIABETIC HYPERLIPIDEMIA ASSOCIATED WITH TYPE 2 DIABETES MELLITUS (HCC): Primary | ICD-10-CM

## 2022-10-10 DIAGNOSIS — E11.69 MIXED DIABETIC HYPERLIPIDEMIA ASSOCIATED WITH TYPE 2 DIABETES MELLITUS (HCC): Primary | ICD-10-CM

## 2022-10-10 RX ORDER — ATORVASTATIN CALCIUM 80 MG/1
80 TABLET, FILM COATED ORAL DAILY
Qty: 90 TABLET | Refills: 1 | Status: SHIPPED | OUTPATIENT
Start: 2022-10-10

## 2022-10-10 NOTE — PROGRESS NOTES
Thyroid level shows to be in normal functioning range. Cholesterol levels are significantly higher than normal.  I am going to increase your atorvastatin to 80 mg daily. Your hemoglobin A1c shows worsening control of your diabetes and is currently at 9.3%. Please verify if you are currently seeing an endocrinologist.  If not, I would like to refer you to someone. Metabolic panel is unremarkable.   CK level is normal.

## 2022-10-11 ENCOUNTER — OFFICE VISIT (OUTPATIENT)
Dept: INTERNAL MEDICINE CLINIC | Age: 67
End: 2022-10-11
Payer: MEDICARE

## 2022-10-11 VITALS
TEMPERATURE: 98.2 F | DIASTOLIC BLOOD PRESSURE: 78 MMHG | HEIGHT: 60 IN | SYSTOLIC BLOOD PRESSURE: 130 MMHG | WEIGHT: 146 LBS | RESPIRATION RATE: 16 BRPM | BODY MASS INDEX: 28.66 KG/M2 | HEART RATE: 88 BPM | OXYGEN SATURATION: 100 %

## 2022-10-11 DIAGNOSIS — E11.69 MIXED DIABETIC HYPERLIPIDEMIA ASSOCIATED WITH TYPE 2 DIABETES MELLITUS (HCC): ICD-10-CM

## 2022-10-11 DIAGNOSIS — Z79.899 ON STATIN THERAPY: ICD-10-CM

## 2022-10-11 DIAGNOSIS — Z00.00 ENCOUNTER FOR SUBSEQUENT ANNUAL WELLNESS VISIT (AWV) IN MEDICARE PATIENT: Primary | ICD-10-CM

## 2022-10-11 DIAGNOSIS — E03.9 ACQUIRED HYPOTHYROIDISM: ICD-10-CM

## 2022-10-11 DIAGNOSIS — E78.2 MIXED DIABETIC HYPERLIPIDEMIA ASSOCIATED WITH TYPE 2 DIABETES MELLITUS (HCC): ICD-10-CM

## 2022-10-11 DIAGNOSIS — Z71.89 ACP (ADVANCE CARE PLANNING): ICD-10-CM

## 2022-10-11 DIAGNOSIS — Z79.4 TYPE 2 DIABETES MELLITUS WITH HYPERGLYCEMIA, WITH LONG-TERM CURRENT USE OF INSULIN (HCC): ICD-10-CM

## 2022-10-11 DIAGNOSIS — E11.65 TYPE 2 DIABETES MELLITUS WITH HYPERGLYCEMIA, WITH LONG-TERM CURRENT USE OF INSULIN (HCC): ICD-10-CM

## 2022-10-11 PROCEDURE — 1123F ACP DISCUSS/DSCN MKR DOCD: CPT | Performed by: NURSE PRACTITIONER

## 2022-10-11 PROCEDURE — G8427 DOCREV CUR MEDS BY ELIG CLIN: HCPCS | Performed by: NURSE PRACTITIONER

## 2022-10-11 PROCEDURE — G8400 PT W/DXA NO RESULTS DOC: HCPCS | Performed by: NURSE PRACTITIONER

## 2022-10-11 PROCEDURE — G9899 SCRN MAM PERF RSLTS DOC: HCPCS | Performed by: NURSE PRACTITIONER

## 2022-10-11 PROCEDURE — 99214 OFFICE O/P EST MOD 30 MIN: CPT | Performed by: NURSE PRACTITIONER

## 2022-10-11 PROCEDURE — 3017F COLORECTAL CA SCREEN DOC REV: CPT | Performed by: NURSE PRACTITIONER

## 2022-10-11 PROCEDURE — 1090F PRES/ABSN URINE INCON ASSESS: CPT | Performed by: NURSE PRACTITIONER

## 2022-10-11 PROCEDURE — G0439 PPPS, SUBSEQ VISIT: HCPCS | Performed by: NURSE PRACTITIONER

## 2022-10-11 PROCEDURE — 2022F DILAT RTA XM EVC RTNOPTHY: CPT | Performed by: NURSE PRACTITIONER

## 2022-10-11 PROCEDURE — G8417 CALC BMI ABV UP PARAM F/U: HCPCS | Performed by: NURSE PRACTITIONER

## 2022-10-11 PROCEDURE — 3046F HEMOGLOBIN A1C LEVEL >9.0%: CPT | Performed by: NURSE PRACTITIONER

## 2022-10-11 PROCEDURE — G8432 DEP SCR NOT DOC, RNG: HCPCS | Performed by: NURSE PRACTITIONER

## 2022-10-11 PROCEDURE — G8536 NO DOC ELDER MAL SCRN: HCPCS | Performed by: NURSE PRACTITIONER

## 2022-10-11 PROCEDURE — 1101F PT FALLS ASSESS-DOCD LE1/YR: CPT | Performed by: NURSE PRACTITIONER

## 2022-10-11 RX ORDER — LEVOTHYROXINE SODIUM 50 UG/1
50 TABLET ORAL
Qty: 90 TABLET | Refills: 1 | Status: SHIPPED | OUTPATIENT
Start: 2022-10-11

## 2022-10-11 NOTE — PROGRESS NOTES
Rishi Rubio is a 75 y.o. female     Chief Complaint   Patient presents with    Diabetes    Annual Wellness Visit     Medicare wellness       Visit Vitals  /78 (BP 1 Location: Left arm, BP Patient Position: Sitting, BP Cuff Size: Adult)   Pulse 88   Temp 98.2 °F (36.8 °C) (Oral)   Resp 16   Ht 5' 0.43\" (1.535 m)   Wt 146 lb (66.2 kg)   SpO2 100%   BMI 28.11 kg/m²       Health Maintenance Due   Topic Date Due    COVID-19 Vaccine (1) Never done    Pneumococcal 65+ years (1 - PCV) Never done    Eye Exam Retinal or Dilated  Never done    DTaP/Tdap/Td series (1 - Tdap) Never done    Shingrix Vaccine Age 50> (1 of 2) Never done    Flu Vaccine (1) Never done    Foot Exam Q1  09/09/2022    MICROALBUMIN Q1  09/09/2022    Medicare Yearly Exam  09/10/2022         1. \"Have you been to the ER, urgent care clinic since your last visit? Hospitalized since your last visit? \" No    2. \"Have you seen or consulted any other health care providers outside of the [address] [place] [address] since your last visit? \" No     3. For patients aged 39-70: Has the patient had a colonoscopy / FIT/ Cologuard? Yes - no Care Gap present      If the patient is female:    4. For patients aged 41-77: Has the patient had a mammogram within the past 2 years? Yes - no Care Gap present      5. For patients aged 21-65: Has the patient had a pap smear?  NA - based on age or sex

## 2022-10-11 NOTE — PROGRESS NOTES
HPI:    Ms. Afshan Bright is a 68-year-old -American female who is here for her annual wellness visit subsequent encounter as well as follow-up regarding conditions which include: Insulin-dependent uncontrolled type II diabetic with hyperlipidemia and hypothyroidism. She states she is feeling fairly well overall, and continues to take her medication as prescribed. The patient was found to have a hemoglobin A1c that had worsened since previous check, and is currently at 9.0%. She states she is compliant with her current medication regimen, but is not checking her blood sugars at this time due to lack of supplies. The patient states she was previously on Dexcom 6 blood sugar monitoring system, but there were issues with approval of her supplies through [employer]. We will investigate this further today. She has had no hypoglycemic events that she can tell. She continues to take levothyroxine 50 mcg daily for management of her thyroid, and is currently taking atorvastatin 80 mg nightly for her cholesterol. She denies any recent episodes of chest pain, chest pressure, shortness of breath, headaches, dizziness, blurred vision, palpitations, or syncope episodes. She states she is trying to be watchful of her diet overall. Annual Wellness Visit    End of Life Planning: This was discussed with her today and she has NO advanced directive - not interested in additional information. Reviewed DNR/DNI and patient is not interested.       Depression Screen:  3 most recent PHQ Screens 10/11/2022   Little interest or pleasure in doing things Not at all   Feeling down, depressed, irritable, or hopeless Not at all   Total Score PHQ 2 0   Trouble falling or staying asleep, or sleeping too much Not at all   Feeling tired or having little energy Not at all   Poor appetite, weight loss, or overeating Not at all   Feeling bad about yourself - or that you are a failure or have let yourself or your family down Not at all   Trouble concentrating on things such as school, work, reading, or watching TV Not at all   Moving or speaking so slowly that other people could have noticed; or the opposite being so fidgety that others notice Not at all   Thoughts of being better off dead, or hurting yourself in some way Not at all   PHQ 9 Score 0         Fall Risk:   Fall Risk Assessment, last 12 mths 10/11/2022   Able to walk? Yes   Fall in past 12 months? 0   Do you feel unsteady? 0   Are you worried about falling 0       Abuse Screen:  Abuse Screening Questionnaire 10/11/2022   Do you ever feel afraid of your partner? N   Are you in a relationship with someone who physically or mentally threatens you? N   Is it safe for you to go home? Y         Alcohol Risk Factor Screening: On any occasion during the past 3 months, have you had more than 3 drinks containing alcohol? No  Do you average more than 7 drinks per week? No    Hearing Loss:  Hearing is good. Activities of Daily Living:  Self-care. Requires assistance with: no ADLs    Adult Nutrition Screen:  No risk factors noted. Health Maintenance  Daily Aspirin: no  Bone Density: up to date  Glaucoma Screening: Yes    Immunizations:                Influenza: not up to date - refused. Tetanus: not up to date -recommended. Shingles: not up to date -recommended. Pneumovax: not up to date -recommended. COVID-19: Declined use. Cancer screening:               Cervical: up to date. Breast: up to date, reviewed SBE. Colon: up to date. Patient Care Team:  Yimi Piper NP as PCP - General (Internal Medicine Physician)  Yimi Piper NP as PCP - [facility] Empaneled Provider  Ulices Bianchi MD as Surgeon (General Surgery)     Prior to Admission medications    Medication Sig Start Date End Date Taking? Authorizing Provider   levothyroxine (SYNTHROID) 50 mcg tablet Take 1 Tablet by mouth Daily (before breakfast).  Indications: a condition with low thyroid hormone levels 10/11/22  Yes Jannet VARGAS NP   atorvastatin (LIPITOR) 80 mg tablet Take 1 Tablet by mouth daily. 10/10/22  Yes Jannet VARGAS NP   metFORMIN (GLUCOPHAGE) 500 mg tablet Take 2 Tablets by mouth two (2) times daily (with meals). 4/14/22  Yes Jannet VARGAS NP   insulin glargine (LANTUS,BASAGLAR) 100 unit/mL (3 mL) inpn 10 Units by SubCUTAneous route nightly. Indications: type 2 diabetes mellitus 4/14/22  Yes Jannet VARGAS NP   glipiZIDE (GLUCOTROL) 5 mg tablet Take 2.5 mg by mouth two (2) times a day. Yes Provider, Historical   Insulin Needles, Disposable, 31 gauge x 5/16\" ndle Daily use 8/26/20  Yes Seun Soto MD   multivitamin (ONE A DAY) tablet Take 1 Tab by mouth daily. Yes Provider, Historical   levothyroxine (SYNTHROID) 50 mcg tablet Take 1 Tablet by mouth Daily (before breakfast).  Indications: a condition with low thyroid hormone levels 4/14/22 10/11/22  Lesly Pérez NP        No Known Allergies      Past Medical History:   Diagnosis Date    Diabetes ([facility] [address]     Hypercholesterolemia        Past Surgical History:   Procedure Laterality Date    COLONOSCOPY N/A 9/16/2020    COLONOSCOPY performed by Jose Luis Fernandez MD at [facility] OR    HX CATARACT REMOVAL Bilateral 10/2021    HX COLONOSCOPY      HX OOPHORECTOMY Left        Social History     Socioeconomic History    Marital status:      Spouse name: Not on file    Number of children: Not on file    Years of education: Not on file    Highest education level: Not on file   Occupational History    Not on file   Tobacco Use    Smoking status: Never    Smokeless tobacco: Never   Vaping Use    Vaping Use: Never used   Substance and Sexual Activity    Alcohol use: Never    Drug use: Never    Sexual activity: Not Currently   Other Topics Concern    Not on file   Social History Narrative    Not on file     Social Determinants of Health     Financial Resource Strain: Low Risk     Difficulty of Paying Living Expenses: Not hard at all   Food Insecurity: No Food Insecurity    Worried About Running Out of Food in the Last Year: Never true    Ran Out of Food in the Last Year: Never true   Transportation Needs: Not on file   Physical Activity: Not on file   Stress: Not on file   Social Connections: Not on file   Intimate Partner Violence: Not on file   Housing Stability: Not on file       Family History   Problem Relation Age of Onset    Alzheimer's Disease Mother     No Known Problems Father        Patient Active Problem List   Diagnosis Code    Type 2 diabetes mellitus E11.9    Acquired hypothyroidism E03.9    Mixed diabetic hyperlipidemia associated with type 2 [facility] E11.69, E78.2    On statin therapy Z79.899    Overweight (BMI 25.0-29. 9) E66.3           Review of Systems   Constitutional: Negative. HENT: Negative. Eyes: Negative. Respiratory: Negative. Cardiovascular: Negative. Gastrointestinal: Negative. Genitourinary: Negative. Musculoskeletal: Negative. Skin: Negative. Neurological: Negative. Endo/Heme/Allergies: Negative. Psychiatric/Behavioral: Negative. Physical Exam  Vitals and nursing note reviewed. Constitutional:       General: She is not in acute distress. Appearance: Normal appearance. HENT:      Head: Normocephalic. Eyes:      General: No scleral icterus. Pupils: Pupils are equal, round, and reactive to light. Cardiovascular:      Rate and Rhythm: Normal rate and regular rhythm. Pulses: Normal pulses. Heart sounds: Murmur heard. No friction rub. No gallop. Pulmonary:      Effort: Pulmonary effort is normal.      Breath sounds: Normal breath sounds. No stridor. No wheezing. Musculoskeletal:      Cervical back: Neck supple. Skin:     General: Skin is warm. Neurological:      General: No focal deficit present. Mental Status: She is alert and oriented to person, place, and time.    Psychiatric:         Mood and Affect: Mood normal. Behavior: Behavior normal.          Visit Vitals  /78 (BP 1 Location: Left arm, BP Patient Position: Sitting, BP Cuff Size: Adult)   Pulse 88   Temp 98.2 °F (36.8 °C) (Oral)   Resp 16   Ht 5' 0.43\" (1.535 m)   Wt 146 lb (66.2 kg)   SpO2 100%   BMI 28.11 kg/m²         Assessment & Plan:    ICD-10-CM ICD-9-CM    1. Encounter for subsequent annual wellness visit (AWV) in Medicare patient  Z00.00 V70.0  WELLNESS EXAM      2. Type 2 diabetes mellitus with hyperglycemia, with long-term current use of insulin (HCC)  E11.65 250.00 MICROALBUMIN, UR, RAND W/ MICROALB/CREAT RATIO    Z79.4 790.29 AMB SUPPLY ORDER     V58.67 REFERRAL TO ENDOCRINOLOGY      MICROALBUMIN, UR, RAND W/ MICROALB/CREAT RATIO      3. Mixed diabetic hyperlipidemia associated with type 2 diabetes mellitus (HCC)  E11.69 250.80     E78.2 272.2       4. Acquired hypothyroidism  E03.9 244.9 levothyroxine (SYNTHROID) 50 mcg tablet      5. On statin therapy  Z79.899 V58.69       6. ACP (advance care planning)  Z71.89 V65.49         1: Labs ordered today include: Urine microalbumin. 2: Patient will be referred to endocrinology for further evaluation and management of diabetes. 3: Patient to continue levothyroxine for thyroid management. I will continue to monitor this. 4: Dexcom 6 sensors sent to medical supply as requested. 5: Encouraged healthy lifestyle management and appropriate dietary intake. 6: Patient to follow-up with me in approximately 4 months, or sooner as needed. Patient states understanding and agrees with plan. Advised her to call back or return to office if symptoms worsen/change/persist.  Discussed expected course/resolution/complications of diagnosis in detail with patient. Medication risks/benefits/costs/interactions/alternatives discussed with patient. She was given an after visit summary which includes diagnoses, current medications, & vitals. She expressed understanding with the diagnosis and plan. Signed,  Maribel Rinaldi.  Alan Mendieta, MSN APRN FNP-BC

## 2022-10-12 LAB
CREAT UR-MCNC: 106 MG/DL
MICROALBUMIN UR-MCNC: 19.1 MG/DL
MICROALBUMIN/CREAT UR-RTO: 180 MG/G (ref 0–30)

## 2022-10-12 NOTE — PROGRESS NOTES
Urine microalbumin shows leakage of protein through kidneys, but has improved significantly since a year ago. We will keep an eye on this.

## 2022-11-14 ENCOUNTER — OFFICE VISIT (OUTPATIENT)
Dept: ENDOCRINOLOGY | Age: 67
End: 2022-11-14
Payer: MEDICARE

## 2022-11-14 VITALS
WEIGHT: 143.3 LBS | BODY MASS INDEX: 28.13 KG/M2 | HEIGHT: 60 IN | HEART RATE: 83 BPM | SYSTOLIC BLOOD PRESSURE: 138 MMHG | DIASTOLIC BLOOD PRESSURE: 73 MMHG

## 2022-11-14 DIAGNOSIS — R80.9 TYPE 2 DIABETES MELLITUS WITH MICROALBUMINURIA, WITH LONG-TERM CURRENT USE OF INSULIN (HCC): Primary | ICD-10-CM

## 2022-11-14 DIAGNOSIS — E03.9 ACQUIRED HYPOTHYROIDISM: ICD-10-CM

## 2022-11-14 DIAGNOSIS — E11.29 TYPE 2 DIABETES MELLITUS WITH MICROALBUMINURIA, WITH LONG-TERM CURRENT USE OF INSULIN (HCC): Primary | ICD-10-CM

## 2022-11-14 DIAGNOSIS — Z79.4 TYPE 2 DIABETES MELLITUS WITH MICROALBUMINURIA, WITH LONG-TERM CURRENT USE OF INSULIN (HCC): Primary | ICD-10-CM

## 2022-11-14 DIAGNOSIS — E78.2 MIXED HYPERLIPIDEMIA: ICD-10-CM

## 2022-11-14 PROCEDURE — G8400 PT W/DXA NO RESULTS DOC: HCPCS | Performed by: INTERNAL MEDICINE

## 2022-11-14 PROCEDURE — G8432 DEP SCR NOT DOC, RNG: HCPCS | Performed by: INTERNAL MEDICINE

## 2022-11-14 PROCEDURE — 1123F ACP DISCUSS/DSCN MKR DOCD: CPT | Performed by: INTERNAL MEDICINE

## 2022-11-14 PROCEDURE — 3017F COLORECTAL CA SCREEN DOC REV: CPT | Performed by: INTERNAL MEDICINE

## 2022-11-14 PROCEDURE — 99205 OFFICE O/P NEW HI 60 MIN: CPT | Performed by: INTERNAL MEDICINE

## 2022-11-14 PROCEDURE — 1090F PRES/ABSN URINE INCON ASSESS: CPT | Performed by: INTERNAL MEDICINE

## 2022-11-14 PROCEDURE — 3046F HEMOGLOBIN A1C LEVEL >9.0%: CPT | Performed by: INTERNAL MEDICINE

## 2022-11-14 PROCEDURE — G9899 SCRN MAM PERF RSLTS DOC: HCPCS | Performed by: INTERNAL MEDICINE

## 2022-11-14 PROCEDURE — 1101F PT FALLS ASSESS-DOCD LE1/YR: CPT | Performed by: INTERNAL MEDICINE

## 2022-11-14 PROCEDURE — G8427 DOCREV CUR MEDS BY ELIG CLIN: HCPCS | Performed by: INTERNAL MEDICINE

## 2022-11-14 PROCEDURE — G8536 NO DOC ELDER MAL SCRN: HCPCS | Performed by: INTERNAL MEDICINE

## 2022-11-14 PROCEDURE — G8417 CALC BMI ABV UP PARAM F/U: HCPCS | Performed by: INTERNAL MEDICINE

## 2022-11-14 PROCEDURE — 2022F DILAT RTA XM EVC RTNOPTHY: CPT | Performed by: INTERNAL MEDICINE

## 2022-11-14 RX ORDER — GLIPIZIDE 10 MG/1
10 TABLET ORAL 2 TIMES DAILY
Qty: 90 TABLET | Refills: 1 | Status: SHIPPED | OUTPATIENT
Start: 2022-11-14

## 2022-11-14 NOTE — PROGRESS NOTES
Chief Complaint   Patient presents with    Diabetes     Pcp and pharmacy verified     History of Present Illness: Brandee Jha is a 75 y.o. female who I was asked to see in consult by Mariann Phillips for evaluation of diabetes. Was diagnosed with diabetes \"About 37 years ago with my pregnancy, but it went away and came back in 2007\". Her current regimen is Lantus 15 units HS, Metformin 500mg BID and Glipizide 5mg BID. Checks blood sugars using a DexCom CGM Reviewing her DexCom readings she is waking up in at goal, but her BGs are spiking around 7-8AM (as she is waking up) after her 11AM meal and after dinner. Her BGs are the highest after her 11AM breakfast.  She has not been on any other medications in the past for her DM. Most recent Hgb A1c was 9.3% in October 2022. A typical day is as follows:  Pt wakes around 830AM  - She has breakfast around 1030-11AM, yesterday she had eggs, grits, turkey long and water. - She will have lunch around 230PM, she does not recall what she had for lunch yesterday. - She will occasionally have fruit for a mid-afternoon snack.  - She has dinner around 6PM, last night she had fish, fried potatoes and water. - She denies having an evening snack. Exercise consists of \"dancing and I sometimes use my exercise bike\". No history of vascular disease. No known family hx of CVA or CAD. She has occasional numbness in her feet and  she does have microalbuminuria. Last eye exam was 2021 \"I had cataract surgery by Dr. Olivas Forward" Pt denies any hx of retinopathy. I will request the records from Dr. Jarred Barry. She insists she takes her Lipitor 80mg every morning. She was diagnosed with Hypothyroidism \"For just a few months, because I have not been taking the medication for long\". She take her LT4 50mcg every morning with her other AM meds.     Pt reports that she has taken DM education classes in the past.    Past Medical History:   Diagnosis Date    Diabetes ([facility] [address] Hypercholesterolemia      Past Surgical History:   Procedure Laterality Date    COLONOSCOPY N/A 9/16/2020    COLONOSCOPY performed by Nirmala Craig MD at [facility] AMBULATORY OR    HX CATARACT REMOVAL Bilateral 10/2021    HX COLONOSCOPY      HX OOPHORECTOMY Left      Current Outpatient Medications   Medication Sig    blood-glucose meter,continuous (DEXCOM G6 )     glipiZIDE (GLUCOTROL) 10 mg tablet Take 1 Tablet by mouth two (2) times a day. levothyroxine (SYNTHROID) 50 mcg tablet Take 1 Tablet by mouth Daily (before breakfast). Indications: a condition with low thyroid hormone levels    atorvastatin (LIPITOR) 80 mg tablet Take 1 Tablet by mouth daily. metFORMIN (GLUCOPHAGE) 500 mg tablet Take 2 Tablets by mouth two (2) times daily (with meals). insulin glargine (LANTUS,BASAGLAR) 100 unit/mL (3 mL) inpn 10 Units by SubCUTAneous route nightly. Indications: type 2 diabetes mellitus (Patient taking differently: 15 Units by SubCUTAneous route nightly. Indications: type 2 diabetes mellitus)    Insulin Needles, Disposable, 31 gauge x 5/16\" ndle Daily use    multivitamin (ONE A DAY) tablet Take 1 Tab by mouth daily. No current facility-administered medications for this visit.      No Known Allergies  Family History   Problem Relation Age of Onset    Alzheimer's Disease Mother     Lung Disease Father     No Known Problems Sister     Other Sister         \"Blood Disorder\"    Diabetes Brother     No Known Problems Brother     No Known Problems Brother     No Known Problems Brother     No Known Problems Maternal Grandmother     No Known Problems Maternal Grandfather     No Known Problems Paternal Grandmother     No Known Problems Paternal Grandfather     Cancer Neg Hx      Social History     Socioeconomic History    Marital status:      Spouse name: Not on file    Number of children: Not on file    Years of education: Not on file    Highest education level: Not on file   Occupational History    Not on file   Tobacco Use    Smoking status: Never    Smokeless tobacco: Never   Vaping Use    Vaping Use: Never used   Substance and Sexual Activity    Alcohol use: Yes     Comment: Rarely    Drug use: Never    Sexual activity: Not Currently   Other Topics Concern    Not on file   Social History Narrative    Not on file     Social Determinants of Health     Financial Resource Strain: Low Risk     Difficulty of Paying Living Expenses: Not hard at all   Food Insecurity: No Food Insecurity    Worried About Running Out of Food in the Last Year: Never true    Ran Out of Food in the Last Year: Never true   Transportation Needs: Not on file   Physical Activity: Not on file   Stress: Not on file   Social Connections: Not on file   Intimate Partner Violence: Not on file   Housing Stability: Not on file     Review of Systems:  Constitutional Symptoms: no fevers, chills, weight loss  Eyes: no blurry vision or double vision  Cardiovascular: no chest pain or palpitations  Respiratory: no cough or shortness of breath  Gastrointestinal: no dysphagia or abdominal pain  Musculoskeletal: no joint pains or weakness  Integumentary: no rashes  Neurological: + numbness in feet  Psychiatric: no depression or anxiety  Endocrine: no heat or cold intolerance, no polyuria or polydipsia    Physical Examination:  Blood pressure 138/73, pulse 83, height 5' 0.43\" (1.535 m), weight 143 lb 4.8 oz (65 kg).   General: pleasant, no distress, good eye contact  HEENT: no exopthalmos, no periorbital edema, no scleral/conjunctival injection, EOMI, no lid lag or stare  Neck: supple, no thyromegaly, masses, lymph nodes, or carotid bruits, no supraclavicular or dorsocervical fat pads  Cardiovascular: regular, normal rate, normal S1 and S2, no murmurs/rubs/gallops, 2+ dorsalis pedis pulses bilaterally  Respiratory: clear to auscultation bilaterally  Gastrointestinal: soft, nontender, nondistended, no masses, no hepatosplenomegaly  Musculoskeletal: no proximal muscle weakness in upper or lower extremities  Integumentary: no acanthosis nigricans, no abdominal striae, no rashes, no edema, no foot ulcers  Neurological: DTR 2+, no tremors  Psychiatric: normal mood and affect    Diabetic foot exam:     Left Foot:   Visual Exam: callous - present   Pulse DP: 2+ (normal)   Filament test: normal sensation    Vibratory sensation: absent      Right Foot:   Visual Exam: callous - present   Pulse DP: 2+ (normal)   Filament test: normal sensation    Vibratory sensation: normal      Data Reviewed:   Component      Latest Ref Rng & Units 10/11/2022 10/7/2022   Sodium      136 - 145 mmol/L  139   Potassium      3.5 - 5.1 mmol/L  3.9   Chloride      97 - 108 mmol/L  106   CO2      21 - 32 mmol/L  28   Anion gap      5 - 15 mmol/L  5   Glucose      65 - 100 mg/dL  212 (H)   BUN      6 - 20 MG/DL  12   Creatinine      0.55 - 1.02 MG/DL  0.70   BUN/Creatinine ratio      12 - 20    17   eGFR      >60 ml/min/1.73m2  >60   Calcium      8.5 - 10.1 MG/DL  9.3   Bilirubin, total      0.2 - 1.0 MG/DL  0.6   ALT      12 - 78 U/L  22   AST      15 - 37 U/L  16   Alk. phosphatase      45 - 117 U/L  149 (H)   Protein, total      6.4 - 8.2 g/dL  7.4   Albumin      3.5 - 5.0 g/dL  3.7   Globulin      2.0 - 4.0 g/dL  3.7   A-G Ratio      1.1 - 2.2    1.0 (L)   Cholesterol, total      <200 MG/DL  228 (H)   Triglyceride      <150 MG/DL  146   HDL Cholesterol      MG/DL  46   LDL, calculated      0 - 100 MG/DL  152.8 (H)   VLDL, calculated      MG/DL  29.2   CHOL/HDL Ratio      0.0 - 5.0    5.0   Microalbumin,urine random      MG/DL 19.10    Creatinine, urine random      mg/dL 106.00    Microalbumin/Creat. Ratio      0 - 30 mg/g 180 (H)    Hemoglobin A1c, (calculated)      4.0 - 5.6 %  9.3 (H)   Est. average glucose      mg/dL  220   TSH      0.36 - 3.74 uIU/mL  2.86       Assessment/Plan:   1. Type 2 diabetes mellitus with microalbuminuria, with long-term current use of insulin ([facility] [address]    2.  Acquired hypothyroidism 3. Mixed hyperlipidemia    1) Pt given copy of \"Daily Diabetes Meal Planning Guide\" and educated about the \"[place] dinner plate\", reading food labels and which foods have the highest carbohydrates. Pt instructed to limit her carbohydrates to 45-60 grams with meals and 15 grams or less with snacks (but to limit snacks). We also discussed the importance of reducing how much fruit she eats to only 1-2 servings per day. I will increase her Glipizide to 10mg daily and continue the Metformin 500mg BID and Lantus 15 units HS. With her hx of neuropathy and calluses, she would benefit from DM shoes and inserts. 2) Pt is clinically and biochemically euthyroid on LT4 50mcg daily. Pt to continue the LT4 50mcg daily. 3) Her LDL in October 2022 was 152 and her TC was 228. Pt reports that her PCP increased her Atorvastatin to 80mg daily recently. She insists she has been adherent with her Atorvastatin. Pt voices understanding and agreement with the plan. RTC 4 months    I spent 60 minutes on this case and > 50% of the time was spent in counseling on the above issues. Patient Instructions   1) I am increasing your Glipizide to a 10mg pill. I want you to take one pill with breakfast and one pill with dinner. 2) Be carefull with the fruit as it will cause your blood sugars to run higher. I would limit my fruit on one or two servings per day. Follow-up and Dispositions    Return in about 4 months (around 3/14/2023).          Copy sent to:  Sheila Amos

## 2022-11-14 NOTE — LETTER
11/14/2022    Patient: Karri Dale   YOB: 1955   Date of Visit: 11/14/2022     Margareth Horan NP  [facility]  [facility]  Via In Basket    Dear Margareth Horan NP,      Thank you for referring Ms. Patsy Oakley to [facility] for evaluation. My notes for this consultation are attached. If you have questions, please do not hesitate to call me. I look forward to following your patient along with you.       Sincerely,    Carmelo Saravia MD

## 2022-11-14 NOTE — PATIENT INSTRUCTIONS
1) I am increasing your Glipizide to a 10mg pill. I want you to take one pill with breakfast and one pill with dinner. 2) Be carefull with the fruit as it will cause your blood sugars to run higher. I would limit my fruit on one or two servings per day.

## 2023-01-12 ENCOUNTER — OFFICE VISIT (OUTPATIENT)
Dept: INTERNAL MEDICINE CLINIC | Age: 68
End: 2023-01-12
Payer: MEDICARE

## 2023-01-12 VITALS
BODY MASS INDEX: 28.74 KG/M2 | RESPIRATION RATE: 16 BRPM | OXYGEN SATURATION: 100 % | SYSTOLIC BLOOD PRESSURE: 132 MMHG | HEIGHT: 60 IN | HEART RATE: 79 BPM | DIASTOLIC BLOOD PRESSURE: 74 MMHG | TEMPERATURE: 97.9 F | WEIGHT: 146.4 LBS

## 2023-01-12 DIAGNOSIS — E11.65 TYPE 2 DIABETES MELLITUS WITH HYPERGLYCEMIA, WITH LONG-TERM CURRENT USE OF INSULIN (HCC): Primary | ICD-10-CM

## 2023-01-12 DIAGNOSIS — E78.2 MIXED DIABETIC HYPERLIPIDEMIA ASSOCIATED WITH TYPE 2 DIABETES MELLITUS (HCC): ICD-10-CM

## 2023-01-12 DIAGNOSIS — E03.9 ACQUIRED HYPOTHYROIDISM: ICD-10-CM

## 2023-01-12 DIAGNOSIS — Z79.4 TYPE 2 DIABETES MELLITUS WITH HYPERGLYCEMIA, WITH LONG-TERM CURRENT USE OF INSULIN (HCC): Primary | ICD-10-CM

## 2023-01-12 DIAGNOSIS — Z79.899 ON STATIN THERAPY: ICD-10-CM

## 2023-01-12 DIAGNOSIS — E11.69 MIXED DIABETIC HYPERLIPIDEMIA ASSOCIATED WITH TYPE 2 DIABETES MELLITUS (HCC): ICD-10-CM

## 2023-01-12 PROCEDURE — 3046F HEMOGLOBIN A1C LEVEL >9.0%: CPT | Performed by: NURSE PRACTITIONER

## 2023-01-12 PROCEDURE — 2022F DILAT RTA XM EVC RTNOPTHY: CPT | Performed by: NURSE PRACTITIONER

## 2023-01-12 PROCEDURE — G8400 PT W/DXA NO RESULTS DOC: HCPCS | Performed by: NURSE PRACTITIONER

## 2023-01-12 PROCEDURE — G8417 CALC BMI ABV UP PARAM F/U: HCPCS | Performed by: NURSE PRACTITIONER

## 2023-01-12 PROCEDURE — G8432 DEP SCR NOT DOC, RNG: HCPCS | Performed by: NURSE PRACTITIONER

## 2023-01-12 PROCEDURE — 99214 OFFICE O/P EST MOD 30 MIN: CPT | Performed by: NURSE PRACTITIONER

## 2023-01-12 PROCEDURE — G8427 DOCREV CUR MEDS BY ELIG CLIN: HCPCS | Performed by: NURSE PRACTITIONER

## 2023-01-12 PROCEDURE — 1101F PT FALLS ASSESS-DOCD LE1/YR: CPT | Performed by: NURSE PRACTITIONER

## 2023-01-12 PROCEDURE — 1123F ACP DISCUSS/DSCN MKR DOCD: CPT | Performed by: NURSE PRACTITIONER

## 2023-01-12 PROCEDURE — G9899 SCRN MAM PERF RSLTS DOC: HCPCS | Performed by: NURSE PRACTITIONER

## 2023-01-12 PROCEDURE — 1090F PRES/ABSN URINE INCON ASSESS: CPT | Performed by: NURSE PRACTITIONER

## 2023-01-12 PROCEDURE — 3017F COLORECTAL CA SCREEN DOC REV: CPT | Performed by: NURSE PRACTITIONER

## 2023-01-12 PROCEDURE — G8536 NO DOC ELDER MAL SCRN: HCPCS | Performed by: NURSE PRACTITIONER

## 2023-01-12 NOTE — PROGRESS NOTES
Chief Complaint   Patient presents with    Diabetes     3M       SUBJECTIVE:    Dede Lesches is a 75 y.o. female who is here today for a follow up appointment regarding her current medical conditions including: Acquired hypothyroidism, type 2 diabetes with mixed hyperlipidemia, long-term use of statin therapy. She states she is feeling fairly well overall, and denies any new or acute complaints at this time. She is currently being seen by Dr. Lance Baldwin for management of her diabetes. She is currently using a Dexcom 6 monitoring system for blood sugars. She states she is using this routinely and is able to track her sugars well. Her morning blood glucose today was approximately 124 mg/dL. She denies any significant hypoglycemic episodes, but states that when she feels her sugar is a bit low, she \"suck(s) on some hard candy. \"     She continues to take levothyroxine daily for management of her hypothyroidism and tolerating this well. She denies any adverse side effects of medication. She is also on atorvastatin 80 mg daily for management of her cholesterol. She denies any adverse side effects of the medication. She denies any recent episodes of chest pain, chest pressure, shortness of breath, headaches, dizziness, blurred vision, palpitations, or syncope episodes. She states she is exercising regularly as well. She is happy about the progress she is making. Current Outpatient Medications   Medication Sig Dispense Refill    metFORMIN (GLUCOPHAGE) 500 mg tablet TAKE 2 TABLETS BY MOUTH  TWICE DAILY WITH MEALS 360 Tablet 1    blood-glucose meter,continuous (DEXCOM G6 )       glipiZIDE (GLUCOTROL) 10 mg tablet Take 1 Tablet by mouth two (2) times a day. 90 Tablet 1    levothyroxine (SYNTHROID) 50 mcg tablet Take 1 Tablet by mouth Daily (before breakfast). Indications: a condition with low thyroid hormone levels 90 Tablet 1    atorvastatin (LIPITOR) 80 mg tablet Take 1 Tablet by mouth daily.  [address] Tablet 1    insulin glargine (LANTUS,BASAGLAR) 100 unit/mL (3 mL) inpn 10 Units by SubCUTAneous route nightly. Indications: type 2 diabetes mellitus (Patient taking differently: 15 Units by SubCUTAneous route nightly. Indications: type 2 diabetes mellitus) 1 Adjustable Dose Pre-filled Pen Syringe 3    Insulin Needles, Disposable, 31 gauge x 5/16\" ndle Daily use 1 Package 11    multivitamin (ONE A DAY) tablet Take 1 Tab by mouth daily.        Past Medical History:   Diagnosis Date    Diabetes ([facility], [place] [address]     Hypercholesterolemia      Past Surgical History:   Procedure Laterality Date    COLONOSCOPY N/A 9/16/2020    COLONOSCOPY performed by Bev Nichols MD at [facility] AMBULATORY OR    HX CATARACT REMOVAL Bilateral 10/2021    HX COLONOSCOPY      HX OOPHORECTOMY Left      No Known Allergies    REVIEW OF SYSTEMS:                                        POSITIVE= bold text  Negative = regular text    General:                     fever, chills, sweats, generalized weakness, weight loss/gain,                                       loss of appetite   Eyes:                           blurred vision, eye pain, loss of vision, double vision  ENT:                            rhinorrhea, pharyngitis   Respiratory:               cough, sputum production, SOB, HERNANDEZ, wheezing, pleuritic pain   Cardiology:                chest pain, palpitations, orthopnea, PND, edema, syncope   Gastrointestinal:       abdominal pain , N/V, diarrhea, dysphagia, constipation, bleeding   Genitourinary:           frequency, urgency, dysuria, hematuria, incontinence   Muskuloskeletal :      arthralgia, myalgia, back pain  Hematology:              easy bruising, nose or gum bleeding, lymphadenopathy   Dermatological:         rash, ulceration, pruritis, color change / jaundice  Endocrine:                 hot flashes or polydipsia   Neurological:             headache, dizziness, confusion, focal weakness, paresthesia,                                      Speech difficulties, memory loss, gait difficulty  Psychological:          Feelings of anxiety, depression, agitation        Social History     Socioeconomic History    Marital status:    Tobacco Use    Smoking status: Never    Smokeless tobacco: Never   Vaping Use    Vaping Use: Never used   Substance and Sexual Activity    Alcohol use: Yes     Comment: Rarely    Drug use: Never    Sexual activity: Not Currently     Social Determinants of Health     Financial Resource Strain: Low Risk     Difficulty of Paying Living Expenses: Not hard at all   Food Insecurity: No Food Insecurity    Worried About Running Out of Food in the Last Year: Never true    Ran Out of Food in the Last Year: Never true     Family History   Problem Relation Age of Onset    Alzheimer's Disease Mother     Lung Disease Father     No Known Problems Sister     Other Sister         \"Blood Disorder\"    Diabetes Brother     No Known Problems Brother     No Known Problems Brother     No Known Problems Brother     No Known Problems Maternal Grandmother     No Known Problems Maternal Grandfather     No Known Problems Paternal Grandmother     No Known Problems Paternal Grandfather     Cancer Neg Hx        OBJECTIVE:     Visit Vitals  /74 (BP 1 Location: Left upper arm, BP Patient Position: Sitting, BP Cuff Size: Adult)   Pulse 79   Temp 97.9 °F (36.6 °C) (Oral)   Resp 16   Ht 5' 0.43\" (1.535 m)   Wt 146 lb 6.4 oz (66.4 kg)   SpO2 100%   BMI 28.19 kg/m²       Constitutional: She appears well nourished, of stated age, and dressed appropriately. Eyes: Sclera anicteric, PERRLA, EOMI  Neck: Supple without lymphadenopathy. Thyroid normal, No JVD or bruits  Respiratory: Clear to ascultation X5, normal inspiratory effort, no adventitious breath sounds.   Cardiovascular: Regular rate and rhythm, 2/6 systolic murmur heard loudest at aortic landmark, but without rubs or gallops, PMI not displaced, No thrills, no peripheral edema  Neuro: Non-focal exam, A & O X 3.   Psychiatric: Appropriate affect and demeanor, pleasant and cooperative. Patient's thought content and thought processing appear to be within normal limits. ASSESSMENT/PLAN:     ICD-10-CM ICD-9-CM    1. Type 2 diabetes mellitus with hyperglycemia, with long-term current use of insulin (HCC)  E11.65 250.00     Z79.4 790.29      V58.67       2. Mixed diabetic hyperlipidemia associated with type 2 diabetes mellitus (HCC)  E11.69 250.80     E78.2 272.2       3. Acquired hypothyroidism  E03.9 244.9 TSH 3RD GENERATION      T4, FREE      4. On statin therapy  Z79.899 V58.69         1: Labs ordered today include: TSH and free T4. Patient to return for fasting labs in 1 to 2 weeks. 2: Patient to continue levothyroxine 50 mcg daily for management of hypothyroidism. Patient tolerating well. 3: Continue current medications for management of type 2 diabetes, patient being managed by endocrinology. 4: Continue atorvastatin 80 mg daily for management of mixed hyperlipidemia. Patient tolerating well. 5: Continue healthy lifestyle management and appropriate dietary intake. Avoid concentrated sweets and excess carbohydrates in diet. Continue regular exercise patterns. 6: Patient to follow-up with me in approximately 4 months, or sooner as needed. Patient states understanding and agrees with plan. Orders Placed This Encounter    TSH 3RD GENERATION    T4, FREE         ATTENTION:   This medical record was transcribed using an electronic medical records system. Although proofread, it may and can contain electronic and spelling errors. Other human spelling and other errors may be present. Corrections may be executed at a later time. Please feel free to contact us for any clarifications as needed. Follow-up and Dispositions    Return in about 4 months (around 5/12/2023) for Follow-up with fasting labs. Jb Desir, SONDRA APRN FNP-BC

## 2023-01-12 NOTE — PROGRESS NOTES
Dede Lesches is a 75 y.o. female     Chief Complaint   Patient presents with    Diabetes     3M       Visit Vitals  /74 (BP 1 Location: Left upper arm, BP Patient Position: Sitting, BP Cuff Size: Adult)   Pulse 79   Temp 97.9 °F (36.6 °C) (Oral)   Resp 16   Ht 5' 0.43\" (1.535 m)   Wt 146 lb 6.4 oz (66.4 kg)   SpO2 100%   BMI 28.19 kg/m²       Health Maintenance Due   Topic Date Due    COVID-19 Vaccine (1) Never done    Pneumococcal 65+ years (1 - PCV) Never done    Eye Exam Retinal or Dilated  Never done    DTaP/Tdap/Td series (1 - Tdap) Never done    Shingles Vaccine (1 of 2) Never done         1. \"Have you been to the ER, urgent care clinic since your last visit? Hospitalized since your last visit? \" No    2. \"Have you seen or consulted any other health care providers outside of the [address] [place] [address] since your last visit? \" No     3. For patients aged 39-70: Has the patient had a colonoscopy / FIT/ Cologuard? Yes - no Care Gap present      If the patient is female:    4. For patients aged 41-77: Has the patient had a mammogram within the past 2 years? Yes - no Care Gap present      5. For patients aged 21-65: Has the patient had a pap smear?  NA - based on age or sex

## 2023-01-23 ENCOUNTER — APPOINTMENT (OUTPATIENT)
Dept: INTERNAL MEDICINE CLINIC | Age: 68
End: 2023-01-23

## 2023-01-23 DIAGNOSIS — E11.29 TYPE 2 DIABETES MELLITUS WITH MICROALBUMINURIA, WITH LONG-TERM CURRENT USE OF INSULIN (HCC): ICD-10-CM

## 2023-01-23 DIAGNOSIS — E03.9 ACQUIRED HYPOTHYROIDISM: ICD-10-CM

## 2023-01-23 DIAGNOSIS — E78.2 MIXED HYPERLIPIDEMIA: ICD-10-CM

## 2023-01-23 DIAGNOSIS — Z79.4 TYPE 2 DIABETES MELLITUS WITH MICROALBUMINURIA, WITH LONG-TERM CURRENT USE OF INSULIN (HCC): ICD-10-CM

## 2023-01-23 DIAGNOSIS — R80.9 TYPE 2 DIABETES MELLITUS WITH MICROALBUMINURIA, WITH LONG-TERM CURRENT USE OF INSULIN (HCC): ICD-10-CM

## 2023-01-23 LAB
T4 FREE SERPL-MCNC: 1.2 NG/DL (ref 0.8–1.5)
TSH SERPL DL<=0.05 MIU/L-ACNC: 2.55 UIU/ML (ref 0.36–3.74)

## 2023-03-15 ENCOUNTER — OFFICE VISIT (OUTPATIENT)
Dept: ENDOCRINOLOGY | Age: 68
End: 2023-03-15
Payer: MEDICARE

## 2023-03-15 VITALS
WEIGHT: 141 LBS | HEART RATE: 89 BPM | DIASTOLIC BLOOD PRESSURE: 72 MMHG | HEIGHT: 61 IN | SYSTOLIC BLOOD PRESSURE: 134 MMHG | BODY MASS INDEX: 26.62 KG/M2

## 2023-03-15 DIAGNOSIS — E03.9 ACQUIRED HYPOTHYROIDISM: ICD-10-CM

## 2023-03-15 DIAGNOSIS — E11.29 TYPE 2 DIABETES MELLITUS WITH MICROALBUMINURIA, WITH LONG-TERM CURRENT USE OF INSULIN (HCC): Primary | ICD-10-CM

## 2023-03-15 DIAGNOSIS — Z79.4 TYPE 2 DIABETES MELLITUS WITH MICROALBUMINURIA, WITH LONG-TERM CURRENT USE OF INSULIN (HCC): Primary | ICD-10-CM

## 2023-03-15 DIAGNOSIS — E78.2 MIXED HYPERLIPIDEMIA: ICD-10-CM

## 2023-03-15 DIAGNOSIS — R80.9 TYPE 2 DIABETES MELLITUS WITH MICROALBUMINURIA, WITH LONG-TERM CURRENT USE OF INSULIN (HCC): Primary | ICD-10-CM

## 2023-03-15 LAB — HBA1C MFR BLD HPLC: 7.1 %

## 2023-03-15 PROCEDURE — G8432 DEP SCR NOT DOC, RNG: HCPCS | Performed by: INTERNAL MEDICINE

## 2023-03-15 PROCEDURE — G8536 NO DOC ELDER MAL SCRN: HCPCS | Performed by: INTERNAL MEDICINE

## 2023-03-15 PROCEDURE — G8400 PT W/DXA NO RESULTS DOC: HCPCS | Performed by: INTERNAL MEDICINE

## 2023-03-15 PROCEDURE — 3051F HG A1C>EQUAL 7.0%<8.0%: CPT | Performed by: INTERNAL MEDICINE

## 2023-03-15 PROCEDURE — 2022F DILAT RTA XM EVC RTNOPTHY: CPT | Performed by: INTERNAL MEDICINE

## 2023-03-15 PROCEDURE — G8427 DOCREV CUR MEDS BY ELIG CLIN: HCPCS | Performed by: INTERNAL MEDICINE

## 2023-03-15 PROCEDURE — 1101F PT FALLS ASSESS-DOCD LE1/YR: CPT | Performed by: INTERNAL MEDICINE

## 2023-03-15 PROCEDURE — 83036 HEMOGLOBIN GLYCOSYLATED A1C: CPT | Performed by: INTERNAL MEDICINE

## 2023-03-15 PROCEDURE — 99214 OFFICE O/P EST MOD 30 MIN: CPT | Performed by: INTERNAL MEDICINE

## 2023-03-15 PROCEDURE — 1123F ACP DISCUSS/DSCN MKR DOCD: CPT | Performed by: INTERNAL MEDICINE

## 2023-03-15 PROCEDURE — G8417 CALC BMI ABV UP PARAM F/U: HCPCS | Performed by: INTERNAL MEDICINE

## 2023-03-15 PROCEDURE — 3017F COLORECTAL CA SCREEN DOC REV: CPT | Performed by: INTERNAL MEDICINE

## 2023-03-15 PROCEDURE — 1090F PRES/ABSN URINE INCON ASSESS: CPT | Performed by: INTERNAL MEDICINE

## 2023-03-15 PROCEDURE — G9899 SCRN MAM PERF RSLTS DOC: HCPCS | Performed by: INTERNAL MEDICINE

## 2023-03-15 PROCEDURE — 95251 CONT GLUC MNTR ANALYSIS I&R: CPT | Performed by: INTERNAL MEDICINE

## 2023-03-15 RX ORDER — PEN NEEDLE, DIABETIC 30 GX3/16"
NEEDLE, DISPOSABLE MISCELLANEOUS
Qty: 100 EACH | Refills: 3 | Status: SHIPPED | OUTPATIENT
Start: 2023-03-15

## 2023-03-15 NOTE — LETTER
3/15/2023    Patient: Marquita Boothe   YOB: 1955   Date of Visit: 3/15/2023     Elba Ritchie NP  Jessie Barrera  [facility] [place]  Via In Basket    Dear Elba Ritchie NP,      Thank you for referring Ms. Indira Oakley to [facility] for evaluation. My notes for this consultation are attached. If you have questions, please do not hesitate to call me. I look forward to following your patient along with you.       Sincerely,    Ruth Vazquez MD

## 2023-03-15 NOTE — PROGRESS NOTES
Chief Complaint   Patient presents with    Diabetes     Pcp and pharmacy verified    Thyroid Problem     History of Present Illness: Tony Bowie is a 75 y.o. female here for follow up of diabetes. She was diagnosed with diabetes \"About 37 years ago with my pregnancy, but it went away and came back in 2007\". At our initial visit in November 2022 she was taking Lantus 15 units HS, Metformin 500mg BID and Glipizide 5mg BID. Her A1C was 9.3% so I increased her Glipizide to 10mg BID. Pt denies any recent illnesses, injuries or hospitalizations. Pt is taking Lantus 15 units HA, Metformin 1000mg BID and Glipizide 10mg BID. Pt is using a DexCom CGM to monitor her BGs. I reviewed her CGM readings. Her BGs have been running in the 80-180s range, she has had some BGs in the 200s overnight. She has not had any issues of hypoglycemia overnight. Her A1C today was down to 7.1%. A typical day is as follows:  Pt wakes around 830-9AM  - She has breakfast around 1030-1130AM, today she had a bowl of cereal and iced tea. - She will have lunch 3-4 times per week, around 2PM, yesterday she did not have lunch. - She will occasionally have fruit for a mid-afternoon snack.  - She has dinner around 530-6PM, last night she had chicken noodle soup and water. - She denies having an evening snack. Exercise consists of \"dancing and I sometimes use my exercise bike\". \"I do exercises with my daughters by zoom for 30 minutes Mon-Thurs. \"    No history of vascular disease. No known family hx of CVA or CAD. She has occasional numbness in her feet and  she does have microalbuminuria. Last eye exam was 2021 \"I had cataract surgery by Dr. Ana Weinstein" Pt denies any hx of retinopathy. \"I have an appointment today. \"    She insists she takes her Lipitor 80mg every morning. She was diagnosed with Hypothyroidism \"For just a few months, because I have not been taking the medication for long\".   She take her LT4 50mcg every morning with her other AM meds. Pt reports that she has taken DM education classes in the past.    Current Outpatient Medications   Medication Sig    cholecalciferol, vitamin D3, (VITAMIN D3 PO) Take 1 Capsule by mouth daily. atorvastatin (LIPITOR) 80 mg tablet TAKE 1 TABLET BY MOUTH  DAILY    levothyroxine (SYNTHROID) 50 mcg tablet TAKE 1 TABLET BY MOUTH  DAILY BEFORE BREAKFAST FOR  A CONDITION WITH LOW  THYROID HORMONE LEVELS    glipiZIDE (GLUCOTROL) 10 mg tablet TAKE 1 TABLET BY MOUTH TWICE  DAILY    metFORMIN (GLUCOPHAGE) 500 mg tablet TAKE 2 TABLETS BY MOUTH  TWICE DAILY WITH MEALS    blood-glucose meter,continuous (DEXCOM G6 )     insulin glargine (LANTUS,BASAGLAR) 100 unit/mL (3 mL) inpn 10 Units by SubCUTAneous route nightly. Indications: type 2 diabetes mellitus (Patient taking differently: 12 Units by SubCUTAneous route nightly. Indications: type 2 diabetes mellitus)    Insulin Needles, Disposable, 31 gauge x 5/16\" ndle Daily use    multivitamin (ONE A DAY) tablet Take 1 Tab by mouth daily. No current facility-administered medications for this visit. No Known Allergies  Review of Systems:  - Eyes: no blurry vision or double vision  - Cardiovascular: no chest pain  - Respiratory: no shortness of breath  - Musculoskeletal: no myalgias  - Neurological: occasional numbness/tingling in extremities    Physical Examination:  Blood pressure 134/72, pulse 89, height 5' 0.53\" (1.537 m), weight 141 lb (64 kg).   General: pleasant, no distress, good eye contact   Neck: no carotid bruits  Cardiovascular: regular, normal rate, nl s1 and s2, no m/r/g, 2+ DP pulses   Respiratory: clear bilaterally  Integumentary: no edema, no foot ulcers,   Psychiatric: normal mood and affect    Diabetic foot exam:     Left Foot:   Visual Exam: normal    Pulse DP: 2+ (normal)   Filament test: normal sensation    Vibratory sensation: normal      Right Foot:   Visual Exam: normal    Pulse DP: 2+ (normal)   Filament test: normal sensation    Vibratory sensation: normal      Data Reviewed:   Her A1C today was down to 7.1%. Assessment/Plan:   1) DM > Her A1C today was down to 7.1%. Pt encouraged to keep up the good work and continue the Lantus 15 units HS, Metformin 1000mg BID and Glipizide 10mg BID. We also discussed the importance of reducing how much fruit she eats to only 1-2 servings per day. With her hx of neuropathy and calluses, she would benefit from DM shoes and inserts. 2) Hypothyroidism > Pt is clinically euthyroid on LT4 50mcg daily. Pt to continue the LT4 50mcg daily. 3) HLD > Her LDL in October 2022 was 152 and her TC was 228. Pt reports that her PCP increased her Atorvastatin to 80mg daily recently. She insists she has been adherent with her Atorvastatin. I will repeat her lipid panel and CMP for our next visit. Pt voices understanding and agreement with the plan.     RTC 4 months    Copy sent to:  Catalino Springer

## 2023-04-23 DIAGNOSIS — Z79.4 TYPE 2 DIABETES MELLITUS WITH MICROALBUMINURIA, WITH LONG-TERM CURRENT USE OF INSULIN (HCC): Primary | ICD-10-CM

## 2023-04-23 DIAGNOSIS — R80.9 TYPE 2 DIABETES MELLITUS WITH MICROALBUMINURIA, WITH LONG-TERM CURRENT USE OF INSULIN (HCC): Primary | ICD-10-CM

## 2023-04-23 DIAGNOSIS — E11.29 TYPE 2 DIABETES MELLITUS WITH MICROALBUMINURIA, WITH LONG-TERM CURRENT USE OF INSULIN (HCC): Primary | ICD-10-CM

## 2023-04-23 DIAGNOSIS — E78.2 MIXED HYPERLIPIDEMIA: ICD-10-CM

## 2023-04-28 DIAGNOSIS — Z79.4 TYPE 2 DIABETES MELLITUS WITH MICROALBUMINURIA, WITH LONG-TERM CURRENT USE OF INSULIN (HCC): Primary | ICD-10-CM

## 2023-04-28 DIAGNOSIS — E11.29 TYPE 2 DIABETES MELLITUS WITH MICROALBUMINURIA, WITH LONG-TERM CURRENT USE OF INSULIN (HCC): Primary | ICD-10-CM

## 2023-04-28 DIAGNOSIS — R80.9 TYPE 2 DIABETES MELLITUS WITH MICROALBUMINURIA, WITH LONG-TERM CURRENT USE OF INSULIN (HCC): Primary | ICD-10-CM

## 2023-04-28 DIAGNOSIS — E78.2 MIXED HYPERLIPIDEMIA: ICD-10-CM

## 2023-05-12 ENCOUNTER — OFFICE VISIT (OUTPATIENT)
Facility: CLINIC | Age: 68
End: 2023-05-12
Payer: MEDICAID

## 2023-05-12 VITALS
HEART RATE: 84 BPM | TEMPERATURE: 98.2 F | BODY MASS INDEX: 27.08 KG/M2 | RESPIRATION RATE: 16 BRPM | DIASTOLIC BLOOD PRESSURE: 78 MMHG | SYSTOLIC BLOOD PRESSURE: 126 MMHG | WEIGHT: 143.4 LBS | OXYGEN SATURATION: 100 % | HEIGHT: 61 IN

## 2023-05-12 DIAGNOSIS — E11.69 MIXED HYPERLIPIDEMIA DUE TO TYPE 2 DIABETES MELLITUS (HCC): ICD-10-CM

## 2023-05-12 DIAGNOSIS — E03.9 ACQUIRED HYPOTHYROIDISM: ICD-10-CM

## 2023-05-12 DIAGNOSIS — E55.9 VITAMIN D DEFICIENCY, UNSPECIFIED: ICD-10-CM

## 2023-05-12 DIAGNOSIS — Z79.4 TYPE 2 DIABETES MELLITUS WITH HYPERGLYCEMIA, WITH LONG-TERM CURRENT USE OF INSULIN (HCC): Primary | ICD-10-CM

## 2023-05-12 DIAGNOSIS — D64.9 ANEMIA, UNSPECIFIED TYPE: ICD-10-CM

## 2023-05-12 DIAGNOSIS — E78.2 MIXED HYPERLIPIDEMIA DUE TO TYPE 2 DIABETES MELLITUS (HCC): ICD-10-CM

## 2023-05-12 DIAGNOSIS — E11.65 TYPE 2 DIABETES MELLITUS WITH HYPERGLYCEMIA, WITH LONG-TERM CURRENT USE OF INSULIN (HCC): Primary | ICD-10-CM

## 2023-05-12 PROCEDURE — 1123F ACP DISCUSS/DSCN MKR DOCD: CPT | Performed by: NURSE PRACTITIONER

## 2023-05-12 PROCEDURE — 99213 OFFICE O/P EST LOW 20 MIN: CPT | Performed by: NURSE PRACTITIONER

## 2023-05-12 SDOH — ECONOMIC STABILITY: HOUSING INSECURITY
IN THE LAST 12 MONTHS, WAS THERE A TIME WHEN YOU DID NOT HAVE A STEADY PLACE TO SLEEP OR SLEPT IN A SHELTER (INCLUDING NOW)?: NO

## 2023-05-12 SDOH — ECONOMIC STABILITY: FOOD INSECURITY: WITHIN THE PAST 12 MONTHS, THE FOOD YOU BOUGHT JUST DIDN'T LAST AND YOU DIDN'T HAVE MONEY TO GET MORE.: NEVER TRUE

## 2023-05-12 SDOH — ECONOMIC STABILITY: INCOME INSECURITY: HOW HARD IS IT FOR YOU TO PAY FOR THE VERY BASICS LIKE FOOD, HOUSING, MEDICAL CARE, AND HEATING?: NOT HARD AT ALL

## 2023-05-12 SDOH — ECONOMIC STABILITY: FOOD INSECURITY: WITHIN THE PAST 12 MONTHS, YOU WORRIED THAT YOUR FOOD WOULD RUN OUT BEFORE YOU GOT MONEY TO BUY MORE.: NEVER TRUE

## 2023-05-12 ASSESSMENT — PATIENT HEALTH QUESTIONNAIRE - PHQ9
SUM OF ALL RESPONSES TO PHQ QUESTIONS 1-9: 0
1. LITTLE INTEREST OR PLEASURE IN DOING THINGS: 0
2. FEELING DOWN, DEPRESSED OR HOPELESS: 0
SUM OF ALL RESPONSES TO PHQ QUESTIONS 1-9: 0
SUM OF ALL RESPONSES TO PHQ9 QUESTIONS 1 & 2: 0

## 2023-05-12 ASSESSMENT — ANXIETY QUESTIONNAIRES
4. TROUBLE RELAXING: 0
1. FEELING NERVOUS, ANXIOUS, OR ON EDGE: 0
5. BEING SO RESTLESS THAT IT IS HARD TO SIT STILL: 0
2. NOT BEING ABLE TO STOP OR CONTROL WORRYING: 0
GAD7 TOTAL SCORE: 0
7. FEELING AFRAID AS IF SOMETHING AWFUL MIGHT HAPPEN: 0
6. BECOMING EASILY ANNOYED OR IRRITABLE: 0
IF YOU CHECKED OFF ANY PROBLEMS ON THIS QUESTIONNAIRE, HOW DIFFICULT HAVE THESE PROBLEMS MADE IT FOR YOU TO DO YOUR WORK, TAKE CARE OF THINGS AT HOME, OR GET ALONG WITH OTHER PEOPLE: NOT DIFFICULT AT ALL
3. WORRYING TOO MUCH ABOUT DIFFERENT THINGS: 0

## 2023-05-12 NOTE — PROGRESS NOTES
Rosaline Marie is a 75 y.o. female     Chief Complaint   Patient presents with    Diabetes     4m       /78 (Site: Left Upper Arm, Position: Sitting, Cuff Size: Large Adult)   Pulse 84   Temp 98.2 °F (36.8 °C) (Oral)   Resp 16   Ht 5' 0.53\" (1.537 m)   Wt 143 lb 6.4 oz (65 kg)   SpO2 100%   BMI 27.52 kg/m²     Health Maintenance Due   Topic Date Due    COVID-19 Vaccine (1) Never done    Pneumococcal 65+ years Vaccine (1 - PCV) Never done    DTaP/Tdap/Td vaccine (1 - Tdap) Never done    Shingles vaccine (1 of 2) Never done         1. \"Have you been to the ER, urgent care clinic since your last visit? Hospitalized since your last visit? \" No    2. \"Have you seen or consulted any other health care providers outside of the [address] [place] [address] since your last visit? \" No     3. For patients aged 39-70: Has the patient had a colonoscopy / FIT/ Cologuard? Yes      If the patient is female:    4. For patients aged 41-77: Has the patient had a mammogram within the past 2 years? Yes       5. For patients aged 21-65: Has the patient had a pap smear?  N/A

## 2023-05-12 NOTE — PROGRESS NOTES
Chief Complaint   Patient presents with    Diabetes     4m       SUBJECTIVE:    Harika Matias is a 75 y.o. female who is here today for a follow up appointment regarding current medical conditions including: Type 2 diabetes with long-term use of insulin mixed hyperlipidemia, acquired hypothyroidism, vitamin D deficiency, and chronic anemia. She states she is feeling well overall, and denies any new or acute complaints. She is currently being followed by endocrinology (Dr. Filippo Chester) for management of her type 2 diabetes as well as cholesterol. She has pending labs for CMP, A1c, and lipid panel to be done on 07/01/2023. Her last seen in-office hemoglobin A1c was approximately 7.1%. She is currently using a Dexcom continuous monitoring system and denies any hypoglycemic episodes. She is using a combination of glipizide, metformin, Lantus, and atorvastatin for her cholesterol. She has been tolerating this well. She continues to take levothyroxine 50 mcg daily for management of her hypothyroidism. She has been tolerating this well without adverse side effects. She states she is being watchful with her diet, avoiding offending foods, and exercising Monday through Thursdays of each week on a regular basis. She denies any recent episodes of chest pain, chest pressure, shortness of breath, headaches, dizziness, blurred vision, palpitations, or syncope episodes. Previous notes and labs from endocrinology office were reviewed prior to patient encounter today. We will discuss findings today.       Current Outpatient Medications   Medication Sig Dispense Refill    atorvastatin (LIPITOR) 80 MG tablet Take 1 tablet by mouth daily      glipiZIDE (GLUCOTROL) 10 MG tablet Take 1 tablet by mouth 2 times daily      insulin glargine (LANTUS SOLOSTAR) 100 UNIT/ML injection pen Inject 10 Units into the skin      levothyroxine (SYNTHROID) 50 MCG tablet TAKE 1 TABLET BY MOUTH  DAILY BEFORE BREAKFAST FOR  A

## 2023-05-26 RX ORDER — ATORVASTATIN CALCIUM 80 MG/1
TABLET, FILM COATED ORAL
Qty: 90 TABLET | Refills: 1 | Status: SHIPPED | OUTPATIENT
Start: 2023-05-26

## 2023-05-26 RX ORDER — LEVOTHYROXINE SODIUM 0.05 MG/1
TABLET ORAL
Qty: 90 TABLET | Refills: 1 | Status: SHIPPED | OUTPATIENT
Start: 2023-05-26

## 2023-07-01 DIAGNOSIS — E78.2 MIXED HYPERLIPIDEMIA: ICD-10-CM

## 2023-07-01 DIAGNOSIS — E11.29 TYPE 2 DIABETES MELLITUS WITH MICROALBUMINURIA, WITH LONG-TERM CURRENT USE OF INSULIN (HCC): ICD-10-CM

## 2023-07-01 DIAGNOSIS — E03.9 ACQUIRED HYPOTHYROIDISM: ICD-10-CM

## 2023-07-01 DIAGNOSIS — Z79.4 TYPE 2 DIABETES MELLITUS WITH MICROALBUMINURIA, WITH LONG-TERM CURRENT USE OF INSULIN (HCC): ICD-10-CM

## 2023-07-01 DIAGNOSIS — R80.9 TYPE 2 DIABETES MELLITUS WITH MICROALBUMINURIA, WITH LONG-TERM CURRENT USE OF INSULIN (HCC): ICD-10-CM

## 2023-07-21 RX ORDER — INSULIN GLARGINE 100 [IU]/ML
INJECTION, SOLUTION SUBCUTANEOUS
Qty: 15 ML | Refills: 2 | Status: SHIPPED | OUTPATIENT
Start: 2023-07-21

## 2023-07-21 NOTE — TELEPHONE ENCOUNTER
PCP: MORRIS Ramos NP    Last appt: 5/12/2023  Future Appointments   Date Time Provider [address]   7/28/2023 12:10 PM Sabrina Jane MD [facility] [address] [facility] AMB   9/12/2023 10:30 AM MORRIS Ramos NP [facility] AMB       Requested Prescriptions     Pending Prescriptions Disp Refills    LANTUS SOLOSTAR 100 UNIT/ML injection pen [Pharmacy Med Name: Lantus SoloStar 100 UNIT/ML Subcutaneous Solution Pen-injector] 15 mL 2     Sig: INJECT SUBCUTANEOUSLY 10  UNITS AT NIGHT FOR TYPE 2  DIABETES MELLITUS       Prior labs and Blood pressures:  BP Readings from Last 3 Encounters:   05/12/23 126/78   03/15/23 134/72   01/12/23 132/74     Lab Results   Component Value Date/Time     10/07/2022 09:30 AM    K 3.9 10/07/2022 09:30 AM     10/07/2022 09:30 AM    CO2 28 10/07/2022 09:30 AM    BUN 12 10/07/2022 09:30 AM    GFRAA >60 06/22/2022 11:00 AM     Lab Results   Component Value Date/Time    TKP1LFIW 7.1 03/15/2023 12:05 PM     Lab Results   Component Value Date/Time    CHOL 228 10/07/2022 09:30 AM    HDL 46 10/07/2022 09:30 AM    VLDL 23 03/29/2021 10:48 AM     No results found for: VITD3, VD3RIA        Lab Results   Component Value Date/Time    TSH 2.55 01/23/2023 10:26 AM

## 2023-07-28 ENCOUNTER — OFFICE VISIT (OUTPATIENT)
Age: 68
End: 2023-07-28

## 2023-07-28 VITALS
DIASTOLIC BLOOD PRESSURE: 64 MMHG | SYSTOLIC BLOOD PRESSURE: 132 MMHG | WEIGHT: 141.1 LBS | HEIGHT: 61 IN | HEART RATE: 87 BPM | BODY MASS INDEX: 26.64 KG/M2

## 2023-07-28 DIAGNOSIS — E11.29 TYPE 2 DIABETES MELLITUS WITH OTHER DIABETIC KIDNEY COMPLICATION (HCC): Primary | ICD-10-CM

## 2023-07-28 DIAGNOSIS — E03.9 ACQUIRED HYPOTHYROIDISM: ICD-10-CM

## 2023-07-28 DIAGNOSIS — E78.2 MIXED HYPERLIPIDEMIA: ICD-10-CM

## 2023-07-28 NOTE — PROGRESS NOTES
Chief Complaint   Patient presents with    Diabetes     Pcp and pharmacy verified    Thyroid Problem     History of Present Illness: Paola Lowe is a 75 y.o. female here for follow up of diabetes. She was diagnosed with diabetes at the age of 26 \"with my pregnancy, but it went away and came back in 2007\". At our initial visit in November 2022 she was taking Lantus 15 units HS, Metformin 500mg BID and Glipizide 5mg BID. Her A1C was 9.3% so I increased her Glipizide to 10mg BID. At our last visit her A1C was down to 7.1% on Lantus 15 units HS, Metformin 1000mg BID and Glipizide 10mg BID. Pt was encouraged to keep up the good work and reduce how much fruit she was eating. Pt denies any recent illnesses, injuries or hospitalizations. Pt is taking Lantus, but she has decreased the dose to 12 units HS, she is taking Glipizide 10mg BID. She stopped taking the Metformin \"It has been about a month. \"  \"Instead of the Metformin I am taking a supplement Called [facility] [employer]. My daughter's friend lives in [facility] and he told me to take it. \"    She uses the DexCom CGM to monitor her BG. I reviewed her last 30 days of CGM readings            Her A1C today was up to 8.6%. A typical day is as follows:  Pt wakes around 9AM  - She has breakfast around 1030-1130AM, she does not recall what she had for breakfast.    - She will have around 2PM, yesterday she had \"a few oranges and water\". - She will occasionally have fruit for a mid-afternoon snack.  - She has dinner around 6PM, last night she had lamb, mac and cheese, cabbage and water. - She denies having an evening snack. Exercise consists of \"dancing and I sometimes use my exercise bike\". \"I do exercises with my daughters by zoom for 30 minutes Mon-Thurs. \"    No history of vascular disease. No known family hx of CVA or CAD. She has occasional numbness in her feet and  she does have microalbuminuria.       Last eye exam was 2021 \"I had cataract

## 2023-07-28 NOTE — PATIENT INSTRUCTIONS
1) Your A1C today was 8.6%. I recommend you increase your Lantus from 12 units up to 15 units every day.

## 2023-07-31 NOTE — TELEPHONE ENCOUNTER
Requested Prescriptions     Pending Prescriptions Disp Refills    metFORMIN (GLUCOPHAGE) 500 MG tablet [Pharmacy Med Name: metFORMIN HCl 500 MG Oral Tablet] 400 tablet 2     Sig: TAKE 2 TABLETS BY MOUTH TWICE  DAILY WITH MEALS         RX refill request from the patient/pharmacy. Patient last seen 5/12/23 with labs, and next appt. scheduled for 9/12/23.

## 2023-08-29 RX ORDER — LEVOTHYROXINE SODIUM 0.05 MG/1
TABLET ORAL
Qty: 100 TABLET | Refills: 1 | Status: SHIPPED | OUTPATIENT
Start: 2023-08-29

## 2023-08-29 RX ORDER — ATORVASTATIN CALCIUM 80 MG/1
TABLET, FILM COATED ORAL
Qty: 100 TABLET | Refills: 1 | Status: SHIPPED | OUTPATIENT
Start: 2023-08-29

## 2023-08-29 NOTE — TELEPHONE ENCOUNTER
PCP: MORRIS Aguilar NP    Last appt: 5/12/2023  Future Appointments   Date Time Provider [address]   9/12/2023 10:30 AM MORRIS Aguilar NP [facility] AMB   11/24/2023 11:50 AM Haydee Bassett MD [facility] [address] [facility] AMB       Requested Prescriptions     Pending Prescriptions Disp Refills    levothyroxine (SYNTHROID) 50 MCG tablet [Pharmacy Med Name: Idelia Damion TABLET] 90 tablet 2     Sig: TAKE 1 TABLET BY MOUTH DAILY  BEFORE BREAKFAST FOR A CONDITION WITH LOW THYROID HORMONE LEVELS    atorvastatin (LIPITOR) 80 MG tablet [Pharmacy Med Name: Atorvastatin Calcium 80 MG Oral Tablet] 90 tablet 2     Sig: TAKE 1 TABLET BY MOUTH ONCE  DAILY       Prior labs and Blood pressures:  BP Readings from Last 3 Encounters:   07/28/23 132/64   05/12/23 126/78   03/15/23 134/72     Lab Results   Component Value Date/Time     10/07/2022 09:30 AM    K 3.9 10/07/2022 09:30 AM     10/07/2022 09:30 AM    CO2 28 10/07/2022 09:30 AM    BUN 12 10/07/2022 09:30 AM    GFRAA >60 06/22/2022 11:00 AM     Lab Results   Component Value Date/Time    YWB5ZTDW 7.1 03/15/2023 12:05 PM     Lab Results   Component Value Date/Time    CHOL 228 10/07/2022 09:30 AM    HDL 46 10/07/2022 09:30 AM    VLDL 23 03/29/2021 10:48 AM     No results found for: VITD3, VD3RIA        Lab Results   Component Value Date/Time    TSH 2.55 01/23/2023 10:26 AM

## 2023-09-12 ENCOUNTER — OFFICE VISIT (OUTPATIENT)
Facility: CLINIC | Age: 68
End: 2023-09-12
Payer: MEDICAID

## 2023-09-12 VITALS
BODY MASS INDEX: 26.58 KG/M2 | HEART RATE: 71 BPM | WEIGHT: 140.8 LBS | SYSTOLIC BLOOD PRESSURE: 124 MMHG | RESPIRATION RATE: 16 BRPM | OXYGEN SATURATION: 100 % | TEMPERATURE: 98.2 F | DIASTOLIC BLOOD PRESSURE: 70 MMHG | HEIGHT: 61 IN

## 2023-09-12 DIAGNOSIS — D64.9 ANEMIA, UNSPECIFIED TYPE: ICD-10-CM

## 2023-09-12 DIAGNOSIS — Z79.4 TYPE 2 DIABETES MELLITUS WITH MICROALBUMINURIA, WITH LONG-TERM CURRENT USE OF INSULIN (HCC): ICD-10-CM

## 2023-09-12 DIAGNOSIS — E03.9 ACQUIRED HYPOTHYROIDISM: ICD-10-CM

## 2023-09-12 DIAGNOSIS — E11.29 TYPE 2 DIABETES MELLITUS WITH MICROALBUMINURIA, WITH LONG-TERM CURRENT USE OF INSULIN (HCC): ICD-10-CM

## 2023-09-12 DIAGNOSIS — E78.2 MIXED HYPERLIPIDEMIA: ICD-10-CM

## 2023-09-12 DIAGNOSIS — E11.65 TYPE 2 DIABETES MELLITUS WITH HYPERGLYCEMIA, WITH LONG-TERM CURRENT USE OF INSULIN (HCC): ICD-10-CM

## 2023-09-12 DIAGNOSIS — R80.9 TYPE 2 DIABETES MELLITUS WITH MICROALBUMINURIA, WITH LONG-TERM CURRENT USE OF INSULIN (HCC): ICD-10-CM

## 2023-09-12 DIAGNOSIS — E78.2 MIXED HYPERLIPIDEMIA DUE TO TYPE 2 DIABETES MELLITUS (HCC): ICD-10-CM

## 2023-09-12 DIAGNOSIS — E11.69 MIXED HYPERLIPIDEMIA DUE TO TYPE 2 DIABETES MELLITUS (HCC): ICD-10-CM

## 2023-09-12 DIAGNOSIS — E11.29 TYPE 2 DIABETES MELLITUS WITH OTHER DIABETIC KIDNEY COMPLICATION (HCC): ICD-10-CM

## 2023-09-12 DIAGNOSIS — Z79.4 TYPE 2 DIABETES MELLITUS WITH HYPERGLYCEMIA, WITH LONG-TERM CURRENT USE OF INSULIN (HCC): ICD-10-CM

## 2023-09-12 PROCEDURE — 1123F ACP DISCUSS/DSCN MKR DOCD: CPT | Performed by: NURSE PRACTITIONER

## 2023-09-12 PROCEDURE — 99213 OFFICE O/P EST LOW 20 MIN: CPT | Performed by: NURSE PRACTITIONER

## 2023-09-12 NOTE — PROGRESS NOTES
Chief Complaint   Patient presents with    Diabetes     3M       SUBJECTIVE:    Deuce Allen is a 70 y.o. female who is here today for a follow up appointment regarding current medical conditions including: Type 2 diabetes with long-term use of insulin, mixed hyperlipidemia, acquired hypothyroidism, and anemia. She states she is feeling relatively well overall and denies any new or acute complaints at this time. The patient is being followed by endocrinology (Dr. Evans Matson) for management of her type 2 diabetes. She states she had a recent increase in her Lantus at night to 15 units. She continues to use a Dexcom continuous monitoring system and checks her blood sugars routinely. She has had no significant episodes of hypoglycemia or significantly elevated blood sugar readings. She states she is mostly compliant with her diet as well. She continues to take glipizide as well as metformin and tolerating these well. Patient is also on atorvastatin due to her mixed hyperlipidemia and tolerating this without issue. She denies any recent episodes of chest pain, chest pressure, shortness of breath, headaches, dizziness, blurred vision, palpitations, or syncope episodes. She is currently on levothyroxine 50 mcg daily due to her hypothyroidism. She denies any adverse side effects of the medication.       Current Outpatient Medications   Medication Sig Dispense Refill    levothyroxine (SYNTHROID) 50 MCG tablet TAKE 1 TABLET BY MOUTH DAILY  BEFORE BREAKFAST FOR A CONDITION WITH LOW THYROID HORMONE LEVELS 100 tablet 1    atorvastatin (LIPITOR) 80 MG tablet TAKE 1 TABLET BY MOUTH ONCE  DAILY 100 tablet 1    LANTUS SOLOSTAR 100 UNIT/ML injection pen INJECT SUBCUTANEOUSLY 10  UNITS AT NIGHT FOR TYPE 2  DIABETES MELLITUS (Patient taking differently: 15 Units nightly) 15 mL 2    glipiZIDE (GLUCOTROL) 10 MG tablet Take 1 tablet by mouth 2 times daily      vitamin D 25 MCG (1000 UT) CAPS Take 1 capsule by mouth daily

## 2023-09-13 LAB
ALBUMIN SERPL-MCNC: 4.2 G/DL (ref 3.9–4.9)
ALBUMIN/GLOB SERPL: 1.5 {RATIO} (ref 1.2–2.2)
ALP SERPL-CCNC: 131 IU/L (ref 44–121)
ALT SERPL-CCNC: 17 IU/L (ref 0–32)
AST SERPL-CCNC: 18 IU/L (ref 0–40)
BILIRUB SERPL-MCNC: 0.6 MG/DL (ref 0–1.2)
BUN SERPL-MCNC: 11 MG/DL (ref 8–27)
BUN/CREAT SERPL: 16 (ref 12–28)
CALCIUM SERPL-MCNC: 9.5 MG/DL (ref 8.7–10.3)
CHLORIDE SERPL-SCNC: 105 MMOL/L (ref 96–106)
CHOLEST SERPL-MCNC: 214 MG/DL (ref 100–199)
CO2 SERPL-SCNC: 22 MMOL/L (ref 20–29)
CREAT SERPL-MCNC: 0.67 MG/DL (ref 0.57–1)
EGFRCR SERPLBLD CKD-EPI 2021: 95 ML/MIN/1.73
ERYTHROCYTE [DISTWIDTH] IN BLOOD BY AUTOMATED COUNT: 13.8 % (ref 11.7–15.4)
GLOBULIN SER CALC-MCNC: 2.8 G/DL (ref 1.5–4.5)
GLUCOSE SERPL-MCNC: 208 MG/DL (ref 70–99)
HBA1C MFR BLD: 9.3 % (ref 4.8–5.6)
HCT VFR BLD AUTO: 33.7 % (ref 34–46.6)
HDLC SERPL-MCNC: 47 MG/DL
HGB BLD-MCNC: 10.8 G/DL (ref 11.1–15.9)
IRON SATN MFR SERPL: 24 % (ref 15–55)
IRON SERPL-MCNC: 67 UG/DL (ref 27–139)
LDLC SERPL CALC-MCNC: 145 MG/DL (ref 0–99)
MCH RBC QN AUTO: 27.3 PG (ref 26.6–33)
MCHC RBC AUTO-ENTMCNC: 32 G/DL (ref 31.5–35.7)
MCV RBC AUTO: 85 FL (ref 79–97)
PLATELET # BLD AUTO: 214 X10E3/UL (ref 150–450)
POTASSIUM SERPL-SCNC: 4.5 MMOL/L (ref 3.5–5.2)
PROT SERPL-MCNC: 7 G/DL (ref 6–8.5)
RBC # BLD AUTO: 3.96 X10E6/UL (ref 3.77–5.28)
SODIUM SERPL-SCNC: 143 MMOL/L (ref 134–144)
T4 FREE SERPL-MCNC: 1.24 NG/DL (ref 0.82–1.77)
TIBC SERPL-MCNC: 282 UG/DL (ref 250–450)
TRIGL SERPL-MCNC: 124 MG/DL (ref 0–149)
TSH SERPL DL<=0.005 MIU/L-ACNC: 3.28 UIU/ML (ref 0.45–4.5)
UIBC SERPL-MCNC: 215 UG/DL (ref 118–369)
VLDLC SERPL CALC-MCNC: 22 MG/DL (ref 5–40)
WBC # BLD AUTO: 4.3 X10E3/UL (ref 3.4–10.8)

## 2023-09-14 LAB
ALBUMIN/CREAT UR: 311 MG/G CREAT (ref 0–29)
CREAT UR-MCNC: 85.4 MG/DL
IMP & REVIEW OF LAB RESULTS: NORMAL
MICROALBUMIN UR-MCNC: 265.6 UG/ML

## 2023-10-27 NOTE — TELEPHONE ENCOUNTER
Last Refill: 7-31-23  Last Visit: 9/12/2023   Next Visit: 11/30/2023     Requested Prescriptions     Pending Prescriptions Disp Refills    metFORMIN (GLUCOPHAGE) 500 MG tablet [Pharmacy Med Name: metFORMIN HCl 500 MG Oral Tablet] 400 tablet 2     Sig: TAKE 2 TABLETS BY MOUTH TWICE  DAILY WITH MEALS

## 2023-11-24 ENCOUNTER — OFFICE VISIT (OUTPATIENT)
Age: 68
End: 2023-11-24

## 2023-11-24 VITALS
WEIGHT: 143.6 LBS | BODY MASS INDEX: 27.11 KG/M2 | SYSTOLIC BLOOD PRESSURE: 123 MMHG | HEART RATE: 91 BPM | HEIGHT: 61 IN | DIASTOLIC BLOOD PRESSURE: 73 MMHG

## 2023-11-24 DIAGNOSIS — E78.2 MIXED HYPERLIPIDEMIA: ICD-10-CM

## 2023-11-24 DIAGNOSIS — E11.29 TYPE 2 DIABETES MELLITUS WITH OTHER DIABETIC KIDNEY COMPLICATION (HCC): Primary | ICD-10-CM

## 2023-11-24 DIAGNOSIS — E03.9 ACQUIRED HYPOTHYROIDISM: ICD-10-CM

## 2023-11-24 RX ORDER — GLIPIZIDE 10 MG/1
10 TABLET ORAL 2 TIMES DAILY
Qty: 180 TABLET | Refills: 1 | Status: SHIPPED | OUTPATIENT
Start: 2023-11-24

## 2023-11-24 NOTE — PROGRESS NOTES
Chief Complaint   Patient presents with    Diabetes    Thyroid Problem     Pcp and pharmacy verified     History of Present Illness: Elida Goldsmith is a 70 y.o. female here for follow up of diabetes. She was diagnosed with diabetes at the age of 26 \"with my pregnancy, but it went away and came back in 2007\". Her A1C in September has increased up to 9.3%. Pt denies any recent illnesses, injuries or hospitalizations. For her DM she is currently taking \"I take the metformin sometimes\" she reports she is taking the Glipizide 10mg BID and Lantus \"I have not taken it in 2-3 days. I told my PCP that sometimes I feel uncomfortable because it stung\"  She notes she will take the Lantus \"only on occasions\". She is using the DexCom CGM to monitor her BG. I reviewed her last 30 days of CGM readings            Exercise consists of \"I sometimes use my exercise bike\". \"I do exercises with my daughters by zoom for 30 minutes Mon-Thurs. \"    No history of vascular disease. No known family hx of CVA or CAD. She has occasional numbness in her feet and  she does have microalbuminuria. Last eye exam was 2021 \"I had cataract surgery by Dr. Gutierrez [facility]" Pt denies any hx of retinopathy. \"I have an appointment in [facility]\"     She will take her Atorvastatin 80mg about 4 times per week. She take her LT4 50mcg every morning with her other AM meds.     Pt reports that she has taken DM education classes in the past.    Current Outpatient Medications   Medication Sig    glipiZIDE (GLUCOTROL) 10 MG tablet Take 1 tablet by mouth 2 times daily    Insulin Degludec 100 UNIT/ML SOPN Take 18 units every morning    metFORMIN (GLUCOPHAGE) 500 MG tablet TAKE 2 TABLETS BY MOUTH TWICE  DAILY WITH MEALS    vitamin D 25 MCG (1000 UT) CAPS Take 1 capsule by mouth daily    levothyroxine (SYNTHROID) 50 MCG tablet TAKE 1 TABLET BY MOUTH DAILY  BEFORE BREAKFAST FOR A CONDITION WITH LOW THYROID HORMONE LEVELS    atorvastatin (LIPITOR) 80 MG tablet

## 2023-11-24 NOTE — PATIENT INSTRUCTIONS
I am changing the Lantus to a different insulin called [place]. You will take 18 units every morning.

## 2023-11-30 ENCOUNTER — OFFICE VISIT (OUTPATIENT)
Facility: CLINIC | Age: 68
End: 2023-11-30
Payer: MEDICAID

## 2023-11-30 VITALS
BODY MASS INDEX: 28.7 KG/M2 | OXYGEN SATURATION: 99 % | HEIGHT: 60 IN | TEMPERATURE: 98.1 F | SYSTOLIC BLOOD PRESSURE: 124 MMHG | RESPIRATION RATE: 16 BRPM | DIASTOLIC BLOOD PRESSURE: 66 MMHG | WEIGHT: 146.2 LBS | HEART RATE: 97 BPM

## 2023-11-30 DIAGNOSIS — E11.69 MIXED HYPERLIPIDEMIA DUE TO TYPE 2 DIABETES MELLITUS (HCC): ICD-10-CM

## 2023-11-30 DIAGNOSIS — E03.9 ACQUIRED HYPOTHYROIDISM: ICD-10-CM

## 2023-11-30 DIAGNOSIS — Z79.4 TYPE 2 DIABETES MELLITUS WITH HYPERGLYCEMIA, WITH LONG-TERM CURRENT USE OF INSULIN (HCC): ICD-10-CM

## 2023-11-30 DIAGNOSIS — Z00.00 MEDICARE ANNUAL WELLNESS VISIT, SUBSEQUENT: Primary | ICD-10-CM

## 2023-11-30 DIAGNOSIS — E11.65 TYPE 2 DIABETES MELLITUS WITH HYPERGLYCEMIA, WITH LONG-TERM CURRENT USE OF INSULIN (HCC): ICD-10-CM

## 2023-11-30 DIAGNOSIS — D64.9 ANEMIA, UNSPECIFIED TYPE: ICD-10-CM

## 2023-11-30 DIAGNOSIS — E78.2 MIXED HYPERLIPIDEMIA DUE TO TYPE 2 DIABETES MELLITUS (HCC): ICD-10-CM

## 2023-11-30 PROCEDURE — 3046F HEMOGLOBIN A1C LEVEL >9.0%: CPT | Performed by: NURSE PRACTITIONER

## 2023-11-30 PROCEDURE — G0439 PPPS, SUBSEQ VISIT: HCPCS | Performed by: NURSE PRACTITIONER

## 2023-11-30 PROCEDURE — 1123F ACP DISCUSS/DSCN MKR DOCD: CPT | Performed by: NURSE PRACTITIONER

## 2023-11-30 ASSESSMENT — PATIENT HEALTH QUESTIONNAIRE - PHQ9
2. FEELING DOWN, DEPRESSED OR HOPELESS: 0
SUM OF ALL RESPONSES TO PHQ QUESTIONS 1-9: 0
SUM OF ALL RESPONSES TO PHQ9 QUESTIONS 1 & 2: 0
SUM OF ALL RESPONSES TO PHQ QUESTIONS 1-9: 0
1. LITTLE INTEREST OR PLEASURE IN DOING THINGS: 0

## 2023-11-30 ASSESSMENT — LIFESTYLE VARIABLES
HOW OFTEN DO YOU HAVE A DRINK CONTAINING ALCOHOL: NEVER
HOW MANY STANDARD DRINKS CONTAINING ALCOHOL DO YOU HAVE ON A TYPICAL DAY: PATIENT DOES NOT DRINK

## 2023-12-01 LAB
BASOPHILS # BLD: 0 K/UL (ref 0–0.1)
BASOPHILS NFR BLD: 0 % (ref 0–1)
DIFFERENTIAL METHOD BLD: ABNORMAL
EOSINOPHIL # BLD: 0 K/UL (ref 0–0.4)
EOSINOPHIL NFR BLD: 1 % (ref 0–7)
ERYTHROCYTE [DISTWIDTH] IN BLOOD BY AUTOMATED COUNT: 13 % (ref 11.5–14.5)
HCT VFR BLD AUTO: 31.9 % (ref 35–47)
HGB BLD-MCNC: 10.4 G/DL (ref 11.5–16)
IMM GRANULOCYTES # BLD AUTO: 0 K/UL (ref 0–0.04)
IMM GRANULOCYTES NFR BLD AUTO: 0 % (ref 0–0.5)
LYMPHOCYTES # BLD: 2.8 K/UL (ref 0.8–3.5)
LYMPHOCYTES NFR BLD: 50 % (ref 12–49)
MCH RBC QN AUTO: 27.4 PG (ref 26–34)
MCHC RBC AUTO-ENTMCNC: 32.6 G/DL (ref 30–36.5)
MCV RBC AUTO: 84.2 FL (ref 80–99)
MONOCYTES # BLD: 0.4 K/UL (ref 0–1)
MONOCYTES NFR BLD: 6 % (ref 5–13)
NEUTS SEG # BLD: 2.5 K/UL (ref 1.8–8)
NEUTS SEG NFR BLD: 43 % (ref 32–75)
NRBC # BLD: 0 K/UL (ref 0–0.01)
NRBC BLD-RTO: 0 PER 100 WBC
PLATELET # BLD AUTO: 206 K/UL (ref 150–400)
PMV BLD AUTO: 11.2 FL (ref 8.9–12.9)
RBC # BLD AUTO: 3.79 M/UL (ref 3.8–5.2)
T4 FREE SERPL-MCNC: 1.1 NG/DL (ref 0.8–1.5)
TSH SERPL DL<=0.05 MIU/L-ACNC: 2.38 UIU/ML (ref 0.36–3.74)
WBC # BLD AUTO: 5.7 K/UL (ref 3.6–11)

## 2023-12-12 ENCOUNTER — TELEPHONE (OUTPATIENT)
Age: 68
End: 2023-12-12

## 2023-12-12 NOTE — TELEPHONE ENCOUNTER
Received verbal permission to speak with [facility] RN, Paulina Fernandez regarding releasing her most recent A1C. Her most recent A1C was given and repeated back for clarity.

## 2024-02-01 RX ORDER — GLIPIZIDE 10 MG/1
10 TABLET ORAL 2 TIMES DAILY
Qty: 180 TABLET | Refills: 1 | Status: SHIPPED | OUTPATIENT
Start: 2024-02-01

## 2024-02-20 DIAGNOSIS — E78.2 MIXED HYPERLIPIDEMIA: ICD-10-CM

## 2024-02-20 DIAGNOSIS — E03.9 ACQUIRED HYPOTHYROIDISM: ICD-10-CM

## 2024-02-20 DIAGNOSIS — E11.29 TYPE 2 DIABETES MELLITUS WITH OTHER DIABETIC KIDNEY COMPLICATION (HCC): Primary | ICD-10-CM

## 2024-02-26 ENCOUNTER — NURSE ONLY (OUTPATIENT)
Facility: CLINIC | Age: 69
End: 2024-02-26

## 2024-02-26 DIAGNOSIS — E11.69 MIXED HYPERLIPIDEMIA DUE TO TYPE 2 DIABETES MELLITUS (HCC): ICD-10-CM

## 2024-02-26 DIAGNOSIS — E78.2 MIXED HYPERLIPIDEMIA DUE TO TYPE 2 DIABETES MELLITUS (HCC): ICD-10-CM

## 2024-02-26 DIAGNOSIS — E11.69 MIXED HYPERLIPIDEMIA DUE TO TYPE 2 DIABETES MELLITUS (HCC): Primary | ICD-10-CM

## 2024-02-26 DIAGNOSIS — E78.2 MIXED HYPERLIPIDEMIA DUE TO TYPE 2 DIABETES MELLITUS (HCC): Primary | ICD-10-CM

## 2024-02-26 DIAGNOSIS — E03.9 ACQUIRED HYPOTHYROIDISM: ICD-10-CM

## 2024-02-26 LAB
ALBUMIN SERPL-MCNC: 3.5 G/DL (ref 3.5–5)
ALBUMIN/GLOB SERPL: 1 (ref 1.1–2.2)
ALP SERPL-CCNC: 144 U/L (ref 45–117)
ALT SERPL-CCNC: 23 U/L (ref 12–78)
ANION GAP SERPL CALC-SCNC: 5 MMOL/L (ref 5–15)
AST SERPL-CCNC: 18 U/L (ref 15–37)
BILIRUB SERPL-MCNC: 0.6 MG/DL (ref 0.2–1)
BUN SERPL-MCNC: 14 MG/DL (ref 6–20)
BUN/CREAT SERPL: 22 (ref 12–20)
CALCIUM SERPL-MCNC: 9.6 MG/DL (ref 8.5–10.1)
CHLORIDE SERPL-SCNC: 107 MMOL/L (ref 97–108)
CHOLEST SERPL-MCNC: 188 MG/DL
CO2 SERPL-SCNC: 29 MMOL/L (ref 21–32)
CREAT SERPL-MCNC: 0.65 MG/DL (ref 0.55–1.02)
EST. AVERAGE GLUCOSE BLD GHB EST-MCNC: 272 MG/DL
GLOBULIN SER CALC-MCNC: 3.5 G/DL (ref 2–4)
GLUCOSE SERPL-MCNC: 181 MG/DL (ref 65–100)
HBA1C MFR BLD: 11.1 % (ref 4–5.6)
HDLC SERPL-MCNC: 48 MG/DL
HDLC SERPL: 3.9 (ref 0–5)
LDLC SERPL CALC-MCNC: 116 MG/DL (ref 0–100)
POTASSIUM SERPL-SCNC: 3.6 MMOL/L (ref 3.5–5.1)
PROT SERPL-MCNC: 7 G/DL (ref 6.4–8.2)
SODIUM SERPL-SCNC: 141 MMOL/L (ref 136–145)
T4 FREE SERPL-MCNC: 1.1 NG/DL (ref 0.8–1.5)
TRIGL SERPL-MCNC: 120 MG/DL
TSH SERPL DL<=0.05 MIU/L-ACNC: 3.17 UIU/ML (ref 0.36–3.74)
VLDLC SERPL CALC-MCNC: 24 MG/DL

## 2024-02-28 RX ORDER — LEVOTHYROXINE SODIUM 0.05 MG/1
TABLET ORAL
Qty: 100 TABLET | Refills: 1 | Status: SHIPPED | OUTPATIENT
Start: 2024-02-28

## 2024-02-28 RX ORDER — ATORVASTATIN CALCIUM 80 MG/1
TABLET, FILM COATED ORAL
Qty: 100 TABLET | Refills: 1 | Status: SHIPPED | OUTPATIENT
Start: 2024-02-28

## 2024-02-28 NOTE — TELEPHONE ENCOUNTER
PCP: Daniel Ramirez APRN - NP    Last appt: 11/30/2023    Future Appointments   Date Time Provider Department Center   4/2/2024 11:30 AM Ervin Deleon MD [facility] [address] [facility]   5/30/2024 10:00 AM Daniel Ramirez APRN - NP [facility]       Requested Prescriptions     Pending Prescriptions Disp Refills    levothyroxine (SYNTHROID) 50 MCG tablet [Pharmacy Med Name: MYLAN-LEVOTHYROX 50MCG TABLET] 100 tablet 2     Sig: TAKE 1 TABLET BY MOUTH DAILY  BEFORE BREAKFAST FOR A CONDITION WITH LOW THYROID HORMONE LEVELS    atorvastatin (LIPITOR) 80 MG tablet [Pharmacy Med Name: Atorvastatin Calcium 80 MG Oral Tablet] 100 tablet 2     Sig: TAKE 1 TABLET BY MOUTH ONCE  DAILY

## 2024-04-01 DIAGNOSIS — E11.29 TYPE 2 DIABETES MELLITUS WITH OTHER DIABETIC KIDNEY COMPLICATION (HCC): ICD-10-CM

## 2024-04-01 DIAGNOSIS — E78.2 MIXED HYPERLIPIDEMIA: ICD-10-CM

## 2024-04-01 DIAGNOSIS — E03.9 ACQUIRED HYPOTHYROIDISM: ICD-10-CM

## 2024-04-02 ENCOUNTER — OFFICE VISIT (OUTPATIENT)
Age: 69
End: 2024-04-02
Payer: MEDICAID

## 2024-04-02 VITALS
HEART RATE: 93 BPM | WEIGHT: 145.6 LBS | SYSTOLIC BLOOD PRESSURE: 127 MMHG | HEIGHT: 60 IN | DIASTOLIC BLOOD PRESSURE: 65 MMHG | BODY MASS INDEX: 28.58 KG/M2

## 2024-04-02 DIAGNOSIS — E11.29 TYPE 2 DIABETES MELLITUS WITH OTHER DIABETIC KIDNEY COMPLICATION (HCC): Primary | ICD-10-CM

## 2024-04-02 DIAGNOSIS — E03.9 ACQUIRED HYPOTHYROIDISM: ICD-10-CM

## 2024-04-02 DIAGNOSIS — E78.2 MIXED HYPERLIPIDEMIA: ICD-10-CM

## 2024-04-02 PROCEDURE — 95251 CONT GLUC MNTR ANALYSIS I&R: CPT | Performed by: INTERNAL MEDICINE

## 2024-04-02 PROCEDURE — 1123F ACP DISCUSS/DSCN MKR DOCD: CPT | Performed by: INTERNAL MEDICINE

## 2024-04-02 PROCEDURE — 99214 OFFICE O/P EST MOD 30 MIN: CPT | Performed by: INTERNAL MEDICINE

## 2024-04-02 PROCEDURE — 3046F HEMOGLOBIN A1C LEVEL >9.0%: CPT | Performed by: INTERNAL MEDICINE

## 2024-04-02 PROCEDURE — G2211 COMPLEX E/M VISIT ADD ON: HCPCS | Performed by: INTERNAL MEDICINE

## 2024-04-02 RX ORDER — ORAL SEMAGLUTIDE 3 MG/1
3 TABLET ORAL DAILY
Qty: 30 TABLET | Refills: 0 | Status: SHIPPED | OUTPATIENT
Start: 2024-04-02

## 2024-04-02 NOTE — PROGRESS NOTES
Chief Complaint   Patient presents with    Diabetes    Thyroid Problem     Pcp and pharmacy verified     History of Present Illness: Geovanna Perdomo is a 68 y.o. female here for follow up of diabetes.  She was diagnosed with diabetes at the age of 29 \"with my pregnancy, but it went away and came back in 2007\".    Her A1C in February 2024 has increased to 11.1%.    Pt denies any recent illnesses, injuries or hospitalizations.    Pt is NOT taking the Metformin, she reports that she is taking Glipizide 10mg BID and Tresiba 18 units every morning. \"I am not taking the insulin on a regular basis\".    She is using the DexCom CGM to monitor her BG.  I reviewed her last 30 days of CGM readings            - Pt wakes around 9AM  - She has breakfast around 10AM, today she had oatmeal and water.  - She has lunch around 1PM, she does not recall what she had for lunch yesterday  - She has dinner around 5-6PM, last night she had spaghetti and pineapple juice.  - She will have a snack of fruit in the evening.    Exercise consists of \"I sometimes use my exercise bike\".  \"I do exercises with my daughters by zoom for 30 minutes Mon-Thurs.\"    No history of vascular disease. No known family hx of CVA or CAD.    She has occasional numbness in her feet and  she does have microalbuminuria.      Last eye exam was 2023 Pt has hx of cataracts s/p removal. She reported that she does not have hx of retinopathy.    She will take her Atorvastatin 80mg \"about 4 times per week.\"    She take her LT4 50mcg every morning with her other AM meds.    Pt reports that she has taken DM education classes in the past.    Current Outpatient Medications   Medication Sig    Insulin Pen Needle 32G X 4 MM MISC One shot daily. E11.65    Semaglutide (RYBELSUS) 3 MG TABS Take 3 mg by mouth daily    levothyroxine (SYNTHROID) 50 MCG tablet TAKE 1 TABLET BY MOUTH DAILY  BEFORE BREAKFAST FOR A CONDITION WITH LOW THYROID HORMONE LEVELS    atorvastatin (LIPITOR) 80 MG tablet

## 2024-04-02 NOTE — PATIENT INSTRUCTIONS
1) Tresiba insulin: Take 22 units every day.    2) Continue the Glipizide 10mg in the morning and 10mg with dinner.    3) I am going to start you on a pill called Rybelsis. I am going to start at 3mg. You will take one pill every day for the next 30 days. After 30 days I will increase the dose to 7mg every day.

## 2024-04-24 RX ORDER — GLIPIZIDE 10 MG/1
10 TABLET ORAL 2 TIMES DAILY
Qty: 200 TABLET | Refills: 2 | Status: SHIPPED | OUTPATIENT
Start: 2024-04-24

## 2024-05-30 ENCOUNTER — OFFICE VISIT (OUTPATIENT)
Facility: CLINIC | Age: 69
End: 2024-05-30
Payer: MEDICAID

## 2024-05-30 VITALS
TEMPERATURE: 97.6 F | WEIGHT: 150.8 LBS | SYSTOLIC BLOOD PRESSURE: 126 MMHG | DIASTOLIC BLOOD PRESSURE: 74 MMHG | HEIGHT: 60 IN | BODY MASS INDEX: 29.61 KG/M2 | RESPIRATION RATE: 16 BRPM | HEART RATE: 76 BPM | OXYGEN SATURATION: 100 %

## 2024-05-30 DIAGNOSIS — E55.9 VITAMIN D DEFICIENCY, UNSPECIFIED: ICD-10-CM

## 2024-05-30 DIAGNOSIS — E11.65 TYPE 2 DIABETES MELLITUS WITH HYPERGLYCEMIA, WITH LONG-TERM CURRENT USE OF INSULIN (HCC): ICD-10-CM

## 2024-05-30 DIAGNOSIS — E11.69 MIXED HYPERLIPIDEMIA DUE TO TYPE 2 DIABETES MELLITUS (HCC): ICD-10-CM

## 2024-05-30 DIAGNOSIS — Z00.00 MEDICARE ANNUAL WELLNESS VISIT, SUBSEQUENT: Primary | ICD-10-CM

## 2024-05-30 DIAGNOSIS — E78.2 MIXED HYPERLIPIDEMIA DUE TO TYPE 2 DIABETES MELLITUS (HCC): ICD-10-CM

## 2024-05-30 DIAGNOSIS — Z79.4 TYPE 2 DIABETES MELLITUS WITH HYPERGLYCEMIA, WITH LONG-TERM CURRENT USE OF INSULIN (HCC): ICD-10-CM

## 2024-05-30 DIAGNOSIS — Z71.89 ACP (ADVANCE CARE PLANNING): ICD-10-CM

## 2024-05-30 DIAGNOSIS — E03.9 ACQUIRED HYPOTHYROIDISM: ICD-10-CM

## 2024-05-30 PROCEDURE — 3046F HEMOGLOBIN A1C LEVEL >9.0%: CPT | Performed by: NURSE PRACTITIONER

## 2024-05-30 PROCEDURE — 1123F ACP DISCUSS/DSCN MKR DOCD: CPT | Performed by: NURSE PRACTITIONER

## 2024-05-30 PROCEDURE — G0439 PPPS, SUBSEQ VISIT: HCPCS | Performed by: NURSE PRACTITIONER

## 2024-05-30 SDOH — ECONOMIC STABILITY: FOOD INSECURITY: WITHIN THE PAST 12 MONTHS, YOU WORRIED THAT YOUR FOOD WOULD RUN OUT BEFORE YOU GOT MONEY TO BUY MORE.: NEVER TRUE

## 2024-05-30 SDOH — ECONOMIC STABILITY: FOOD INSECURITY: WITHIN THE PAST 12 MONTHS, THE FOOD YOU BOUGHT JUST DIDN'T LAST AND YOU DIDN'T HAVE MONEY TO GET MORE.: NEVER TRUE

## 2024-05-30 SDOH — ECONOMIC STABILITY: INCOME INSECURITY: HOW HARD IS IT FOR YOU TO PAY FOR THE VERY BASICS LIKE FOOD, HOUSING, MEDICAL CARE, AND HEATING?: NOT HARD AT ALL

## 2024-05-30 ASSESSMENT — PATIENT HEALTH QUESTIONNAIRE - PHQ9
SUM OF ALL RESPONSES TO PHQ QUESTIONS 1-9: 0
SUM OF ALL RESPONSES TO PHQ QUESTIONS 1-9: 0
SUM OF ALL RESPONSES TO PHQ9 QUESTIONS 1 & 2: 0
SUM OF ALL RESPONSES TO PHQ QUESTIONS 1-9: 0
SUM OF ALL RESPONSES TO PHQ QUESTIONS 1-9: 0
2. FEELING DOWN, DEPRESSED OR HOPELESS: NOT AT ALL
1. LITTLE INTEREST OR PLEASURE IN DOING THINGS: NOT AT ALL

## 2024-05-30 NOTE — PROGRESS NOTES
Geovanna Perdomo is a 68 y.o. female     Chief Complaint   Patient presents with    Medicare AWV       /74 (Site: Left Upper Arm, Position: Sitting, Cuff Size: Medium Adult)   Pulse 76   Temp 97.6 °F (36.4 °C) (Oral)   Resp 16   Ht 1.524 m (5')   Wt 68.4 kg (150 lb 12.8 oz)   SpO2 100%   BMI 29.45 kg/m²     Health Maintenance Due   Topic Date Due    COVID-19 Vaccine (1) Never done    Pneumococcal 65+ years Vaccine (1 of 2 - PCV) Never done    DTaP/Tdap/Td vaccine (1 - Tdap) Never done    Shingles vaccine (1 of 2) Never done    Respiratory Syncytial Virus (RSV) Pregnant or age 60 yrs+ (1 - 1-dose 60+ series) Never done    Annual Wellness Visit (Medicare Advantage)  01/01/2024         \"Have you been to the ER, urgent care clinic since your last visit?  Hospitalized since your last visit?\"    NO    “Have you seen or consulted any other health care providers outside of [facility] since your last visit?”    NO

## 2024-05-30 NOTE — PROGRESS NOTES
Medicare Annual Wellness Visit    Geovanna Perdomo is here for Medicare AWV    Assessment & Plan   Medicare annual wellness visit, subsequent  Mixed hyperlipidemia due to type 2 diabetes mellitus ([facility])  -     [facility] ([address])  Type 2 diabetes mellitus with hyperglycemia, with long-term current use of insulin ([facility])  -     [facility] [place] ([address])  Acquired hypothyroidism  Vitamin D deficiency, unspecified  ACP (advance care planning)    Recommendations for Preventive Services Due: see orders and patient instructions/AVS.  Recommended screening schedule for the next 5-10 years is provided to the patient in written form: see Patient Instructions/AVS.     Return in about 6 months (around 11/30/2024) for Follow up, Fasting labs.     Subjective       Patient's complete Health Risk Assessment and screening values have been reviewed and are found in Flowsheets. The following problems were reviewed today and where indicated follow up appointments were made and/or referrals ordered.    Positive Risk Factor Screenings with Interventions:                      Advanced Directives:  Do you have a Living Will?: (!) No    Intervention:  has NO advanced directive - not interested in additional information                     Objective   Vitals:    05/30/24 1003   BP: 126/74   Site: Left Upper Arm   Position: Sitting   Cuff Size: Medium Adult   Pulse: 76   Resp: 16   Temp: 97.6 °F (36.4 °C)   TempSrc: Oral   SpO2: 100%   Weight: 68.4 kg (150 lb 12.8 oz)   Height: 1.524 m (5')      Body mass index is 29.45 kg/m².        General Appearance: alert and oriented to person, place and time, well-developed and well-nourished, in no acute distress  Head: normocephalic and atraumatic  Eyes: pupils equal, round, and reactive to light, extraocular eye movements intact, conjunctivae normal  Pulmonary/Chest: clear to auscultation bilaterally- no wheezes, rales or

## 2024-06-21 ENCOUNTER — OFFICE VISIT (OUTPATIENT)
Age: 69
End: 2024-06-21
Payer: MEDICAID

## 2024-06-21 DIAGNOSIS — Z79.4 TYPE 2 DIABETES MELLITUS WITH HYPERGLYCEMIA, WITH LONG-TERM CURRENT USE OF INSULIN (HCC): Primary | ICD-10-CM

## 2024-06-21 DIAGNOSIS — E11.65 TYPE 2 DIABETES MELLITUS WITH HYPERGLYCEMIA, WITH LONG-TERM CURRENT USE OF INSULIN (HCC): Primary | ICD-10-CM

## 2024-06-21 PROCEDURE — G0108 DIAB MANAGE TRN  PER INDIV: HCPCS

## 2024-06-25 NOTE — PROGRESS NOTES
[facility]  Diabetes Self-Management Education & Support Program  Encounter Note      SUMMARY  Diabetes self-care management training was completed related to healthy eating and physical activity. The participant will return on July 26 to continue DSMES related to reducing risks and healthy eating. The participant did not identify SMART Goal(s) and will practice knowledge and skills related to healthy eating and being active to improve diabetes self-management.        EVALUATION:  Ms Perdomo completed the first of 2 hours annual diabetes education covered by Medicare.  She expressed understanding of T2 DM disease process & the ADA recommended targets for BG & A1c. Her A1c in Feb was 11.1%. She is using CGM and FBG ranging 180 mg/dL - 250 mg/dL. She requested to talk about healthy eating and exercise today.    Geovanna eats her first meal 11am-noon. It may be 2 eggs, bowl of grits, 2 slices gonzalez & green tea with sugar. Her next meal is at 6- 6:30 pm and may be fried or baked chicken, with greens and a sweet potato. She snacks on nuts, apples, pears and often eats ice cream after dinner when watching TV.   We reviewed healthy plate, CHO counting and reading nutrition facts labels to build balanced, CHO controlled meals. We discussed low CHO snacks and alternatives to ice cream. She is making her green tea and uses a cup of sugar per pitcher. We discussed trying stevia or monk fruit to gilma tea instead of sugar. Geovanna was provided the ADA handout best snacks for people with diabetes. We will continue meal planning next session.    Geovanna exercises for 30 minutes 4 times a week. She expressed understanding of the benefits of exercise including lowering BG, LDL cholesterol and reducing stress. We discussed the timing of exercise in relationship to meals to lower BG most effectively. She plans to start exercising or walking after dinner.    RECOMMENDATIONS:    Eat 3 balanced, CHO controlled

## 2024-07-26 ENCOUNTER — OFFICE VISIT (OUTPATIENT)
Age: 69
End: 2024-07-26
Payer: MEDICAID

## 2024-07-26 DIAGNOSIS — E11.65 TYPE 2 DIABETES MELLITUS WITH HYPERGLYCEMIA, WITH LONG-TERM CURRENT USE OF INSULIN (HCC): Primary | ICD-10-CM

## 2024-07-26 DIAGNOSIS — Z79.4 TYPE 2 DIABETES MELLITUS WITH HYPERGLYCEMIA, WITH LONG-TERM CURRENT USE OF INSULIN (HCC): Primary | ICD-10-CM

## 2024-07-26 PROCEDURE — G0108 DIAB MANAGE TRN  PER INDIV: HCPCS

## 2024-07-29 NOTE — PROGRESS NOTES
[facility]  Diabetes Self-Management Education & Support Program  Encounter Note      SUMMARY  Diabetes self-care management training was completed related to healthy eating. The participant did not identify SMART Goal(s) and will practice knowledge and skills related to healthy eating and being active to improve diabetes self-management.        EVALUATION:  Ms Perdomo completed second hour of annual diabetes education. She shared made some changes to eating pattern since last session and BG has improved. Her FBG ranging 130-150 mg/dL. She has started exercising for 30 minutes after dinner.    RECOMMENDATIONS:  Use healthy plate, count CHOs & read nutrition facts labels to build balanced, CHO controlled meals.  Increase physical activity to include 150 minutes aerobic exercise a week.     TOPICS DISCUSSED TODAY:  WHAT CAN I EAT? 60      Next provider visit is scheduled for 8/15/24       DATE DSMES TOPIC EVALUATION     7/29/2024 WHAT CAN I EAT?   General principles   Determining a healthy weight   Nutritional terms & tools   Healthy Plate method   Carbohydrate Counting   Reading food labels   Free apps   Pregnancy recommendations      The participant   Uses Healthy Plate principles in constructing meals: Yes  Reads food labels in choosing acceptable foods: Yes         DATE DSMES TOPIC EVALUATION     7/29/2024 HOW CAN BLOOD GLUCOSE MONITORING HELP ME?   Value of blood glucose monitoring   Realistic expectations   Blood glucose monitoring targets   Target adjustments   Setting a1c & blood glucose targets with provider   Meter selection    Technique for obtaining blood droplet   Blood glucose testing sites   Determining best times to test   Pregnancy recommendations   Data sharing with provider        The participant   Can demonstrate their glucometer procedure: Yes  Logs their BG readings:  Yes         DATE DSMES TOPIC EVALUATION     7/29/2024 HOW DO MY DIABETES MEDICATIONS WORK?   Type 1

## 2024-08-13 ENCOUNTER — LAB (OUTPATIENT)
Facility: CLINIC | Age: 69
End: 2024-08-13

## 2024-08-13 DIAGNOSIS — E78.2 MIXED HYPERLIPIDEMIA DUE TO TYPE 2 DIABETES MELLITUS (HCC): Primary | ICD-10-CM

## 2024-08-13 DIAGNOSIS — E78.2 MIXED HYPERLIPIDEMIA DUE TO TYPE 2 DIABETES MELLITUS (HCC): ICD-10-CM

## 2024-08-13 DIAGNOSIS — E11.69 MIXED HYPERLIPIDEMIA DUE TO TYPE 2 DIABETES MELLITUS (HCC): Primary | ICD-10-CM

## 2024-08-13 DIAGNOSIS — E03.9 ACQUIRED HYPOTHYROIDISM: ICD-10-CM

## 2024-08-13 DIAGNOSIS — E11.69 MIXED HYPERLIPIDEMIA DUE TO TYPE 2 DIABETES MELLITUS (HCC): ICD-10-CM

## 2024-08-13 LAB
ALBUMIN SERPL-MCNC: 3.5 G/DL (ref 3.5–5)
ALBUMIN/GLOB SERPL: 1.1 (ref 1.1–2.2)
ALP SERPL-CCNC: 175 U/L (ref 45–117)
ALT SERPL-CCNC: 35 U/L (ref 12–78)
ANION GAP SERPL CALC-SCNC: 4 MMOL/L (ref 5–15)
AST SERPL-CCNC: 29 U/L (ref 15–37)
BILIRUB SERPL-MCNC: 0.7 MG/DL (ref 0.2–1)
BUN SERPL-MCNC: 13 MG/DL (ref 6–20)
BUN/CREAT SERPL: 18 (ref 12–20)
CALCIUM SERPL-MCNC: 9.3 MG/DL (ref 8.5–10.1)
CHLORIDE SERPL-SCNC: 108 MMOL/L (ref 97–108)
CHOLEST SERPL-MCNC: 137 MG/DL
CO2 SERPL-SCNC: 30 MMOL/L (ref 21–32)
COMMENT:: NORMAL
CREAT SERPL-MCNC: 0.73 MG/DL (ref 0.55–1.02)
CREAT UR-MCNC: 81 MG/DL
EST. AVERAGE GLUCOSE BLD GHB EST-MCNC: 232 MG/DL
GLOBULIN SER CALC-MCNC: 3.2 G/DL (ref 2–4)
GLUCOSE SERPL-MCNC: 253 MG/DL (ref 65–100)
HBA1C MFR BLD: 9.7 % (ref 4–5.6)
HDLC SERPL-MCNC: 40 MG/DL
HDLC SERPL: 3.4 (ref 0–5)
LDLC SERPL CALC-MCNC: 72.4 MG/DL (ref 0–100)
MICROALBUMIN UR-MCNC: 23.8 MG/DL
MICROALBUMIN/CREAT UR-RTO: 294 MG/G (ref 0–30)
POTASSIUM SERPL-SCNC: 4 MMOL/L (ref 3.5–5.1)
PROT SERPL-MCNC: 6.7 G/DL (ref 6.4–8.2)
SODIUM SERPL-SCNC: 142 MMOL/L (ref 136–145)
SPECIMEN HOLD: NORMAL
T4 FREE SERPL-MCNC: 1.1 NG/DL (ref 0.8–1.5)
TRIGL SERPL-MCNC: 123 MG/DL
TSH SERPL DL<=0.05 MIU/L-ACNC: 1.84 UIU/ML (ref 0.36–3.74)
VLDLC SERPL CALC-MCNC: 24.6 MG/DL

## 2024-08-15 ENCOUNTER — OFFICE VISIT (OUTPATIENT)
Age: 69
End: 2024-08-15

## 2024-08-15 VITALS
WEIGHT: 148 LBS | SYSTOLIC BLOOD PRESSURE: 130 MMHG | BODY MASS INDEX: 29.06 KG/M2 | HEIGHT: 60 IN | HEART RATE: 86 BPM | DIASTOLIC BLOOD PRESSURE: 61 MMHG

## 2024-08-15 DIAGNOSIS — E03.9 ACQUIRED HYPOTHYROIDISM: ICD-10-CM

## 2024-08-15 DIAGNOSIS — E78.2 MIXED HYPERLIPIDEMIA: ICD-10-CM

## 2024-08-15 DIAGNOSIS — E11.29 TYPE 2 DIABETES MELLITUS WITH OTHER DIABETIC KIDNEY COMPLICATION (HCC): Primary | ICD-10-CM

## 2024-08-15 NOTE — PROGRESS NOTES
Chief Complaint   Patient presents with    Diabetes     Pcp and pharmacy verified    Thyroid Problem     History of Present Illness: Geovanna Perdomo is a 69 y.o. female here for follow up of diabetes.  She was diagnosed with diabetes at the age of 29 \"with my pregnancy, but it went away and came back in 2007\".    At our last visit in April 2024 her A1C was 11.1%.  She stopped taking her Metformin and was not taking her Tresiba on a regular basis.  I increased her Tesiba to 22 units daily, continued the Glipizice 10mg BID and I started her on Rybelsis 3mg daily.  I emphasized the importance of her taking her Tresiba units daily.    \"I started seeing the nutritionist.\"    Pt denies any recent illnesses, injuries or hospitalizations.    Pt is taking Tresiba 22 units every morning, Glipizide 10mg after breakfast and after dinner. She did not start the Rybelsis.    She is using the DexCom CGM to monitor her BG.  I reviewed her last 30 days of CGM readings          Her A1C last week was 9.7%.    - Pt wakes around 9AM  - She has breakfast around 1030-11AM, yesterday she had 2 eggs, sausage, toast and water.  - She has lunch around 130-2PM, yesterday she had PB crackers and water.   - She has dinner around 530-6PM, last night she had ground beef, cabbage and water.   - She will have a snack of fruit in the evening.    Exercise consists of \"I do exercises with my daughters by zoom for 30 minutes Mon-Thurs.\"    No history of vascular disease. No known family hx of CVA or CAD.    She has occasional numbness in her feet and  she does have microalbuminuria.      Last eye exam was 2023 Pt has hx of cataracts s/p removal. She reported that she does not have hx of retinopathy.    She will take her Atorvastatin 80mg \"when I take my other medications.\"     She take her LT4 50mcg every morning with her other AM meds.    Pt reports that she has taken DM education classes in the past.    Current Outpatient Medications   Medication Sig

## 2024-08-15 NOTE — PATIENT INSTRUCTIONS
1) Tresiba (Insulin): Take 28 units every morning.    2) Glipizide: Take one pill BEFORE breakfast every morning and one pill BEFORE dinner every evening.

## 2024-08-19 RX ORDER — LEVOTHYROXINE SODIUM 0.05 MG/1
TABLET ORAL
Qty: 100 TABLET | Refills: 1 | Status: SHIPPED | OUTPATIENT
Start: 2024-08-19

## 2024-08-19 RX ORDER — ATORVASTATIN CALCIUM 80 MG/1
TABLET, FILM COATED ORAL
Qty: 100 TABLET | Refills: 1 | Status: SHIPPED | OUTPATIENT
Start: 2024-08-19

## 2024-08-19 NOTE — TELEPHONE ENCOUNTER
PCP: Daniel Ramirez APRN - NP    Last appt: 5/30/2024    Future Appointments   Date Time Provider Department Center   12/4/2024 10:00 AM Daniel Ramirez APRN - NP [facility]   12/19/2024  2:10 PM Ervin Deleon MD R[facility] AMB       Requested Prescriptions     Pending Prescriptions Disp Refills    atorvastatin (LIPITOR) 80 MG tablet [Pharmacy Med Name: Atorvastatin Calcium 80 MG Oral Tablet] 100 tablet 1     Sig: TAKE 1 TABLET BY MOUTH ONCE  DAILY    levothyroxine (SYNTHROID) 50 MCG tablet [Pharmacy Med Name: MYLAN-LEVOTHYROXINE TAB 50MCG] 100 tablet 1     Sig: TAKE 1 TABLET BY MOUTH DAILY  BEFORE BREAKFAST FOR A CONDITION WITH LOW THYROID HORMONE LEVELS

## 2024-08-22 RX ORDER — GLIPIZIDE 10 MG/1
10 TABLET ORAL 2 TIMES DAILY
Qty: 200 TABLET | Refills: 1 | Status: SHIPPED | OUTPATIENT
Start: 2024-08-22

## 2024-08-22 NOTE — TELEPHONE ENCOUNTER
Per [facility], patient requested refill of Glipizide to her mail order pharmacy.  Per last OV:  take the Glipizide 10mg BEFORE breakfast and BEFORE dinner.   
Spontaneous, unlabored and symmetrical

## 2024-12-01 DIAGNOSIS — E03.9 ACQUIRED HYPOTHYROIDISM: ICD-10-CM

## 2024-12-01 DIAGNOSIS — E11.29 TYPE 2 DIABETES MELLITUS WITH OTHER DIABETIC KIDNEY COMPLICATION (HCC): ICD-10-CM

## 2024-12-01 DIAGNOSIS — E78.2 MIXED HYPERLIPIDEMIA: ICD-10-CM

## 2024-12-04 ENCOUNTER — OFFICE VISIT (OUTPATIENT)
Facility: CLINIC | Age: 69
End: 2024-12-04
Payer: MEDICARE

## 2024-12-04 VITALS
HEART RATE: 90 BPM | DIASTOLIC BLOOD PRESSURE: 70 MMHG | OXYGEN SATURATION: 99 % | BODY MASS INDEX: 28.94 KG/M2 | WEIGHT: 147.4 LBS | TEMPERATURE: 97.5 F | HEIGHT: 60 IN | RESPIRATION RATE: 16 BRPM | SYSTOLIC BLOOD PRESSURE: 122 MMHG

## 2024-12-04 DIAGNOSIS — E78.2 MIXED HYPERLIPIDEMIA DUE TO TYPE 2 DIABETES MELLITUS (HCC): Primary | ICD-10-CM

## 2024-12-04 DIAGNOSIS — E11.69 MIXED HYPERLIPIDEMIA DUE TO TYPE 2 DIABETES MELLITUS (HCC): Primary | ICD-10-CM

## 2024-12-04 DIAGNOSIS — E11.65 TYPE 2 DIABETES MELLITUS WITH HYPERGLYCEMIA, WITH LONG-TERM CURRENT USE OF INSULIN (HCC): ICD-10-CM

## 2024-12-04 DIAGNOSIS — Z79.4 TYPE 2 DIABETES MELLITUS WITH HYPERGLYCEMIA, WITH LONG-TERM CURRENT USE OF INSULIN (HCC): ICD-10-CM

## 2024-12-04 DIAGNOSIS — E03.9 ACQUIRED HYPOTHYROIDISM: ICD-10-CM

## 2024-12-04 PROCEDURE — 1123F ACP DISCUSS/DSCN MKR DOCD: CPT | Performed by: NURSE PRACTITIONER

## 2024-12-04 PROCEDURE — G8399 PT W/DXA RESULTS DOCUMENT: HCPCS | Performed by: NURSE PRACTITIONER

## 2024-12-04 PROCEDURE — G8427 DOCREV CUR MEDS BY ELIG CLIN: HCPCS | Performed by: NURSE PRACTITIONER

## 2024-12-04 PROCEDURE — G8419 CALC BMI OUT NRM PARAM NOF/U: HCPCS | Performed by: NURSE PRACTITIONER

## 2024-12-04 PROCEDURE — 1090F PRES/ABSN URINE INCON ASSESS: CPT | Performed by: NURSE PRACTITIONER

## 2024-12-04 PROCEDURE — 1159F MED LIST DOCD IN RCRD: CPT | Performed by: NURSE PRACTITIONER

## 2024-12-04 PROCEDURE — 3017F COLORECTAL CA SCREEN DOC REV: CPT | Performed by: NURSE PRACTITIONER

## 2024-12-04 PROCEDURE — G8484 FLU IMMUNIZE NO ADMIN: HCPCS | Performed by: NURSE PRACTITIONER

## 2024-12-04 PROCEDURE — 1160F RVW MEDS BY RX/DR IN RCRD: CPT | Performed by: NURSE PRACTITIONER

## 2024-12-04 PROCEDURE — 99214 OFFICE O/P EST MOD 30 MIN: CPT | Performed by: NURSE PRACTITIONER

## 2024-12-04 PROCEDURE — 1036F TOBACCO NON-USER: CPT | Performed by: NURSE PRACTITIONER

## 2024-12-04 PROCEDURE — 1126F AMNT PAIN NOTED NONE PRSNT: CPT | Performed by: NURSE PRACTITIONER

## 2024-12-04 PROCEDURE — 2022F DILAT RTA XM EVC RTNOPTHY: CPT | Performed by: NURSE PRACTITIONER

## 2024-12-04 PROCEDURE — 3046F HEMOGLOBIN A1C LEVEL >9.0%: CPT | Performed by: NURSE PRACTITIONER

## 2024-12-04 RX ORDER — ACYCLOVIR 400 MG/1
1 TABLET ORAL
COMMUNITY

## 2024-12-04 NOTE — PROGRESS NOTES
Geovanna Perdomo is a 69 y.o. female     Chief Complaint   Patient presents with    Diabetes     5M       /70 (Site: Left Upper Arm, Position: Sitting, Cuff Size: Medium Adult)   Pulse 90   Temp 97.5 °F (36.4 °C) (Oral)   Resp 16   Ht 1.524 m (5')   Wt 66.9 kg (147 lb 6.4 oz)   SpO2 99%   BMI 28.79 kg/m²     Health Maintenance Due   Topic Date Due    Pneumococcal 65+ years Vaccine (1 of 2 - PCV) Never done    DTaP/Tdap/Td vaccine (1 - Tdap) Never done    Shingles vaccine (1 of 2) Never done    Respiratory Syncytial Virus (RSV) Pregnant or age 60 yrs+ (1 - Risk 60-74 years 1-dose series) Never done    Flu vaccine (1) Never done    COVID-19 Vaccine (1 - 2023-24 season) Never done         \"Have you been to the ER, urgent care clinic since your last visit?  Hospitalized since your last visit?\"    NO    “Have you seen or consulted any other health care providers outside of [facility] since your last visit?”    NO                     
28.79 kg/m²     Constitutional: She appears well nourished, of stated age, and dressed appropriately.  Eyes: Sclera anicteric, PERRLA, EOMI  Neck: Supple without lymphadenopathy. Thyroid normal, No JVD or bruits  Respiratory: Clear to ascultation X5, normal inspiratory effort, no adventitious breath sounds.  Cardiovascular: Regular rate and rhythm, no rubs or gallops, PMI not displaced, No thrills  Neuro: Non-focal exam, A & O X 3.   Psychiatric: Appropriate affect and demeanor, pleasant and cooperative. Patient's thought content and thought processing appear to be within normal limits.     ASSESSMENT/PLAN:    Diagnosis Orders   1. Mixed hyperlipidemia due to type 2 diabetes mellitus (HCC)  Comprehensive Metabolic Panel    Lipid Panel    Hemoglobin A1C    Hemoglobin A1C    Lipid Panel    Comprehensive Metabolic Panel      2. Type 2 diabetes mellitus with hyperglycemia, with long-term current use of insulin ([facility])        3. Acquired hypothyroidism  TSH    T4, Free    T4, Free    TSH        1: Labs ordered today include: CMP, lipid panel, hemoglobin A1c, TSH, and free T4.  2: Patient to maintain current use of current medications for management diabetes as prescribed.  Continue use of CGM and focus on more strict avoidance of concentrated sweets and excess carbohydrates.  Follow-up with endocrinology as planned.  3: Continue atorvastatin daily for management of mixed hyperlipidemia.  Continue to focus on low-fat/low-cholesterol dietary intake.  4: Continue levothyroxine daily for management of hypothyroidism.  Patient tolerating well.  TSH stable.  5: Continue all other medications and supplements as directed.  6: Patient to follow-up with me in approximately 6 months for annual wellness visit and fasting labs, or sooner if necessary.  Patient agrees and states understanding.        ATTENTION:   This medical record was transcribed using an electronic medical records system.  Although proofread, it may and can contain

## 2024-12-05 LAB
ALBUMIN SERPL-MCNC: 3.5 G/DL (ref 3.5–5)
ALBUMIN/GLOB SERPL: 1.1 (ref 1.1–2.2)
ALP SERPL-CCNC: 152 U/L (ref 45–117)
ALT SERPL-CCNC: 20 U/L (ref 12–78)
ANION GAP SERPL CALC-SCNC: 3 MMOL/L (ref 2–12)
AST SERPL-CCNC: 14 U/L (ref 15–37)
BILIRUB SERPL-MCNC: 0.5 MG/DL (ref 0.2–1)
BUN SERPL-MCNC: 14 MG/DL (ref 6–20)
BUN/CREAT SERPL: 22 (ref 12–20)
CALCIUM SERPL-MCNC: 9.3 MG/DL (ref 8.5–10.1)
CHLORIDE SERPL-SCNC: 107 MMOL/L (ref 97–108)
CHOLEST SERPL-MCNC: 194 MG/DL
CO2 SERPL-SCNC: 29 MMOL/L (ref 21–32)
CREAT SERPL-MCNC: 0.65 MG/DL (ref 0.55–1.02)
EST. AVERAGE GLUCOSE BLD GHB EST-MCNC: 344 MG/DL
GLOBULIN SER CALC-MCNC: 3.1 G/DL (ref 2–4)
GLUCOSE SERPL-MCNC: 256 MG/DL (ref 65–100)
HBA1C MFR BLD: 13.6 % (ref 4–5.6)
HDLC SERPL-MCNC: 48 MG/DL
HDLC SERPL: 4 (ref 0–5)
LDLC SERPL CALC-MCNC: 125.2 MG/DL (ref 0–100)
POTASSIUM SERPL-SCNC: 4.8 MMOL/L (ref 3.5–5.1)
PROT SERPL-MCNC: 6.6 G/DL (ref 6.4–8.2)
SODIUM SERPL-SCNC: 139 MMOL/L (ref 136–145)
T4 FREE SERPL-MCNC: 1.2 NG/DL (ref 0.8–1.5)
TRIGL SERPL-MCNC: 104 MG/DL
TSH SERPL DL<=0.05 MIU/L-ACNC: 3.86 UIU/ML (ref 0.36–3.74)
VLDLC SERPL CALC-MCNC: 20.8 MG/DL

## 2024-12-06 RX ORDER — LEVOTHYROXINE SODIUM 75 UG/1
75 TABLET ORAL DAILY
Qty: 90 TABLET | Refills: 1 | Status: SHIPPED | OUTPATIENT
Start: 2024-12-06

## 2024-12-09 DIAGNOSIS — E03.9 ACQUIRED HYPOTHYROIDISM: Primary | ICD-10-CM

## 2025-01-20 ENCOUNTER — LAB (OUTPATIENT)
Facility: CLINIC | Age: 70
End: 2025-01-20

## 2025-01-20 DIAGNOSIS — E03.9 ACQUIRED HYPOTHYROIDISM: ICD-10-CM

## 2025-01-21 LAB
T4 FREE SERPL-MCNC: 1.3 NG/DL (ref 0.8–1.5)
TSH SERPL DL<=0.05 MIU/L-ACNC: 3.11 UIU/ML (ref 0.36–3.74)

## 2025-01-23 DIAGNOSIS — E11.69 MIXED HYPERLIPIDEMIA DUE TO TYPE 2 DIABETES MELLITUS (HCC): Primary | ICD-10-CM

## 2025-01-23 DIAGNOSIS — E78.2 MIXED HYPERLIPIDEMIA DUE TO TYPE 2 DIABETES MELLITUS (HCC): Primary | ICD-10-CM

## 2025-01-24 ENCOUNTER — OFFICE VISIT (OUTPATIENT)
Age: 70
End: 2025-01-24

## 2025-01-24 VITALS
HEART RATE: 110 BPM | BODY MASS INDEX: 29.53 KG/M2 | DIASTOLIC BLOOD PRESSURE: 82 MMHG | HEIGHT: 60 IN | SYSTOLIC BLOOD PRESSURE: 140 MMHG | WEIGHT: 150.4 LBS

## 2025-01-24 DIAGNOSIS — E11.29 TYPE 2 DIABETES MELLITUS WITH OTHER DIABETIC KIDNEY COMPLICATION (HCC): Primary | ICD-10-CM

## 2025-01-24 DIAGNOSIS — E78.2 MIXED HYPERLIPIDEMIA: ICD-10-CM

## 2025-01-24 DIAGNOSIS — E03.9 ACQUIRED HYPOTHYROIDISM: ICD-10-CM

## 2025-01-24 RX ORDER — ATORVASTATIN CALCIUM 80 MG/1
TABLET, FILM COATED ORAL
Qty: 100 TABLET | Refills: 1 | Status: SHIPPED | OUTPATIENT
Start: 2025-01-24

## 2025-01-24 NOTE — TELEPHONE ENCOUNTER
RX refill request from the patient/pharmacy. Patient last seen 01- with labs, and next appt. scheduled for 06-  Requested Prescriptions     Pending Prescriptions Disp Refills    atorvastatin (LIPITOR) 80 MG tablet [Pharmacy Med Name: Atorvastatin Calcium 80 MG Oral Tablet] 100 tablet 3     Sig: TAKE 1 TABLET BY MOUTH ONCE  DAILY    .

## 2025-01-24 NOTE — PATIENT INSTRUCTIONS
1) Tresiba: Take 40 units every day.    2) I will call in a medication called Ozempic.  You will take 0.25mg every week for 4 weeks. After 4 weeks increase to 0.5mg every week.    Please call, or send me a "[employer] message in 6 weeks to let me know how your sugars are doing.

## 2025-02-12 DIAGNOSIS — E03.9 ACQUIRED HYPOTHYROIDISM: ICD-10-CM

## 2025-02-13 RX ORDER — LEVOTHYROXINE SODIUM 75 UG/1
75 TABLET ORAL DAILY
Qty: 100 TABLET | Refills: 1 | Status: SHIPPED | OUTPATIENT
Start: 2025-02-13

## 2025-02-13 NOTE — TELEPHONE ENCOUNTER
PCP: Daniel Ramirez APRN - NP    Last appt: 12/4/2024    Future Appointments   Date Time Provider Department Center   5/30/2025 11:10 AM Ervin Deleon MD [facility]   6/6/2025 10:00 AM Daniel Ramirez APRN - NP [facility]       Requested Prescriptions     Pending Prescriptions Disp Refills    levothyroxine (SYNTHROID) 75 MCG tablet [Pharmacy Med Name: ALVOGEN-LEVOTHYROXINE TAB 75MCG] 100 tablet 2     Sig: TAKE 1 TABLET BY MOUTH DAILY

## 2025-02-19 RX ORDER — GLIPIZIDE 10 MG/1
TABLET ORAL
Qty: 200 TABLET | Refills: 1 | Status: SHIPPED | OUTPATIENT
Start: 2025-02-19

## 2025-03-04 RX ORDER — SEMAGLUTIDE 0.68 MG/ML
INJECTION, SOLUTION SUBCUTANEOUS
Qty: 6 ML | Refills: 1 | Status: SHIPPED | OUTPATIENT
Start: 2025-03-04

## 2025-03-31 ENCOUNTER — PATIENT MESSAGE (OUTPATIENT)
Facility: CLINIC | Age: 70
End: 2025-03-31

## 2025-04-02 ENCOUNTER — OFFICE VISIT (OUTPATIENT)
Facility: CLINIC | Age: 70
End: 2025-04-02
Payer: MEDICARE

## 2025-04-02 VITALS
HEART RATE: 100 BPM | WEIGHT: 149.2 LBS | OXYGEN SATURATION: 100 % | TEMPERATURE: 98.1 F | HEIGHT: 60 IN | DIASTOLIC BLOOD PRESSURE: 82 MMHG | BODY MASS INDEX: 29.29 KG/M2 | RESPIRATION RATE: 16 BRPM | SYSTOLIC BLOOD PRESSURE: 124 MMHG

## 2025-04-02 DIAGNOSIS — M54.2 CERVICALGIA: Primary | ICD-10-CM

## 2025-04-02 DIAGNOSIS — M62.838 MUSCLE SPASMS OF NECK: ICD-10-CM

## 2025-04-02 PROCEDURE — 1160F RVW MEDS BY RX/DR IN RCRD: CPT | Performed by: NURSE PRACTITIONER

## 2025-04-02 PROCEDURE — G8427 DOCREV CUR MEDS BY ELIG CLIN: HCPCS | Performed by: NURSE PRACTITIONER

## 2025-04-02 PROCEDURE — 1090F PRES/ABSN URINE INCON ASSESS: CPT | Performed by: NURSE PRACTITIONER

## 2025-04-02 PROCEDURE — 3017F COLORECTAL CA SCREEN DOC REV: CPT | Performed by: NURSE PRACTITIONER

## 2025-04-02 PROCEDURE — 1159F MED LIST DOCD IN RCRD: CPT | Performed by: NURSE PRACTITIONER

## 2025-04-02 PROCEDURE — 1123F ACP DISCUSS/DSCN MKR DOCD: CPT | Performed by: NURSE PRACTITIONER

## 2025-04-02 PROCEDURE — 1036F TOBACCO NON-USER: CPT | Performed by: NURSE PRACTITIONER

## 2025-04-02 PROCEDURE — 1125F AMNT PAIN NOTED PAIN PRSNT: CPT | Performed by: NURSE PRACTITIONER

## 2025-04-02 PROCEDURE — 99213 OFFICE O/P EST LOW 20 MIN: CPT | Performed by: NURSE PRACTITIONER

## 2025-04-02 PROCEDURE — G8419 CALC BMI OUT NRM PARAM NOF/U: HCPCS | Performed by: NURSE PRACTITIONER

## 2025-04-02 PROCEDURE — G8399 PT W/DXA RESULTS DOCUMENT: HCPCS | Performed by: NURSE PRACTITIONER

## 2025-04-02 RX ORDER — LATANOPROST 50 UG/ML
1 SOLUTION/ DROPS OPHTHALMIC DAILY
COMMUNITY
Start: 2025-03-25

## 2025-04-02 RX ORDER — BACLOFEN 10 MG/1
5-10 TABLET ORAL 3 TIMES DAILY PRN
Qty: 30 TABLET | Refills: 1 | Status: SHIPPED | OUTPATIENT
Start: 2025-04-02

## 2025-04-02 SDOH — ECONOMIC STABILITY: FOOD INSECURITY: WITHIN THE PAST 12 MONTHS, THE FOOD YOU BOUGHT JUST DIDN'T LAST AND YOU DIDN'T HAVE MONEY TO GET MORE.: NEVER TRUE

## 2025-04-02 SDOH — ECONOMIC STABILITY: FOOD INSECURITY: WITHIN THE PAST 12 MONTHS, YOU WORRIED THAT YOUR FOOD WOULD RUN OUT BEFORE YOU GOT MONEY TO BUY MORE.: NEVER TRUE

## 2025-04-02 ASSESSMENT — PATIENT HEALTH QUESTIONNAIRE - PHQ9
2. FEELING DOWN, DEPRESSED OR HOPELESS: NOT AT ALL
SUM OF ALL RESPONSES TO PHQ QUESTIONS 1-9: 0
1. LITTLE INTEREST OR PLEASURE IN DOING THINGS: NOT AT ALL

## 2025-04-02 NOTE — PROGRESS NOTES
Chief Complaint   Patient presents with    Neck Pain       SUBJECTIVE:    Geovanna Perdomo is a 69 y.o. female who is here today with complaints of ongoing neck pain.    The patient states she started having gradually worsening neck pain approximately 2 weeks ago.  She cannot recall any activity or incident that may have caused her symptoms, but states that it became quite uncomfortable.  She had started using a heating pad as well as some \"neck muscle rub,\" which seem to help her symptoms somewhat.  She denies any rash, bruising, redness, or swelling.  She states the pain is located central to her cervical spine and seems to worsen with turning her head left or right or with extension of her neck.  She states the pain is not as uncomfortable with flexion of her neck.  She states her pain has gradually improved, but remains tender and rates it as a 5/10 today.    Current Outpatient Medications   Medication Sig Dispense Refill    latanoprost (XALATAN) 0.005 % ophthalmic solution Place 1 drop into both eyes daily      baclofen (LIORESAL) 10 MG tablet Take 0.5-1 tablets by mouth 3 times daily as needed (neck pain/spasm) 30 tablet 1    Semaglutide,0.25 or 0.5MG/DOS, (OZEMPIC, 0.25 OR 0.5 MG/DOSE,) 2 MG/3ML SOPN INJECT SUBCUTANEOUSLY 0.5 MG  EVERY WEEK 6 mL 1    glipiZIDE (GLUCOTROL) 10 MG tablet TAKE 1 TABLET BY MOUTH TWICE  DAILY BEFORE BREAKFAST AND  DINNER 200 tablet 1    levothyroxine (SYNTHROID) 75 MCG tablet TAKE 1 TABLET BY MOUTH DAILY 100 tablet 1    atorvastatin (LIPITOR) 80 MG tablet TAKE 1 TABLET BY MOUTH ONCE  DAILY 100 tablet 1    Insulin Degludec 100 UNIT/ML SOPN Take 40 units every morning 45 mL 1    Continuous Glucose Sensor (DEXCOM G7 SENSOR) MISC 1 each by Does not apply route every 10 days      Continuous Glucose  (DEXCOM G7 ) JEFFERY 1 Units by Does not apply route 4 times daily (before meals and nightly)      NONFORMULARY 2 capsules daily DR RYAN BLOOD SUGAR 24 HOURS      Insulin

## 2025-04-02 NOTE — PROGRESS NOTES
Geovanna Perdomo is a 69 y.o. female     Chief Complaint   Patient presents with    Neck Pain       /82   Pulse 100   Temp 98.1 °F (36.7 °C) (Oral)   Resp 16   Ht 1.524 m (5')   Wt 67.7 kg (149 lb 3.2 oz)   SpO2 100%   BMI 29.14 kg/m²     Health Maintenance Due   Topic Date Due    DTaP/Tdap/Td vaccine (1 - Tdap) Never done    Pneumococcal 50+ years Vaccine (1 of 2 - PCV) Never done    Shingles vaccine (1 of 2) Never done    Respiratory Syncytial Virus (RSV) Pregnant or age 60 yrs+ (1 - Risk 60-74 years 1-dose series) Never done    COVID-19 Vaccine (1 - 2024-25 season) Never done    Annual Wellness Visit (Medicare Advantage)  01/01/2025         \"Have you been to the ER, urgent care clinic since your last visit?  Hospitalized since your last visit?\"    NO    “Have you seen or consulted any other health care providers outside of [facility] since your last visit?”    NO

## 2025-04-10 ENCOUNTER — HOSPITAL ENCOUNTER (OUTPATIENT)
Facility: HOSPITAL | Age: 70
Discharge: HOME | End: 2025-04-13
Payer: MEDICARE

## 2025-04-10 DIAGNOSIS — M54.2 CERVICALGIA: ICD-10-CM

## 2025-04-10 PROCEDURE — 72040 X-RAY EXAM NECK SPINE 2-3 VW: CPT

## 2025-04-16 ENCOUNTER — RESULTS FOLLOW-UP (OUTPATIENT)
Facility: CLINIC | Age: 70
End: 2025-04-16

## 2025-05-30 ENCOUNTER — OFFICE VISIT (OUTPATIENT)
Age: 70
End: 2025-05-30

## 2025-05-30 VITALS
HEIGHT: 60 IN | BODY MASS INDEX: 28 KG/M2 | SYSTOLIC BLOOD PRESSURE: 120 MMHG | DIASTOLIC BLOOD PRESSURE: 72 MMHG | WEIGHT: 142.6 LBS | HEART RATE: 87 BPM

## 2025-05-30 DIAGNOSIS — E78.2 MIXED HYPERLIPIDEMIA: ICD-10-CM

## 2025-05-30 DIAGNOSIS — E03.9 ACQUIRED HYPOTHYROIDISM: ICD-10-CM

## 2025-05-30 DIAGNOSIS — E11.29 TYPE 2 DIABETES MELLITUS WITH OTHER DIABETIC KIDNEY COMPLICATION (HCC): Primary | ICD-10-CM

## 2025-05-30 LAB — HBA1C MFR BLD: 7.9 %

## 2025-05-30 RX ORDER — SEMAGLUTIDE 0.68 MG/ML
INJECTION, SOLUTION SUBCUTANEOUS
Qty: 6 ML | Refills: 1 | Status: SHIPPED | OUTPATIENT
Start: 2025-05-30

## 2025-05-30 NOTE — PROGRESS NOTES
Chief Complaint   Patient presents with    Diabetes    Thyroid Problem     Pcp and pharmacy verified     History of Present Illness: Geovanna Perdomo is a 69 y.o. female here for follow up of diabetes.  She was diagnosed with diabetes at the age of 29 \"with my pregnancy, but it went away and came back in 2007\".    At our last visit in January 2025 her A1C was up to 13.6%. Looking at her CGM data he BG were in the 350+ consistently and pt was missing doeses of her Tresiba 2-3 times per week.  I will increased her Tresiba to 40 units daily and started pt on Ozepmic 0.5mg weekly.  We discussed the mechanism of action of the GLP-1 agents and the risk vs benefits, including Nausea, pancreatitis and hypoglycemia.  Pt given written information about ozempic.  Pt to continue the Glipizide 10mg BEFORE breakfast and BEFORE dinner.  I emphasized the importance of her taking her Tresiba units daily.    \"My neck started hurting me about a month ago. I got an x-ray and they said I have arthritis.\"    Pt is taking Tresiba 40 units every morning, Glipizide 10mg in the morning and 10mg with dinner and Ozempic 0.5mg every Tuesday.    She is using the DexCom CGM to monitor her BG.  I reviewed her last 30 days of CGM readings           Her A1C today was down from 13.6% to 7.9%.    - Pt wakes around 9AM  - She has breakfast around 1030-11AM, yesterday she had oatmeal, an apple and water.    - She has lunch around 2PM, yesterday she had eggs, gonzalez, grits and sweet tea.  - She has dinner around 7PM, last night she had baked chicken, cabbage, yams and lemonade.     - She will have a snack of fruit in the evening.    Exercise consists of \"I am not doing the exercises with my daughters as often.\"     No history of vascular disease. No known family hx of CVA or CAD.    She has occasional numbness in her feet and  she does have microalbuminuria.      Last eye exam was March 2025 \"he said he was keeping a check on the pressure in my eyes and gave

## 2025-06-02 RX ORDER — SEMAGLUTIDE 0.68 MG/ML
INJECTION, SOLUTION SUBCUTANEOUS
Qty: 9 ML | Refills: 1 | Status: ACTIVE | OUTPATIENT
Start: 2025-06-02

## 2025-06-06 ENCOUNTER — OFFICE VISIT (OUTPATIENT)
Facility: CLINIC | Age: 70
End: 2025-06-06
Payer: MEDICARE

## 2025-06-06 VITALS
OXYGEN SATURATION: 98 % | HEART RATE: 92 BPM | DIASTOLIC BLOOD PRESSURE: 76 MMHG | SYSTOLIC BLOOD PRESSURE: 128 MMHG | TEMPERATURE: 98 F | BODY MASS INDEX: 28.4 KG/M2 | WEIGHT: 145.4 LBS

## 2025-06-06 DIAGNOSIS — M62.838 MUSCLE SPASMS OF NECK: ICD-10-CM

## 2025-06-06 DIAGNOSIS — M54.2 CERVICALGIA: ICD-10-CM

## 2025-06-06 DIAGNOSIS — E78.2 DM TYPE 2 WITH DIABETIC MIXED HYPERLIPIDEMIA (HCC): ICD-10-CM

## 2025-06-06 DIAGNOSIS — E11.65 TYPE 2 DIABETES MELLITUS WITH HYPERGLYCEMIA, WITH LONG-TERM CURRENT USE OF INSULIN (HCC): ICD-10-CM

## 2025-06-06 DIAGNOSIS — E11.69 DM TYPE 2 WITH DIABETIC MIXED HYPERLIPIDEMIA (HCC): ICD-10-CM

## 2025-06-06 DIAGNOSIS — Z79.899 ON STATIN THERAPY: ICD-10-CM

## 2025-06-06 DIAGNOSIS — Z00.00 MEDICARE ANNUAL WELLNESS VISIT, SUBSEQUENT: Primary | ICD-10-CM

## 2025-06-06 DIAGNOSIS — Z79.4 TYPE 2 DIABETES MELLITUS WITH HYPERGLYCEMIA, WITH LONG-TERM CURRENT USE OF INSULIN (HCC): ICD-10-CM

## 2025-06-06 DIAGNOSIS — E03.9 ACQUIRED HYPOTHYROIDISM: ICD-10-CM

## 2025-06-06 DIAGNOSIS — E66.3 OVERWEIGHT (BMI 25.0-29.9): ICD-10-CM

## 2025-06-06 LAB
ALBUMIN SERPL-MCNC: 3.4 G/DL (ref 3.5–5)
ALBUMIN/GLOB SERPL: 1 (ref 1.1–2.2)
ALP SERPL-CCNC: 152 U/L (ref 45–117)
ALT SERPL-CCNC: 22 U/L (ref 12–78)
ANION GAP SERPL CALC-SCNC: 8 MMOL/L (ref 2–12)
AST SERPL-CCNC: 18 U/L (ref 15–37)
BILIRUB SERPL-MCNC: 0.5 MG/DL (ref 0.2–1)
BUN SERPL-MCNC: 9 MG/DL (ref 6–20)
BUN/CREAT SERPL: 15 (ref 12–20)
CALCIUM SERPL-MCNC: 9.2 MG/DL (ref 8.5–10.1)
CHLORIDE SERPL-SCNC: 105 MMOL/L (ref 97–108)
CHOLEST SERPL-MCNC: 195 MG/DL
CO2 SERPL-SCNC: 29 MMOL/L (ref 21–32)
CREAT SERPL-MCNC: 0.61 MG/DL (ref 0.55–1.02)
EST. AVERAGE GLUCOSE BLD GHB EST-MCNC: 192 MG/DL
GLOBULIN SER CALC-MCNC: 3.4 G/DL (ref 2–4)
GLUCOSE SERPL-MCNC: 147 MG/DL (ref 65–100)
HBA1C MFR BLD: 8.3 % (ref 4–5.6)
HDLC SERPL-MCNC: 47 MG/DL
HDLC SERPL: 4.1 (ref 0–5)
LDLC SERPL CALC-MCNC: 125.2 MG/DL (ref 0–100)
POTASSIUM SERPL-SCNC: 3.7 MMOL/L (ref 3.5–5.1)
PROT SERPL-MCNC: 6.8 G/DL (ref 6.4–8.2)
SODIUM SERPL-SCNC: 142 MMOL/L (ref 136–145)
T4 FREE SERPL-MCNC: 1.1 NG/DL (ref 0.8–1.5)
TRIGL SERPL-MCNC: 114 MG/DL
TSH SERPL DL<=0.05 MIU/L-ACNC: 3.33 UIU/ML (ref 0.36–3.74)
VLDLC SERPL CALC-MCNC: 22.8 MG/DL

## 2025-06-06 PROCEDURE — 3051F HG A1C>EQUAL 7.0%<8.0%: CPT | Performed by: NURSE PRACTITIONER

## 2025-06-06 PROCEDURE — 1123F ACP DISCUSS/DSCN MKR DOCD: CPT | Performed by: NURSE PRACTITIONER

## 2025-06-06 PROCEDURE — 3046F HEMOGLOBIN A1C LEVEL >9.0%: CPT | Performed by: NURSE PRACTITIONER

## 2025-06-06 PROCEDURE — 3017F COLORECTAL CA SCREEN DOC REV: CPT | Performed by: NURSE PRACTITIONER

## 2025-06-06 PROCEDURE — 1160F RVW MEDS BY RX/DR IN RCRD: CPT | Performed by: NURSE PRACTITIONER

## 2025-06-06 PROCEDURE — G0439 PPPS, SUBSEQ VISIT: HCPCS | Performed by: NURSE PRACTITIONER

## 2025-06-06 PROCEDURE — 1126F AMNT PAIN NOTED NONE PRSNT: CPT | Performed by: NURSE PRACTITIONER

## 2025-06-06 PROCEDURE — 1159F MED LIST DOCD IN RCRD: CPT | Performed by: NURSE PRACTITIONER

## 2025-06-06 RX ORDER — BACLOFEN 10 MG/1
5-10 TABLET ORAL 3 TIMES DAILY PRN
Qty: 30 TABLET | Refills: 5 | Status: SHIPPED | OUTPATIENT
Start: 2025-06-06

## 2025-06-06 SDOH — HEALTH STABILITY: PHYSICAL HEALTH: ON AVERAGE, HOW MANY MINUTES DO YOU ENGAGE IN EXERCISE AT THIS LEVEL?: 10 MIN

## 2025-06-06 SDOH — HEALTH STABILITY: PHYSICAL HEALTH: ON AVERAGE, HOW MANY DAYS PER WEEK DO YOU ENGAGE IN MODERATE TO STRENUOUS EXERCISE (LIKE A BRISK WALK)?: 2 DAYS

## 2025-06-06 ASSESSMENT — LIFESTYLE VARIABLES
HOW MANY STANDARD DRINKS CONTAINING ALCOHOL DO YOU HAVE ON A TYPICAL DAY: 0
HOW OFTEN DO YOU HAVE A DRINK CONTAINING ALCOHOL: NEVER
HOW OFTEN DO YOU HAVE A DRINK CONTAINING ALCOHOL: 1
HOW MANY STANDARD DRINKS CONTAINING ALCOHOL DO YOU HAVE ON A TYPICAL DAY: PATIENT DOES NOT DRINK
HOW OFTEN DO YOU HAVE SIX OR MORE DRINKS ON ONE OCCASION: 1

## 2025-06-06 ASSESSMENT — PATIENT HEALTH QUESTIONNAIRE - PHQ9
SUM OF ALL RESPONSES TO PHQ QUESTIONS 1-9: 0
2. FEELING DOWN, DEPRESSED OR HOPELESS: NOT AT ALL
1. LITTLE INTEREST OR PLEASURE IN DOING THINGS: NOT AT ALL

## 2025-06-06 NOTE — PROGRESS NOTES
Medicare Annual Wellness Visit    Geovanna Perdomo is here for Medicare AWV (Pt comes in today for a medicare wellness visit.)    Assessment & Plan   Medicare annual wellness visit, subsequent  Type 2 diabetes mellitus with hyperglycemia, with long-term current use of insulin (HCC)  DM type 2 with diabetic mixed hyperlipidemia (HCC)  Cervicalgia  Muscle spasms of neck  -     baclofen (LIORESAL) 10 MG tablet; Take 0.5-1 tablets by mouth 3 times daily as needed (neck pain/spasm), Disp-30 tablet, R-5Normal  Acquired hypothyroidism  Overweight (BMI 25.0-29.9)  On statin therapy     No follow-ups on file.     Subjective       Patient's complete Health Risk Assessment and screening values have been reviewed and are found in Flowsheets. The following problems were reviewed today and where indicated follow up appointments were made and/or referrals ordered.    Positive Risk Factor Screenings with Interventions:     Cognitive:   Clock Drawing Test (CDT): (!) Abnormal  Words recalled: 2 Words Recalled  Total Score: (!) 2  Total Score Interpretation: Abnormal Mini-Cog  Interventions:  Patient declines any further evaluation or treatment            Inactivity:  On average, how many days per week do you engage in moderate to strenuous exercise (like a brisk walk)?: (Patient-Rptd) 2 days (!) Abnormal  On average, how many minutes do you engage in exercise at this level?: (Patient-Rptd) 10 min  Interventions:  Patient declined any further interventions or treatment           Advanced Directives:  Do you have a Living Will?: (!) (Patient-Rptd) No    Intervention:  has NO advanced directive - not interested in additional information                     Objective   Vitals:    06/06/25 1000   BP: 128/76   Pulse: 92   Temp: 98 °F (36.7 °C)   TempSrc: Oral   SpO2: 98%   Weight: 66 kg (145 lb 6.4 oz)      Body mass index is 28.4 kg/m².        General Appearance: alert and oriented to person, place and time, well-developed and well-nourished,

## 2025-06-09 ENCOUNTER — RESULTS FOLLOW-UP (OUTPATIENT)
Facility: CLINIC | Age: 70
End: 2025-06-09

## 2025-07-04 DIAGNOSIS — E11.69 MIXED HYPERLIPIDEMIA DUE TO TYPE 2 DIABETES MELLITUS (HCC): ICD-10-CM

## 2025-07-04 DIAGNOSIS — E78.2 MIXED HYPERLIPIDEMIA DUE TO TYPE 2 DIABETES MELLITUS (HCC): ICD-10-CM

## 2025-07-07 RX ORDER — ATORVASTATIN CALCIUM 80 MG/1
80 TABLET, FILM COATED ORAL DAILY
Qty: 100 TABLET | Refills: 1 | Status: SHIPPED | OUTPATIENT
Start: 2025-07-07

## 2025-07-07 NOTE — TELEPHONE ENCOUNTER
PCP: Daniel Ramirez APRN - NP    Last appt: 6/6/2025    Future Appointments   Date Time Provider Department Center   10/1/2025 11:30 AM Ervin Deleon MD RDE [facility]   12/9/2025 10:00 AM [facility]       Requested Prescriptions     Pending Prescriptions Disp Refills    atorvastatin (LIPITOR) 80 MG tablet [Pharmacy Med Name: Atorvastatin Calcium 80 MG Oral Tablet] 100 tablet 1     Sig: TAKE 1 TABLET BY MOUTH ONCE  DAILY

## 2025-07-25 DIAGNOSIS — E03.9 ACQUIRED HYPOTHYROIDISM: ICD-10-CM

## 2025-07-28 RX ORDER — LEVOTHYROXINE SODIUM 75 UG/1
75 TABLET ORAL DAILY
Qty: 100 TABLET | Refills: 1 | Status: SHIPPED | OUTPATIENT
Start: 2025-07-28

## 2025-07-28 NOTE — TELEPHONE ENCOUNTER
PCP: Daniel Ramirez APRN - NP    Last appt: 6/6/2025    Future Appointments   Date Time Provider Department Center   10/1/2025 11:30 AM Ervin Deleon MD RDE [facility]   12/9/2025 10:00 AM Daniel Ramirez APRN - NP [facility]       Requested Prescriptions     Pending Prescriptions Disp Refills    levothyroxine (SYNTHROID) 75 MCG tablet [Pharmacy Med Name: ALVOGEN-LEVOTHYROXINE TAB 75MCG] 100 tablet 1     Sig: TAKE 1 TABLET BY MOUTH DAILY

## (undated) DEVICE — 3M™ TEGADERM™ TRANSPARENT FILM DRESSING FRAME STYLE, 1624W, 2-3/8 IN X 2-3/4 IN (6 CM X 7 CM), 100/CT 4CT/CASE: Brand: 3M™ TEGADERM™

## (undated) DEVICE — CONTINU-FLO SOLUTION SET, 2 INJECTION SITES, MALE LUER LOCK ADAPTER WITH RETRACTABLE COLLAR, LARGE BORE STOPCOCK WITH ROTATING MALE LUER LOCK EXTENSION SET, 2 INJECTION SITES, MALE LUER LOCK ADAPTER WITH RETRACTABLE COLLAR: Brand: INTERLINK/CONTINU-FLO

## (undated) DEVICE — SOLUTION LACTATED RINGERS INJECTION USP

## (undated) DEVICE — BASIN EMSIS 16OZ GRAPHITE PLAS KID SHP MOLD GRAD FOR ORAL

## (undated) DEVICE — ENDO CARRY-ON PROCEDURE KIT INCLUDES ENZYMATIC SPONGE, GAUZE, BIOHAZARD LABEL, TRAY, LUBRICANT, DIRTY SCOPE LABEL, WATER LABEL, TRAY, DRAWSTRING PAD, AND DEFENDO 4-PIECE KIT.: Brand: ENDO CARRY-ON PROCEDURE KIT

## (undated) DEVICE — KENDALL DL ECG CABLE AND LEAD WIRE SYSTEM, 3-LEAD, SINGLE PATIENT USE: Brand: KENDALL

## (undated) DEVICE — KIT,1200CC CANISTER,3/16"X6' TUBING: Brand: MEDLINE INDUSTRIES, INC.

## (undated) DEVICE — FORCEPS BX L L240CM DIA2.4MM RAD JAW 4 HOT FOR POLYP DISP